# Patient Record
Sex: MALE | Race: BLACK OR AFRICAN AMERICAN | Employment: OTHER | URBAN - NONMETROPOLITAN AREA
[De-identification: names, ages, dates, MRNs, and addresses within clinical notes are randomized per-mention and may not be internally consistent; named-entity substitution may affect disease eponyms.]

---

## 2016-10-25 LAB
A/G RATIO: NORMAL
ALBUMIN SERPL-MCNC: NORMAL G/DL
ALP BLD-CCNC: NORMAL U/L
ALT SERPL-CCNC: 44 U/L
AST SERPL-CCNC: 28 U/L
BILIRUB SERPL-MCNC: 0.3 MG/DL (ref 0.1–1.4)
BILIRUBIN DIRECT: NORMAL MG/DL
BILIRUBIN, INDIRECT: NORMAL
GLOBULIN: NORMAL
PROSTATE SPECIFIC ANTIGEN: 0.56 NG/ML
PROTEIN TOTAL: NORMAL G/DL

## 2017-04-17 LAB
BASOPHILS ABSOLUTE: NORMAL /ΜL
BASOPHILS RELATIVE PERCENT: NORMAL %
EOSINOPHILS ABSOLUTE: NORMAL /ΜL
EOSINOPHILS RELATIVE PERCENT: NORMAL %
HCT VFR BLD CALC: 42.4 % (ref 41–53)
HEMOGLOBIN: 14.2 G/DL (ref 13.5–17.5)
LYMPHOCYTES ABSOLUTE: NORMAL /ΜL
LYMPHOCYTES RELATIVE PERCENT: NORMAL %
MCH RBC QN AUTO: NORMAL PG
MCHC RBC AUTO-ENTMCNC: NORMAL G/DL
MCV RBC AUTO: NORMAL FL
MONOCYTES ABSOLUTE: NORMAL /ΜL
MONOCYTES RELATIVE PERCENT: NORMAL %
NEUTROPHILS ABSOLUTE: NORMAL /ΜL
NEUTROPHILS RELATIVE PERCENT: NORMAL %
PDW BLD-RTO: NORMAL %
PLATELET # BLD: 244 K/ΜL
PMV BLD AUTO: NORMAL FL
RBC # BLD: NORMAL 10^6/ΜL
WBC # BLD: 6.65 10^3/ML

## 2017-04-27 ENCOUNTER — INITIAL CONSULT (OUTPATIENT)
Dept: PULMONOLOGY | Age: 57
End: 2017-04-27

## 2017-04-27 VITALS
HEART RATE: 86 BPM | OXYGEN SATURATION: 97 % | DIASTOLIC BLOOD PRESSURE: 70 MMHG | BODY MASS INDEX: 29 KG/M2 | SYSTOLIC BLOOD PRESSURE: 130 MMHG | WEIGHT: 218.8 LBS | HEIGHT: 73 IN

## 2017-04-27 DIAGNOSIS — R53.83 FATIGUE, UNSPECIFIED TYPE: ICD-10-CM

## 2017-04-27 DIAGNOSIS — G47.19 EXCESSIVE DAYTIME SLEEPINESS: Primary | ICD-10-CM

## 2017-04-27 DIAGNOSIS — R06.81 WITNESSED APNEIC SPELLS: ICD-10-CM

## 2017-04-27 DIAGNOSIS — R06.83 SNORING: ICD-10-CM

## 2017-04-27 DIAGNOSIS — G47.8 NON-RESTORATIVE SLEEP: ICD-10-CM

## 2017-04-27 PROCEDURE — 99204 OFFICE O/P NEW MOD 45 MIN: CPT | Performed by: INTERNAL MEDICINE

## 2017-05-25 ENCOUNTER — OFFICE VISIT (OUTPATIENT)
Dept: PULMONOLOGY | Age: 57
End: 2017-05-25

## 2017-05-25 VITALS
HEART RATE: 91 BPM | HEIGHT: 73 IN | OXYGEN SATURATION: 97 % | SYSTOLIC BLOOD PRESSURE: 120 MMHG | WEIGHT: 218.8 LBS | BODY MASS INDEX: 29 KG/M2 | DIASTOLIC BLOOD PRESSURE: 76 MMHG

## 2017-05-25 DIAGNOSIS — R06.83 SNORING: Primary | ICD-10-CM

## 2017-05-25 PROCEDURE — 99212 OFFICE O/P EST SF 10 MIN: CPT | Performed by: PHYSICIAN ASSISTANT

## 2017-07-21 ENCOUNTER — APPOINTMENT (OUTPATIENT)
Dept: GENERAL RADIOLOGY | Age: 57
End: 2017-07-21
Payer: OTHER GOVERNMENT

## 2017-07-21 ENCOUNTER — HOSPITAL ENCOUNTER (EMERGENCY)
Age: 57
Discharge: HOME | End: 2017-07-21
Attending: EMERGENCY MEDICINE
Payer: OTHER GOVERNMENT

## 2017-07-21 VITALS
HEART RATE: 91 BPM | DIASTOLIC BLOOD PRESSURE: 97 MMHG | BODY MASS INDEX: 27.59 KG/M2 | HEIGHT: 74 IN | RESPIRATION RATE: 15 BRPM | TEMPERATURE: 97.8 F | SYSTOLIC BLOOD PRESSURE: 133 MMHG | WEIGHT: 215 LBS | OXYGEN SATURATION: 99 %

## 2017-07-21 DIAGNOSIS — M25.552 PAIN OF LEFT HIP: Primary | ICD-10-CM

## 2017-07-21 DIAGNOSIS — S76.911A MUSCLE STRAIN OF THIGH, RIGHT, INITIAL ENCOUNTER: ICD-10-CM

## 2017-07-21 PROCEDURE — 99283 EMERGENCY DEPT VISIT LOW MDM: CPT

## 2017-07-21 PROCEDURE — 96372 THER/PROPH/DIAG INJ SC/IM: CPT

## 2017-07-21 PROCEDURE — 73552 X-RAY EXAM OF FEMUR 2/>: CPT

## 2017-07-21 PROCEDURE — 6360000002 HC RX W HCPCS: Performed by: EMERGENCY MEDICINE

## 2017-07-21 RX ORDER — TRAMADOL HYDROCHLORIDE 50 MG/1
50 TABLET ORAL EVERY 6 HOURS PRN
Qty: 9 TABLET | Refills: 0 | Status: SHIPPED | OUTPATIENT
Start: 2017-07-21 | End: 2017-10-04

## 2017-07-21 RX ADMIN — KETOROLAC TROMETHAMINE 60 MG: 30 INJECTION, SOLUTION INTRAMUSCULAR at 07:33

## 2017-07-21 ASSESSMENT — PAIN DESCRIPTION - LOCATION
LOCATION: HIP
LOCATION: HIP

## 2017-07-21 ASSESSMENT — PAIN SCALES - GENERAL
PAINLEVEL_OUTOF10: 9
PAINLEVEL_OUTOF10: 6

## 2017-07-21 ASSESSMENT — PAIN DESCRIPTION - FREQUENCY
FREQUENCY: CONTINUOUS
FREQUENCY: CONTINUOUS

## 2017-07-21 ASSESSMENT — ENCOUNTER SYMPTOMS
COLOR CHANGE: 0
PHOTOPHOBIA: 0
COUGH: 0
DIARRHEA: 0
STRIDOR: 0
ABDOMINAL DISTENTION: 0
EYE REDNESS: 0
SHORTNESS OF BREATH: 0
EYE DISCHARGE: 0
RHINORRHEA: 0
VOMITING: 0
FACIAL SWELLING: 0
NAUSEA: 0

## 2017-07-21 ASSESSMENT — PAIN DESCRIPTION - ORIENTATION
ORIENTATION: LEFT
ORIENTATION: LEFT

## 2017-07-21 ASSESSMENT — PAIN DESCRIPTION - DESCRIPTORS
DESCRIPTORS: STABBING
DESCRIPTORS: SHARP

## 2017-07-21 ASSESSMENT — PAIN DESCRIPTION - PROGRESSION: CLINICAL_PROGRESSION: GRADUALLY IMPROVING

## 2017-07-21 ASSESSMENT — PAIN DESCRIPTION - PAIN TYPE
TYPE: ACUTE PAIN
TYPE: ACUTE PAIN

## 2017-08-22 ENCOUNTER — HOSPITAL ENCOUNTER (EMERGENCY)
Age: 57
Discharge: HOME | End: 2017-08-22
Payer: OTHER GOVERNMENT

## 2017-08-22 VITALS
OXYGEN SATURATION: 98 % | RESPIRATION RATE: 16 BRPM | DIASTOLIC BLOOD PRESSURE: 97 MMHG | SYSTOLIC BLOOD PRESSURE: 146 MMHG | HEART RATE: 91 BPM | TEMPERATURE: 98.4 F

## 2017-08-22 DIAGNOSIS — S39.012A BACK STRAIN, INITIAL ENCOUNTER: Primary | ICD-10-CM

## 2017-08-22 PROCEDURE — 6360000002 HC RX W HCPCS: Performed by: PHYSICIAN ASSISTANT

## 2017-08-22 PROCEDURE — 99282 EMERGENCY DEPT VISIT SF MDM: CPT

## 2017-08-22 PROCEDURE — 96372 THER/PROPH/DIAG INJ SC/IM: CPT

## 2017-08-22 RX ORDER — NAPROXEN 500 MG/1
500 TABLET ORAL 2 TIMES DAILY
Qty: 60 TABLET | Refills: 0 | Status: SHIPPED | OUTPATIENT
Start: 2017-08-22 | End: 2017-10-24

## 2017-08-22 RX ADMIN — KETOROLAC TROMETHAMINE 60 MG: 30 INJECTION, SOLUTION INTRAMUSCULAR at 20:35

## 2017-08-22 ASSESSMENT — ENCOUNTER SYMPTOMS
CHEST TIGHTNESS: 0
BACK PAIN: 1
CONSTIPATION: 0
VOMITING: 0
SHORTNESS OF BREATH: 0
NAUSEA: 0
ABDOMINAL PAIN: 0
DIARRHEA: 0

## 2017-08-22 ASSESSMENT — PAIN SCALES - GENERAL
PAINLEVEL_OUTOF10: 10
PAINLEVEL_OUTOF10: 10

## 2017-08-22 ASSESSMENT — PAIN DESCRIPTION - LOCATION: LOCATION: BACK

## 2017-08-22 ASSESSMENT — PAIN DESCRIPTION - PAIN TYPE: TYPE: CHRONIC PAIN

## 2017-09-06 ENCOUNTER — HOSPITAL ENCOUNTER (EMERGENCY)
Age: 57
Discharge: HOME | End: 2017-09-06
Payer: OTHER GOVERNMENT

## 2017-09-06 VITALS
RESPIRATION RATE: 18 BRPM | BODY MASS INDEX: 26.95 KG/M2 | SYSTOLIC BLOOD PRESSURE: 144 MMHG | TEMPERATURE: 97.8 F | DIASTOLIC BLOOD PRESSURE: 87 MMHG | OXYGEN SATURATION: 97 % | WEIGHT: 210 LBS | HEIGHT: 74 IN | HEART RATE: 86 BPM

## 2017-09-06 DIAGNOSIS — S39.012A LUMBAR STRAIN, INITIAL ENCOUNTER: Primary | ICD-10-CM

## 2017-09-06 PROCEDURE — 6360000002 HC RX W HCPCS: Performed by: NURSE PRACTITIONER

## 2017-09-06 PROCEDURE — 96372 THER/PROPH/DIAG INJ SC/IM: CPT

## 2017-09-06 PROCEDURE — 99282 EMERGENCY DEPT VISIT SF MDM: CPT

## 2017-09-06 RX ORDER — METHYLPREDNISOLONE 4 MG/1
TABLET ORAL
Qty: 1 KIT | Refills: 0 | Status: SHIPPED | OUTPATIENT
Start: 2017-09-06 | End: 2017-09-12

## 2017-09-06 RX ORDER — ORPHENADRINE CITRATE 30 MG/ML
60 INJECTION INTRAMUSCULAR; INTRAVENOUS ONCE
Status: COMPLETED | OUTPATIENT
Start: 2017-09-06 | End: 2017-09-06

## 2017-09-06 RX ORDER — KETOROLAC TROMETHAMINE 30 MG/ML
30 INJECTION, SOLUTION INTRAMUSCULAR; INTRAVENOUS ONCE
Status: COMPLETED | OUTPATIENT
Start: 2017-09-06 | End: 2017-09-06

## 2017-09-06 RX ADMIN — ORPHENADRINE CITRATE 60 MG: 30 INJECTION INTRAMUSCULAR; INTRAVENOUS at 11:00

## 2017-09-06 RX ADMIN — KETOROLAC TROMETHAMINE 30 MG: 30 INJECTION, SOLUTION INTRAMUSCULAR at 10:59

## 2017-09-06 ASSESSMENT — ENCOUNTER SYMPTOMS
SORE THROAT: 0
SHORTNESS OF BREATH: 0
VOICE CHANGE: 0
ABDOMINAL DISTENTION: 0
CONSTIPATION: 0
COLOR CHANGE: 0
DIARRHEA: 0
EYE REDNESS: 0
VOMITING: 0
SINUS PRESSURE: 0
BLOOD IN STOOL: 0
BACK PAIN: 1
COUGH: 0
RHINORRHEA: 0
CHEST TIGHTNESS: 0
NAUSEA: 0
PHOTOPHOBIA: 0
ABDOMINAL PAIN: 0
WHEEZING: 0

## 2017-09-06 ASSESSMENT — PAIN DESCRIPTION - FREQUENCY: FREQUENCY: CONTINUOUS

## 2017-09-06 ASSESSMENT — PAIN DESCRIPTION - PAIN TYPE: TYPE: CHRONIC PAIN

## 2017-09-06 ASSESSMENT — PAIN SCALES - GENERAL
PAINLEVEL_OUTOF10: 10
PAINLEVEL_OUTOF10: 10

## 2017-09-06 ASSESSMENT — PAIN DESCRIPTION - ORIENTATION: ORIENTATION: LEFT;LOWER

## 2017-09-06 ASSESSMENT — PAIN DESCRIPTION - LOCATION: LOCATION: BACK

## 2017-10-04 ENCOUNTER — HOSPITAL ENCOUNTER (EMERGENCY)
Age: 57
Discharge: HOME | End: 2017-10-04
Attending: EMERGENCY MEDICINE
Payer: OTHER GOVERNMENT

## 2017-10-04 VITALS
HEIGHT: 74 IN | TEMPERATURE: 98.4 F | WEIGHT: 209 LBS | DIASTOLIC BLOOD PRESSURE: 106 MMHG | RESPIRATION RATE: 17 BRPM | SYSTOLIC BLOOD PRESSURE: 138 MMHG | BODY MASS INDEX: 26.82 KG/M2 | HEART RATE: 88 BPM | OXYGEN SATURATION: 99 %

## 2017-10-04 DIAGNOSIS — Z20.2 STD EXPOSURE: ICD-10-CM

## 2017-10-04 DIAGNOSIS — M54.42 LEFT-SIDED LOW BACK PAIN WITH LEFT-SIDED SCIATICA, UNSPECIFIED CHRONICITY: Primary | ICD-10-CM

## 2017-10-04 DIAGNOSIS — N34.2 URETHRITIS: ICD-10-CM

## 2017-10-04 DIAGNOSIS — B80 PINWORMS: ICD-10-CM

## 2017-10-04 LAB
BACTERIA: ABNORMAL /HPF
BILIRUBIN URINE: NEGATIVE
BLOOD, URINE: NEGATIVE
CASTS 2: ABNORMAL /LPF
CASTS UA: ABNORMAL /LPF
CHARACTER, URINE: CLEAR
CHLAMYDIA TRACHOMATIS BY RT-PCR: NOT DETECTED
COLOR: YELLOW
CRYSTALS, UA: ABNORMAL
CT/NG SOURCE: NORMAL
EPITHELIAL CELLS, UA: ABNORMAL /HPF
GLUCOSE URINE: NEGATIVE MG/DL
KETONES, URINE: ABNORMAL
LEUKOCYTE ESTERASE, URINE: NEGATIVE
MISCELLANEOUS 2: ABNORMAL
MUCUS: ABNORMAL
NEISSERIA GONORRHOEAE BY RT-PCR: NOT DETECTED
NITRITE, URINE: NEGATIVE
PH UA: 6
PROTEIN UA: 30
RBC URINE: ABNORMAL /HPF
RENAL EPITHELIAL, UA: ABNORMAL
SPECIFIC GRAVITY, URINE: >= 1.03 (ref 1–1.03)
UROBILINOGEN, URINE: 0.2 EU/DL
WBC UA: ABNORMAL /HPF
YEAST: ABNORMAL

## 2017-10-04 PROCEDURE — 81001 URINALYSIS AUTO W/SCOPE: CPT

## 2017-10-04 PROCEDURE — 99283 EMERGENCY DEPT VISIT LOW MDM: CPT

## 2017-10-04 PROCEDURE — 87591 N.GONORRHOEAE DNA AMP PROB: CPT

## 2017-10-04 PROCEDURE — 96372 THER/PROPH/DIAG INJ SC/IM: CPT

## 2017-10-04 PROCEDURE — 6370000000 HC RX 637 (ALT 250 FOR IP): Performed by: EMERGENCY MEDICINE

## 2017-10-04 PROCEDURE — 6360000002 HC RX W HCPCS: Performed by: EMERGENCY MEDICINE

## 2017-10-04 PROCEDURE — 2500000003 HC RX 250 WO HCPCS: Performed by: EMERGENCY MEDICINE

## 2017-10-04 PROCEDURE — 87491 CHLMYD TRACH DNA AMP PROBE: CPT

## 2017-10-04 RX ORDER — AZITHROMYCIN 250 MG/1
1000 TABLET, FILM COATED ORAL ONCE
Status: COMPLETED | OUTPATIENT
Start: 2017-10-04 | End: 2017-10-04

## 2017-10-04 RX ORDER — GABAPENTIN 300 MG/1
300 CAPSULE ORAL EVERY EVENING
Qty: 15 CAPSULE | Refills: 0 | Status: SHIPPED | OUTPATIENT
Start: 2017-10-04 | End: 2021-03-16 | Stop reason: SDUPTHER

## 2017-10-04 RX ORDER — METHOCARBAMOL 500 MG/1
500 TABLET, FILM COATED ORAL 3 TIMES DAILY
Qty: 15 TABLET | Refills: 0 | Status: SHIPPED | OUTPATIENT
Start: 2017-10-04 | End: 2019-08-14

## 2017-10-04 RX ORDER — TRAMADOL HYDROCHLORIDE 50 MG/1
50 TABLET ORAL EVERY 6 HOURS PRN
Qty: 10 TABLET | Refills: 0 | Status: SHIPPED | OUTPATIENT
Start: 2017-10-04 | End: 2018-10-14

## 2017-10-04 RX ORDER — ORPHENADRINE CITRATE 30 MG/ML
60 INJECTION INTRAMUSCULAR; INTRAVENOUS ONCE
Status: COMPLETED | OUTPATIENT
Start: 2017-10-04 | End: 2017-10-04

## 2017-10-04 RX ORDER — KETOROLAC TROMETHAMINE 30 MG/ML
30 INJECTION, SOLUTION INTRAMUSCULAR; INTRAVENOUS ONCE
Status: COMPLETED | OUTPATIENT
Start: 2017-10-04 | End: 2017-10-04

## 2017-10-04 RX ADMIN — KETOROLAC TROMETHAMINE 30 MG: 30 INJECTION, SOLUTION INTRAMUSCULAR at 18:44

## 2017-10-04 RX ADMIN — AZITHROMYCIN 1000 MG: 250 TABLET, FILM COATED ORAL at 18:44

## 2017-10-04 RX ADMIN — LIDOCAINE HYDROCHLORIDE 250 MG: 10 INJECTION, SOLUTION EPIDURAL; INFILTRATION; INTRACAUDAL; PERINEURAL at 18:44

## 2017-10-04 RX ADMIN — ORPHENADRINE CITRATE 60 MG: 30 INJECTION INTRAMUSCULAR; INTRAVENOUS at 18:44

## 2017-10-04 ASSESSMENT — PAIN SCALES - GENERAL
PAINLEVEL_OUTOF10: 10
PAINLEVEL_OUTOF10: 9
PAINLEVEL_OUTOF10: 8

## 2017-10-04 ASSESSMENT — PAIN DESCRIPTION - FREQUENCY
FREQUENCY: CONTINUOUS

## 2017-10-04 ASSESSMENT — PAIN DESCRIPTION - PAIN TYPE
TYPE: CHRONIC PAIN
TYPE: CHRONIC PAIN
TYPE: ACUTE PAIN

## 2017-10-04 ASSESSMENT — PAIN DESCRIPTION - LOCATION
LOCATION: BACK
LOCATION: BACK
LOCATION: LEG

## 2017-10-04 ASSESSMENT — ENCOUNTER SYMPTOMS
RHINORRHEA: 0
ABDOMINAL PAIN: 0
EYE DISCHARGE: 0
EYE REDNESS: 0
DIARRHEA: 0
BACK PAIN: 1
SHORTNESS OF BREATH: 0
SORE THROAT: 0
WHEEZING: 0
VOMITING: 0
NAUSEA: 0
COUGH: 0

## 2017-10-04 ASSESSMENT — PAIN DESCRIPTION - DESCRIPTORS
DESCRIPTORS: SHARP
DESCRIPTORS: SHARP

## 2017-10-04 ASSESSMENT — PAIN DESCRIPTION - ONSET
ONSET: ON-GOING
ONSET: ON-GOING

## 2017-10-04 ASSESSMENT — PAIN DESCRIPTION - ORIENTATION
ORIENTATION: LOWER
ORIENTATION: LOWER
ORIENTATION: LEFT

## 2017-10-04 NOTE — ED AVS SNAPSHOT
After Visit Summary  (Discharge Instructions)    Medication List for Home    Based on the information you provided to us as well as any changes during this visit, the following is your updated medication list.  Compare this with your prescription bottles at home. If you have any questions or concerns, contact your primary care physician's office. Daily Medication List (This medication list can be shared with any Healthcare provider who is helping you manage your medications)      There are NEW medications for you. START taking them after you leave the hospital     gabapentin 300 MG capsule   Commonly known as:  NEURONTIN   Take 1 capsule by mouth every evening       methocarbamol 500 MG tablet   Commonly known as:  ROBAXIN   Take 1 tablet by mouth 3 times daily       Pyrantel Pamoate 144 MG/ML Susp   Take 1,000 mg by mouth once for 1 dose Then in 2 weeks take 1000mg (7mL) po once. Then stop         These are medications you told us you were taking at home, CONTINUE taking them after you leave the hospital     traMADol 50 MG tablet   Commonly known as:  ULTRAM   Take 1 tablet by mouth every 6 hours as needed for Pain         ASK your doctor about these medications if you have questions     albuterol sulfate  (90 Base) MCG/ACT inhaler   2 puffs every 4-6 hours with spacer for wheezing and chest congestionwhen necessary       amitriptyline 25 MG tablet   Commonly known as:  ELAVIL   Take 25 mg by mouth nightly. benzonatate 100 MG capsule   Commonly known as:  TESSALON PERLES   Take 1 capsule by mouth 3 times daily as needed for Cough       CIALIS 20 MG tablet   Generic drug:  tadalafil   Take 20 mg by mouth as needed. cyanocobalamin 1000 MCG/ML injection   Inject 1,000 mcg into the muscle once. dicyclomine 10 MG capsule   Commonly known as:  BENTYL   Take 10 mg by mouth 4 times daily (before meals and nightly).        FLUoxetine 20 MG capsule   Commonly known as:  PROZAC Take 20 mg by mouth daily. GARLIC PO   Take by mouth       hydrochlorothiazide 25 MG tablet   Commonly known as:  HYDRODIURIL   Take 25 mg by mouth daily. ibuprofen 800 MG tablet   Commonly known as:  ADVIL;MOTRIN   1 tablet 3 times a day       metoprolol succinate 50 MG extended release tablet   Commonly known as:  TOPROL XL   Take 50 mg by mouth daily. naproxen 500 MG tablet   Commonly known as:  NAPROSYN   Take 1 tablet by mouth 2 times daily       pantoprazole 40 MG tablet   Commonly known as:  PROTONIX   Take 1 tablet by mouth daily       Phenylephrine-DM-GG 5- MG/5ML Liqd   Commonly known as:  ROBITUSSIN COUGH/COLD CF   2 teaspoon every 6 hours when necessary       sucralfate 1 GM tablet   Commonly known as:  CARAFATE   Take 1 tablet by mouth 4 times daily       TESTOSTERONE AQUEOUS IM   Inject 300 mg into the muscle. vardenafil 20 MG tablet   Commonly known as:  LEVITRA   Take 20 mg by mouth as needed. Quintin Morley Misc   Dx: right calcaneal and right fibula fracture Needs for assistance with adl's, (i.e. Dressing, toileting, grooming), and cane will not be adequate. NO weight bearing right leg. These are the medications you have told us you were taking at home, STOP taking them after you leave the hospital     cyclobenzaprine 10 MG tablet   Commonly known as:  FLEXERIL            Where to Get Your Medications      You can get these medications from any pharmacy     Bring a paper prescription for each of these medications     gabapentin 300 MG capsule    methocarbamol 500 MG tablet    Pyrantel Pamoate 144 MG/ML Susp    traMADol 50 MG tablet               Allergies as of 10/4/2017     No Known Allergies      Immunizations as of 10/4/2017     No immunizations on file. After Visit Summary    This summary was created for you. Thank you for entrusting your care to us.   The following information includes details about your hospital/visit stay along with steps you should take to help with your recovery once you leave the hospital.  In this packet, you will find information about the topics listed below:    · Instructions about your medications including a list of your home medications  · A summary of your hospital visit  · Follow-up appointments once you have left the hospital  · Your care plan at home      You may receive a survey regarding the care you received during your stay. Your input is valuable to us. We encourage you to complete and return your survey in the envelope provided. We hope you will choose us in the future for your healthcare needs. Patient Information     Patient Name CHRISTINE Gonzalez [facility] 1960      Care Provided at:     Name Address Phone       [address] [phone]            Your Visit    Here you will find information about your visit, including the reason for your visit. Please take this sheet with you when you visit your doctor or other health care provider in the future. It will help determine the best possible medical care for you at that time. If you have any questions once you leave the hospital, please call the department phone number listed below. Diagnoses this visit     Your diagnoses were LEFT-SIDED LOW BACK PAIN WITH LEFT-SIDED SCIATICA, UNSPECIFIED CHRONICITY, URETHRITIS, STD EXPOSURE, and PINWORMS. Visit Information     Date of Visit Department Dept Phone    10/4/2017 [facility] EMERGENCY DEPT [phone]      You were seen by     You were seen by Shell Vogel DO. Follow-up Appointments    Below is a list of your follow-up and future appointments. This may not be a complete list as you may have made appointments directly with providers that we are not aware of or your providers may have made some for you. Please call your providers to confirm appointments. It is important to keep your appointments. Please bring your current insurance card, photo ID, co-pay, and all medication bottles to your appointment. If self-pay, payment is expected at the time of service. Follow-up Information     Follow up with Nicol Gallego. Schedule an appointment as soon as possible for a visit in 3 days. Specialty:  Internal Medicine    Contact information:    [address]  [phone]        Preventive Care        Date Due    Hepatitis C screening is recommended for all adults regardless of risk factors born between [facility] at least once (lifetime) who have never been tested. 1960    HIV screening is recommended for all people regardless of risk factors  aged 15-65 years at least once (lifetime) who have never been HIV tested. 8/6/1975    Tetanus Combination Vaccine (1 - Tdap) 8/6/1979    Pneumococcal Vaccine - Pneumovax for adults aged 19-64 years with: chronic heart disease, chronic lung disease, diabetes mellitus, alcoholism, chronic liver disease, or cigarette smoking. (1 of 1 - PPSV23) 8/6/1979    Cholesterol Screening 8/6/2000    Diabetes Screening 8/6/2000    Colonoscopy 8/6/2010    Yearly Flu Vaccine (1) 9/1/2017            Ordered Labs Pending Results     Order Current Status    C. Trachomatis / N. Gonorrhoeae, DNA Preliminary result           Care Plan Once You Return Home    This section includes instructions you will need to follow once you leave the hospital.  Your care team will discuss these with you, so you and those caring for you know how to best care for your health needs at home. This section may also include educational information about certain health topics that may be of help to you. Important Information if you smoke or are exposed to smoking       SMOKING: QUIT SMOKING.   THIS IS THE MOST IMPORTANT ACTION YOU CAN TAKE TO If you have questions, please contact the physician practice where you receive care. Remember, MyChart is NOT to be used for urgent needs. For medical emergencies, dial 911. For questions regarding your MyChart account call [phone]. If you have a clinical question, please call your doctor's office. View your information online  ? Review your current list of  medications, immunization, and allergies. ? Review your future test results online . ? Review your discharge instructions provided by your caregivers at discharge    Certain functionality such as prescription refills, scheduling appointments or sending messages to your provider are not activated if your provider does not use [employer] in his/her office    For questions regarding your MyChart account call [phone]. If you have a clinical question, please call your doctor's office. The information on all pages of the After Visit Summary has been reviewed with me, the patient and/or responsible adult, by my health care provider(s). I had the opportunity to ask questions regarding this information. I understand I should dispose of my armband safely at home to protect my health information. A complete copy of the After Visit Summary has been given to me, the patient and/or responsible adult.          Patient Signature/Responsible Adult: ___________________________________    Nurse Signature: ___________________________________  Resident/MLP Signature: ___________________________________  Attending Signature: ___________________________________    Date:____________Time:____________              Discharge Instructions       Please follow-up with her primary care physician, or with the [address], please fill prescriptions as prescribed and take them as prescribed, if your symptoms worsen in any way, if you develop fevers, chills, abdominal pain, chest pain or shortness of breath, weakness in your lower extremities, loss of bowel or bladder function, please return to the emergency Department immediately. Pinworms: Care Instructions  Your Care Instructions  A pinworm is a type of parasite that lives in the lower digestive system of humans. Pinworms survive by getting nutrients from the food you eat. You are most likely to get pinworms by swallowing their eggs. This happens when someone with pinworms scratches around the anus, gets eggs on his or her hands (or under the fingernails), and touches you or an object that you later touch. Many people feel embarrassed about having \"worms. \" Pinworm infections can happen to anyone, are spread very easily, and are not related to being unclean. They are especially common in children. They are also easily treated. If you or someone in your family has pinworms that keep coming back, or if more than one family member is infected, every member of your family or household should be treated. Follow-up care is a key part of your treatment and safety. Be sure to make and go to all appointments, and call your doctor if you are having problems. Its also a good idea to know your test results and keep a list of the medicines you take. How can you care for yourself at home? · Take your medicine exactly as prescribed. Call your doctor if you have any problems with your medicine. · Wash your hands well and often. · Cut your fingernails short, and keep them trimmed. This can prevent eggs from sticking under your nails. · Wash all clothes, towels, and bedding. Do this often, and especially on the first day after treatment. Dry them in a heated dryer. · Do not scratch. Itching around the anus caused by a pinworm infection usually happens at night. Try wearing gloves, pajamas, and close-fitting clothing to help prevent scratching. · Bathe carefully every day. Be sure to clean the skin around the anus. This will remove pinworm eggs.  Showers may be better than baths because

## 2017-10-04 NOTE — ED PROVIDER NOTES
[facility]  eMERGENCY dEPARTMENT eNCOUnter          [address] [facility]       Chief Complaint   Patient presents with    Back Pain    Leg Pain     left    Exposure to STD    Other     worms in stool       Nurses Notes reviewed and I agree except as noted in the HPI. HISTORY OF PRESENT ILLNESS    Luz Lanier is a 62 y.o. male with a past medical history of hypertension and chronic back pain who presents to the ED for evaluation of back pain. The patient has a several month history of left sided back pain with radiation into his left leg. This pain has been present for several months, has been evaluated by his PCP and a provider at the [place]. His pain has been gradually worsening over the past several days without known injury or trauma. The patient's back pain radiates along the lateral aspect of his left leg with associated numbness. No weakness, urinary incontinence, or saddle paresthesia. The patient is still ambulatory, but states his pain is mildly exacerbated with ambulation. He was taking medications provided by the [place] for management of the back pain, which he is out of at this time. Additionally, the patient also complains of dysuria with penile discharge. He reports having unprotected sexual intercourse three days ago and is now experiencing these symptoms. The patient has had similar symptoms in the past with gonorrhea and chlamydia. Lastly, the patient states he has had string like worms in his stools for the past month. He was treated for similar symptoms over 11 years ago. The patient does not have any abdominal pain, fevers, chills, nausea, emesis, hematuria, or other symptoms. No additional complaints or concerns at the time of initial evaluation. REVIEW OF SYSTEMS     Review of Systems   Constitutional: Negative for appetite change, chills, fatigue and fever. HENT: Negative for congestion, ear pain, rhinorrhea and sore throat.     Eyes: Negative for discharge, redness and visual disturbance. Respiratory: Negative for cough, shortness of breath and wheezing. Cardiovascular: Negative for chest pain, palpitations and leg swelling. Gastrointestinal: Negative for abdominal pain, diarrhea, nausea and vomiting. Worms in stool   Genitourinary: Positive for discharge and dysuria. Negative for decreased urine volume and difficulty urinating. Positive for anal pruritis   Musculoskeletal: Positive for back pain. Negative for arthralgias, joint swelling and neck pain. Skin: Negative for pallor and rash. Allergic/Immunologic: Negative for environmental allergies. Neurological: Negative for dizziness, syncope, weakness, light-headedness and headaches. Hematological: Negative for adenopathy. Psychiatric/Behavioral: Negative for agitation, confusion, dysphoric mood and suicidal ideas. The patient is not nervous/anxious. PAST MEDICAL HISTORY    has a past medical history of Bipolar 1 disorder ([address] and Hypertension. SURGICAL HISTORY      has a past surgical history that includes Foot surgery (Right). CURRENT MEDICATIONS       Previous Medications    ALBUTEROL SULFATE  (90 BASE) MCG/ACT INHALER    2 puffs every 4-6 hours with spacer for wheezing and chest congestionwhen necessary    AMITRIPTYLINE (ELAVIL) 25 MG TABLET    Take 25 mg by mouth nightly. BENZONATATE (TESSALON PERLES) 100 MG CAPSULE    Take 1 capsule by mouth 3 times daily as needed for Cough    CYANOCOBALAMIN 1000 MCG/ML INJECTION    Inject 1,000 mcg into the muscle once. CYCLOBENZAPRINE (FLEXERIL) 10 MG TABLET    Take 10 mg by mouth 3 times daily as needed. DICYCLOMINE (BENTYL) 10 MG CAPSULE    Take 10 mg by mouth 4 times daily (before meals and nightly). FLUOXETINE (PROZAC) 20 MG CAPSULE    Take 20 mg by mouth daily. GARLIC PO    Take by mouth    HYDROCHLOROTHIAZIDE (HYDRODIURIL) 25 MG TABLET    Take 25 mg by mouth daily.     IBUPROFEN (ADVIL;MOTRIN) 800 MG TABLET    1 Cardiovascular: Normal rate, regular rhythm, normal heart sounds and intact distal pulses. Pulmonary/Chest: Effort normal. No respiratory distress. He has no wheezes. He has no rales. Abdominal: Soft. Bowel sounds are normal. There is no tenderness. There is no rebound and no guarding. Genitourinary: Testes normal and penis normal. Right testis shows no tenderness. Left testis shows no tenderness. No penile erythema. No discharge found. Genitourinary Comments: No worms noted around anus on external examination, no irritation or erythema   Musculoskeletal: Normal range of motion. He exhibits no deformity. Lumbar back: He exhibits tenderness (mild left paraspinal tenderness). He exhibits no bony tenderness. Neurological: He is alert and oriented to person, place, and time. He has normal strength and normal reflexes. He displays normal reflexes. No sensory deficit. MMS: +5/5 distally in all extremities, sensation equal distally bilaterally in all extremities. Skin: Skin is warm and dry. No rash noted. Psychiatric: He has a normal mood and affect. His behavior is normal.   Nursing note and vitals reviewed.       DIFFERENTIAL DIAGNOSIS:   STI vs UTI, Pinworms, Sciatics vs low back pain     DIAGNOSTIC RESULTS     EKG: All EKG's are interpreted by the Emergency Department Physician who either signs or Co-signs this chart in the absence of a cardiologist.  EKG interpreted by Allison Rwoley, DO:    None       LABS:   Labs Reviewed   URINE WITH REFLEXED MICRO - Abnormal; Notable for the following:        Result Value    Ketones, Urine TRACE (*)     Protein, UA 30 (*)     All other components within normal limits   C. TRACHOMATIS / N. GONORRHOEAE, DNA       EMERGENCY DEPARTMENT COURSE:   Vitals:    Vitals:    10/04/17 1637 10/04/17 1738 10/04/17 1842   BP: (!) 158/98 (!) 159/111 (!) 138/106   Pulse: 89 89 88   Resp: 16 17    Temp: 98.4 °F (36.9 °C)     SpO2: 98% 99% 99%   Weight: 209 lb (94.8 kg) Height:  6' 2\" (1.88 m)        5:50 PM: The patient was seen and evaluated. Labs will be completed. The patient will be treated with Azithromycin and Ceftriaxone for prophylactic treatment. Additionally, the patient will be given Norflex and Toradol while in the ED for management of his back pain. MDM:  Patient was treated for presumed STI, given dysuria, and possible exposure to an STI, he was treated for his back pain as well which did improve. Urinalysis was unremarkable, culture sent for [facility] and chlamydia. Patient's symptoms seem most consistent with pinworms, therefore we'll prescribe him pyrantel pamoate, he is also given prescriptions for tramadol, Robaxin, gabapentin and is to follow-up with the [facility], he states he hasn't appointment in 6 days. Patient understands this plan of care and will be discharged from the emergency department. CRITICAL CARE:   None      CONSULTS:  None     PROCEDURES:  None      FINAL IMPRESSION      1. Left-sided low back pain with left-sided sciatica, unspecified chronicity    2. Urethritis    3. STD exposure    4. Pinworms          DISPOSITION/PLAN   Discharge with follow-up    PATIENT REFERRED TO:  Alfredo Hull  [facility] [address]  [address] [address]  [phone]    Schedule an appointment as soon as possible for a visit in 3 days        DISCHARGE MEDICATIONS:  Discharge Medication List as of 10/4/2017  7:10 PM      START taking these medications    Details   methocarbamol (ROBAXIN) 500 MG tablet Take 1 tablet by mouth 3 times daily, Disp-15 tablet, R-0Print      Pyrantel Pamoate 144 MG/ML SUSP Take 1,000 mg by mouth once for 1 dose Then in 2 weeks take 1000mg (7mL) po once.  Then stop, Disp-14 mL, R-0Print      gabapentin (NEURONTIN) 300 MG capsule Take 1 capsule by mouth every evening, Disp-15 capsule, R-0Print             (Please note that portions of this note were completed with a voice recognition program.  Efforts were made to edit the dictations but occasionally words are mis-transcribed.)    Scribe: Fabricio Muhammad 10/4/17 5:50 PM Scribing for and in the presence of Ottoniel Morrow DO. Signed by: Sis Silveira, 10/04/17 6:09 PM    Provider:  I personally performed the services described in the documentation, reviewed and edited the documentation which was dictated to the scribe in my presence, and it accurately records my words and actions.     Ottoniel Morrow DO 10/4/17 6:09 PM       Ottoniel Morrow DO  10/04/17 8098

## 2017-10-04 NOTE — ED TRIAGE NOTES
Patient to ED with c/o lower back pain, left leg pain, std exposure, and worms in stool. Patient states the back and leg pain started about 2 weeks ago. Patient states he was exposed with STD 3 days ago. Patient states he has had worms in his stool for a couple months.

## 2017-10-04 NOTE — ED NOTES
Reassessment completed. VSS. Patient with continued complaint of pain. Medication given per order. Side rails up x1. Call light within reach. Plan of care updated with patient with verbalized understanding.       Mirian Arreola RN  10/04/17 7610

## 2017-10-17 LAB
BUN BLDV-MCNC: NORMAL MG/DL
CALCIUM SERPL-MCNC: NORMAL MG/DL
CHLORIDE BLD-SCNC: NORMAL MMOL/L
CHOLESTEROL, TOTAL: 189 MG/DL
CHOLESTEROL/HDL RATIO: NORMAL
CO2: 31 MMOL/L
CREAT SERPL-MCNC: 1.2 MG/DL
GFR CALCULATED: NORMAL
GLUCOSE BLD-MCNC: 103 MG/DL
HDLC SERPL-MCNC: 46 MG/DL (ref 35–70)
LDL CHOLESTEROL CALCULATED: 109 MG/DL (ref 0–160)
POTASSIUM SERPL-SCNC: 3.8 MMOL/L
PROSTATE SPECIFIC ANTIGEN: 1.47 NG/ML
SODIUM BLD-SCNC: 139 MMOL/L
TRIGL SERPL-MCNC: 170 MG/DL
VLDLC SERPL CALC-MCNC: NORMAL MG/DL

## 2017-10-24 ENCOUNTER — TELEPHONE (OUTPATIENT)
Dept: UROLOGY | Age: 57
End: 2017-10-24

## 2017-10-24 ENCOUNTER — OFFICE VISIT (OUTPATIENT)
Dept: UROLOGY | Age: 57
End: 2017-10-24
Payer: OTHER GOVERNMENT

## 2017-10-24 VITALS
WEIGHT: 210 LBS | BODY MASS INDEX: 26.95 KG/M2 | DIASTOLIC BLOOD PRESSURE: 80 MMHG | HEIGHT: 74 IN | SYSTOLIC BLOOD PRESSURE: 118 MMHG

## 2017-10-24 DIAGNOSIS — A63.0 VENEREAL WARTS: Primary | ICD-10-CM

## 2017-10-24 DIAGNOSIS — R39.14 BENIGN PROSTATIC HYPERPLASIA WITH INCOMPLETE BLADDER EMPTYING: ICD-10-CM

## 2017-10-24 DIAGNOSIS — N40.1 BENIGN PROSTATIC HYPERPLASIA WITH INCOMPLETE BLADDER EMPTYING: ICD-10-CM

## 2017-10-24 DIAGNOSIS — N52.9 ERECTILE DYSFUNCTION, UNSPECIFIED ERECTILE DYSFUNCTION TYPE: ICD-10-CM

## 2017-10-24 DIAGNOSIS — N50.819 TESTIS PAIN: ICD-10-CM

## 2017-10-24 LAB
BILIRUBIN URINE: NEGATIVE
BLOOD URINE, POC: NEGATIVE
CHARACTER, URINE: CLEAR
COLOR, URINE: YELLOW
GLUCOSE URINE: NEGATIVE MG/DL
KETONES, URINE: NEGATIVE
LEUKOCYTE CLUMPS, URINE: NEGATIVE
NITRITE, URINE: NEGATIVE
PH, URINE: 7
POST VOID RESIDUAL (PVR): 105 ML
PROTEIN, URINE: NEGATIVE MG/DL
SPECIFIC GRAVITY, URINE: 1.02 (ref 1–1.03)
UROBILINOGEN, URINE: 0.2 EU/DL

## 2017-10-24 PROCEDURE — 99203 OFFICE O/P NEW LOW 30 MIN: CPT | Performed by: UROLOGY

## 2017-10-24 PROCEDURE — 51798 US URINE CAPACITY MEASURE: CPT | Performed by: UROLOGY

## 2017-10-24 PROCEDURE — 52000 CYSTOURETHROSCOPY: CPT | Performed by: UROLOGY

## 2017-10-24 PROCEDURE — 81003 URINALYSIS AUTO W/O SCOPE: CPT | Performed by: UROLOGY

## 2017-10-24 RX ORDER — DOXYCYCLINE HYCLATE 100 MG
100 TABLET ORAL 2 TIMES DAILY
Qty: 20 TABLET | Refills: 0 | Status: SHIPPED | OUTPATIENT
Start: 2017-10-24 | End: 2017-11-03

## 2017-10-24 RX ORDER — TAMSULOSIN HYDROCHLORIDE 0.4 MG/1
0.4 CAPSULE ORAL NIGHTLY
Qty: 90 CAPSULE | Refills: 3 | Status: SHIPPED | OUTPATIENT
Start: 2017-10-24 | End: 2019-08-14

## 2017-10-24 ASSESSMENT — ENCOUNTER SYMPTOMS
COLOR CHANGE: 0
ABDOMINAL PAIN: 0
CHEST TIGHTNESS: 0
EYE DISCHARGE: 0
BACK PAIN: 0
SHORTNESS OF BREATH: 0
EYE PAIN: 0
NAUSEA: 0

## 2017-10-24 NOTE — PATIENT INSTRUCTIONS
Patient Education        Stopping Smoking: Care Instructions  Your Care Instructions  Cigarette smokers crave the nicotine in cigarettes. Giving it up is much harder than simply changing a habit. Your body has to stop craving the nicotine. It is hard to quit, but you can do it. There are many tools that people use to quit smoking. You may find that combining tools works best for you. There are several steps to quitting. First you get ready to quit. Then you get support to help you. After that, you learn new skills and behaviors to become a nonsmoker. For many people, a necessary step is getting and using medicine. Your doctor will help you set up the plan that best meets your needs. You may want to attend a smoking cessation program to help you quit smoking. When you choose a program, look for one that has proven success. Ask your doctor for ideas. You will greatly increase your chances of success if you take medicine as well as get counseling or join a cessation program.  Some of the changes you feel when you first quit tobacco are uncomfortable. Your body will miss the nicotine at first, and you may feel short-tempered and grumpy. You may have trouble sleeping or concentrating. Medicine can help you deal with these symptoms. You may struggle with changing your smoking habits and rituals. The last step is the tricky one: Be prepared for the smoking urge to continue for a time. This is a lot to deal with, but keep at it. You will feel better. Follow-up care is a key part of your treatment and safety. Be sure to make and go to all appointments, and call your doctor if you are having problems. Its also a good idea to know your test results and keep a list of the medicines you take. How can you care for yourself at home? · Ask your family, friends, and coworkers for support. You have a better chance of quitting if you have help and support.   · Join a support group, such as Nicotine Anonymous, for people who are trying to quit smoking. · Consider signing up for a smoking cessation program, such as the American Lung Association's Freedom from Smoking program.  · Set a quit date. Pick your date carefully so that it is not right in the middle of a big deadline or stressful time. Once you quit, do not even take a puff. Get rid of all ashtrays and lighters after your last cigarette. Clean your house and your clothes so that they do not smell of smoke. · Learn how to be a nonsmoker. Think about ways you can avoid those things that make you reach for a cigarette. ¨ Avoid situations that put you at greatest risk for smoking. For some people, it is hard to have a drink with friends without smoking. For others, they might skip a coffee break with coworkers who smoke. ¨ Change your daily routine. Take a different route to work or eat a meal in a different place. · Cut down on stress. Calm yourself or release tension by doing an activity you enjoy, such as reading a book, taking a hot bath, or gardening. · Talk to your doctor or pharmacist about nicotine replacement therapy, which replaces the nicotine in your body. You still get nicotine but you do not use tobacco. Nicotine replacement products help you slowly reduce the amount of nicotine you need. These products come in several forms, many of them available over-the-counter:  ¨ Nicotine patches  ¨ Nicotine gum and lozenges  ¨ Nicotine inhaler  · Ask your doctor about bupropion (Wellbutrin) or varenicline (Chantix), which are prescription medicines. They do not contain nicotine. They help you by reducing withdrawal symptoms, such as stress and anxiety. · Some people find hypnosis, acupuncture, and massage helpful for ending the smoking habit. · Eat a healthy diet and get regular exercise. Having healthy habits will help your body move past its craving for nicotine. · Be prepared to keep trying. Most people are not successful the first few times they try to quit.  Do not get mad at yourself if you smoke again. Make a list of things you learned and think about when you want to try again, such as next week, next month, or next year. Where can you learn more? Go to https://Nerve.commonica.Autoparts24. org and sign in to your [employer] account. Enter C041 in the [facility] box to learn more about \"Stopping Smoking: Care Instructions. \"     If you do not have an account, please click on the \"Sign Up Now\" link. Current as of: March 20, 2017  Content Version: 11.3  © 0841-7021 [employer] Incorporated. Care instructions adapted under license by [facility] ([facility]). If you have questions about a medical condition or this instruction, always ask your healthcare professional. [address] any warranty or liability for your use of this information.

## 2017-10-24 NOTE — TELEPHONE ENCOUNTER
Michael Trevino was scheduled for ultrasound of the scrotum & testicles at [facility]'s 11/9/17 0900. He was advised on arrival 0830. Patient voiced understanding. Body Location Override (Optional - Billing Will Still Be Based On Selected Body Map Location If Applicable): Right back

## 2017-10-24 NOTE — PROGRESS NOTES
including urethral warts, Incomplete bladder emptying, ED and left testicular pain. He says he had penile and urethral warts removed in 2007 and he feels he currently has urethral warts. He was given cialis, EDEX, caverject and viagra in the past without much benefit. He denies hematuria. He is an active smoker. Patient Active Problem List   Diagnosis    Closed right calcaneal fracture       Current Outpatient Prescriptions   Medication Sig Dispense Refill    doxycycline hyclate (VIBRA-TABS) 100 MG tablet Take 1 tablet by mouth 2 times daily for 10 days 20 tablet 0    tamsulosin (FLOMAX) 0.4 MG capsule Take 1 capsule by mouth nightly 90 capsule 3    GARLIC PO Take by mouth      gabapentin (NEURONTIN) 300 MG capsule Take 1 capsule by mouth every evening 15 capsule 0    pantoprazole (PROTONIX) 40 MG tablet Take 1 tablet by mouth daily 30 tablet 0    amitriptyline (ELAVIL) 25 MG tablet Take 25 mg by mouth nightly.  FLUoxetine (PROZAC) 20 MG capsule Take 20 mg by mouth daily.  metoprolol (TOPROL-XL) 50 MG XL tablet Take 50 mg by mouth daily.  TESTOSTERONE AQUEOUS IM Inject 300 mg into the muscle.  cyanocobalamin 1000 MCG/ML injection Inject 1,000 mcg into the muscle once.  Misc. Devices ([facility]) MISC Dx: right calcaneal and right fibula fracture  Needs for assistance with adl's, (i.e. Dressing, toileting, grooming), and cane will not be adequate. NO weight bearing right leg.  1 each 0    methocarbamol (ROBAXIN) 500 MG tablet Take 1 tablet by mouth 3 times daily 15 tablet 0    traMADol (ULTRAM) 50 MG tablet Take 1 tablet by mouth every 6 hours as needed for Pain 10 tablet 0    benzonatate (TESSALON PERLES) 100 MG capsule Take 1 capsule by mouth 3 times daily as needed for Cough 20 capsule 0    albuterol sulfate  (90 BASE) MCG/ACT inhaler 2 puffs every 4-6 hours with spacer for wheezing and chest congestionwhen necessary 1 Inhaler 0    ibuprofen (ADVIL;MOTRIN) jelly.  Procedure:  #16 Kazakh flex. cystoscope inserted. The examination of anterior urethra shows no  urethral stricture or warts. Prostatic urethra &  bladder reveals couldn't be examined as the patient couldn't tolerate cystoscope. .  Inraop. photos taken none. The scope was then withdrawn. Ciprofloxacin 500 mg given. Assessment:     1. Venereal warts  POCT Urinalysis No Micro (Auto)    DC CYSTOURETHROSCOPY   2. Benign prostatic hyperplasia with incomplete bladder emptying  poct post void residual   3. Erectile dysfunction, unspecified erectile dysfunction type     4. Testis pain  US SCROTUM AND TESTICLES       Plan:   Veneral Warts- to small for intervention  BPH- Flomax initiated  ED- wishes to proceed with TRIMIX teaching  Testicular pain possible left epididymitis- was initiated on Doxycycline. Will need Ultrasound of scrotum. I spent 30 minutes with patient and family today. >50% time spent counseling and/or coordination care as outlined above. Orders placed:  Orders Placed This Encounter   Procedures    US SCROTUM AND TESTICLES     Standing Status:   Future     Standing Expiration Date:   10/24/2018     Order Specific Question:   Reason for exam:     Answer:   left testicular pain    POCT Urinalysis No Micro (Auto)    poct post void residual     Bladder scan    DC CYSTOURETHROSCOPY       Prescriptions ordered:  Orders Placed This Encounter   Medications    doxycycline hyclate (VIBRA-TABS) 100 MG tablet     Sig: Take 1 tablet by mouth 2 times daily for 10 days     Dispense:  20 tablet     Refill:  0    tamsulosin (FLOMAX) 0.4 MG capsule     Sig: Take 1 capsule by mouth nightly     Dispense:  90 capsule     Refill:  3       Return in about 4 weeks (around 11/21/2017), or if symptoms worsen or fail to improve, for ED, testicular pain, BPH.      Destiny Cullen MD

## 2017-11-09 ENCOUNTER — APPOINTMENT (OUTPATIENT)
Dept: GENERAL RADIOLOGY | Age: 57
End: 2017-11-09
Payer: OTHER GOVERNMENT

## 2017-11-09 ENCOUNTER — HOSPITAL ENCOUNTER (EMERGENCY)
Age: 57
Discharge: HOME | End: 2017-11-09
Payer: OTHER GOVERNMENT

## 2017-11-09 ENCOUNTER — HOSPITAL ENCOUNTER (OUTPATIENT)
Dept: ULTRASOUND IMAGING | Age: 57
Discharge: HOME | End: 2017-11-09
Payer: OTHER GOVERNMENT

## 2017-11-09 VITALS
RESPIRATION RATE: 18 BRPM | BODY MASS INDEX: 27.7 KG/M2 | WEIGHT: 209 LBS | DIASTOLIC BLOOD PRESSURE: 98 MMHG | TEMPERATURE: 97.7 F | SYSTOLIC BLOOD PRESSURE: 138 MMHG | HEART RATE: 68 BPM | HEIGHT: 73 IN | OXYGEN SATURATION: 97 %

## 2017-11-09 DIAGNOSIS — G89.29 ACUTE EXACERBATION OF CHRONIC LOW BACK PAIN: ICD-10-CM

## 2017-11-09 DIAGNOSIS — N50.819 TESTIS PAIN: ICD-10-CM

## 2017-11-09 DIAGNOSIS — J40 BRONCHITIS: Primary | ICD-10-CM

## 2017-11-09 DIAGNOSIS — M54.50 ACUTE EXACERBATION OF CHRONIC LOW BACK PAIN: ICD-10-CM

## 2017-11-09 LAB
ANION GAP SERPL CALCULATED.3IONS-SCNC: 13 MEQ/L (ref 8–16)
BASOPHILS # BLD: 0.5 %
BASOPHILS ABSOLUTE: 0 THOU/MM3 (ref 0–0.1)
BUN BLDV-MCNC: 7 MG/DL (ref 7–22)
CALCIUM SERPL-MCNC: 8.8 MG/DL (ref 8.5–10.5)
CHLORIDE BLD-SCNC: 101 MEQ/L (ref 98–111)
CO2: 26 MEQ/L (ref 23–33)
CREAT SERPL-MCNC: 0.9 MG/DL (ref 0.4–1.2)
EOSINOPHIL # BLD: 9.9 %
EOSINOPHILS ABSOLUTE: 0.8 THOU/MM3 (ref 0–0.4)
GFR SERPL CREATININE-BSD FRML MDRD: > 90 ML/MIN/1.73M2
GLUCOSE BLD-MCNC: 105 MG/DL (ref 70–108)
HCT VFR BLD CALC: 42.1 % (ref 42–52)
HEMOGLOBIN: 14.5 GM/DL (ref 14–18)
LYMPHOCYTES # BLD: 25.5 %
LYMPHOCYTES ABSOLUTE: 2.1 THOU/MM3 (ref 1–4.8)
MCH RBC QN AUTO: 30.4 PG (ref 27–31)
MCHC RBC AUTO-ENTMCNC: 34.5 GM/DL (ref 33–37)
MCV RBC AUTO: 88.1 FL (ref 80–94)
MONOCYTES # BLD: 9 %
MONOCYTES ABSOLUTE: 0.7 THOU/MM3 (ref 0.4–1.3)
NUCLEATED RED BLOOD CELLS: 0 /100 WBC
OSMOLALITY CALCULATION: 277.7 MOSMOL/KG (ref 275–300)
PDW BLD-RTO: 14.2 % (ref 11.5–14.5)
PLATELET # BLD: 221 THOU/MM3 (ref 130–400)
PMV BLD AUTO: 8.6 MCM (ref 7.4–10.4)
POTASSIUM SERPL-SCNC: 4.3 MEQ/L (ref 3.5–5.2)
RBC # BLD: 4.77 MILL/MM3 (ref 4.7–6.1)
SEG NEUTROPHILS: 55.1 %
SEGMENTED NEUTROPHILS ABSOLUTE COUNT: 4.5 THOU/MM3 (ref 1.8–7.7)
SODIUM BLD-SCNC: 140 MEQ/L (ref 135–145)
WBC # BLD: 8.2 THOU/MM3 (ref 4.8–10.8)

## 2017-11-09 PROCEDURE — 80048 BASIC METABOLIC PNL TOTAL CA: CPT

## 2017-11-09 PROCEDURE — 96372 THER/PROPH/DIAG INJ SC/IM: CPT

## 2017-11-09 PROCEDURE — 36415 COLL VENOUS BLD VENIPUNCTURE: CPT

## 2017-11-09 PROCEDURE — 6370000000 HC RX 637 (ALT 250 FOR IP): Performed by: EMERGENCY MEDICINE

## 2017-11-09 PROCEDURE — 6360000002 HC RX W HCPCS: Performed by: EMERGENCY MEDICINE

## 2017-11-09 PROCEDURE — 94640 AIRWAY INHALATION TREATMENT: CPT

## 2017-11-09 PROCEDURE — 71020 XR CHEST STANDARD TWO VW: CPT

## 2017-11-09 PROCEDURE — 99284 EMERGENCY DEPT VISIT MOD MDM: CPT

## 2017-11-09 PROCEDURE — 76870 US EXAM SCROTUM: CPT

## 2017-11-09 PROCEDURE — 85025 COMPLETE CBC W/AUTO DIFF WBC: CPT

## 2017-11-09 RX ORDER — ETODOLAC 400 MG/1
400 TABLET, EXTENDED RELEASE ORAL DAILY
Qty: 10 TABLET | Refills: 0 | Status: SHIPPED | OUTPATIENT
Start: 2017-11-09 | End: 2019-01-07

## 2017-11-09 RX ORDER — BENZONATATE 100 MG/1
100 CAPSULE ORAL 3 TIMES DAILY PRN
Qty: 20 CAPSULE | Refills: 0 | Status: SHIPPED | OUTPATIENT
Start: 2017-11-09 | End: 2019-01-07

## 2017-11-09 RX ORDER — PREDNISONE 20 MG/1
60 TABLET ORAL ONCE
Status: COMPLETED | OUTPATIENT
Start: 2017-11-09 | End: 2017-11-09

## 2017-11-09 RX ORDER — PREDNISONE 20 MG/1
20 TABLET ORAL DAILY
Status: DISCONTINUED | OUTPATIENT
Start: 2017-11-10 | End: 2017-11-09

## 2017-11-09 RX ORDER — KETOROLAC TROMETHAMINE 30 MG/ML
30 INJECTION, SOLUTION INTRAMUSCULAR; INTRAVENOUS ONCE
Status: COMPLETED | OUTPATIENT
Start: 2017-11-09 | End: 2017-11-09

## 2017-11-09 RX ORDER — PREDNISONE 20 MG/1
20 TABLET ORAL DAILY
Qty: 5 TABLET | Refills: 0 | Status: SHIPPED | OUTPATIENT
Start: 2017-11-09 | End: 2017-11-14

## 2017-11-09 RX ADMIN — PREDNISONE 60 MG: 20 TABLET ORAL at 12:01

## 2017-11-09 RX ADMIN — ALBUTEROL SULFATE 2.5 MG: 2.5 SOLUTION RESPIRATORY (INHALATION) at 11:03

## 2017-11-09 RX ADMIN — KETOROLAC TROMETHAMINE 30 MG: 30 INJECTION, SOLUTION INTRAMUSCULAR at 11:20

## 2017-11-09 ASSESSMENT — ENCOUNTER SYMPTOMS
DIARRHEA: 0
CHEST TIGHTNESS: 0
NAUSEA: 0
BACK PAIN: 1
ABDOMINAL PAIN: 0
VOMITING: 0
STRIDOR: 0
SINUS PRESSURE: 1
SORE THROAT: 0
WHEEZING: 1
SINUS PAIN: 0
ABDOMINAL DISTENTION: 0
COLOR CHANGE: 0
TROUBLE SWALLOWING: 0

## 2017-11-09 ASSESSMENT — PAIN DESCRIPTION - PAIN TYPE
TYPE: ACUTE PAIN
TYPE: ACUTE PAIN

## 2017-11-09 ASSESSMENT — PAIN DESCRIPTION - ORIENTATION
ORIENTATION: LEFT
ORIENTATION: LEFT

## 2017-11-09 ASSESSMENT — PAIN DESCRIPTION - LOCATION
LOCATION: BACK;LEG
LOCATION: BACK;LEG

## 2017-11-09 ASSESSMENT — PAIN SCALES - GENERAL
PAINLEVEL_OUTOF10: 4
PAINLEVEL_OUTOF10: 8

## 2017-11-09 ASSESSMENT — PAIN DESCRIPTION - DESCRIPTORS: DESCRIPTORS: ACHING

## 2017-11-09 NOTE — ED PROVIDER NOTES
deformity and no spasm. Neurological: He is alert and oriented to person, place, and time. He has normal strength. Reflex Scores:       Patellar reflexes are 2+ on the right side and 2+ on the left side. No foot drop or decreased sensation. Skin: Skin is warm. He is diaphoretic. DIFFERENTIAL DIAGNOSIS:   Bronchitis, COPD, pneumonia, chronic pain  DIAGNOSTIC RESULTS       RADIOLOGY: non-plain film images(s) such as CT, Ultrasound and MRI are read by the radiologist.    PROCEDURE: XR CHEST STANDARD TWO VW       CLINICAL INFORMATION: Cough       TECHNIQUE: PA and lateral chest radiographs.       COMPARISON: PA and lateral chest radiographs 10/19/2016       FINDINGS: Cardiomediastinal silhouette is within normal limits. Lungs are mildly hyperinflated. There are no lung consolidations. Degenerative changes are present in the thoracic spine. Soft tissues are unremarkable.           Impression   No acute intrathoracic process.                   **This report has been created using voice recognition software. It may contain minor errors which are inherent in voice recognition technology. **       Final report electronically signed by Dr. Dhaval Nelson on 11/9/2017 10:59 AM           LABS:     Labs Reviewed   CBC WITH AUTO DIFFERENTIAL - Abnormal; Notable for the following:        Result Value    Eosinophils # 0.8 (*)     All other components within normal limits   BASIC METABOLIC PANEL   ANION GAP   GLOMERULAR FILTRATION RATE, ESTIMATED   OSMOLALITY       EMERGENCY DEPARTMENT COURSE:   Vitals:    Vitals:    11/09/17 1029 11/09/17 1042 11/09/17 1203   BP: (!) 142/97 (!) 140/96 (!) 138/98   Pulse: 101 97 68   Resp: 22 20 18   Temp: 97.7 °F (36.5 °C)     TempSrc: Oral     SpO2: 98% 97% 97%   Weight: 209 lb (94.8 kg)     Height: 6' 1\" (1.854 m)         11:12 AM: The patient was seen and evaluated. MDM:  The patient was given an albuterol aerosol treatment.   He continued some expiratory wheezes after the [address]  11/09/17 2015

## 2017-11-17 ENCOUNTER — OFFICE VISIT (OUTPATIENT)
Dept: UROLOGY | Age: 57
End: 2017-11-17
Payer: OTHER GOVERNMENT

## 2017-11-17 ENCOUNTER — TELEPHONE (OUTPATIENT)
Dept: UROLOGY | Age: 57
End: 2017-11-17

## 2017-11-17 VITALS
SYSTOLIC BLOOD PRESSURE: 142 MMHG | HEIGHT: 74 IN | DIASTOLIC BLOOD PRESSURE: 84 MMHG | BODY MASS INDEX: 26.82 KG/M2 | WEIGHT: 209 LBS

## 2017-11-17 DIAGNOSIS — N50.819 ORCHALGIA: ICD-10-CM

## 2017-11-17 DIAGNOSIS — N37: ICD-10-CM

## 2017-11-17 DIAGNOSIS — N52.9 ERECTILE DYSFUNCTION, UNSPECIFIED ERECTILE DYSFUNCTION TYPE: ICD-10-CM

## 2017-11-17 DIAGNOSIS — N13.8 BPH WITH OBSTRUCTION/LOWER URINARY TRACT SYMPTOMS: Primary | ICD-10-CM

## 2017-11-17 DIAGNOSIS — A63.0: ICD-10-CM

## 2017-11-17 DIAGNOSIS — N40.1 BPH WITH OBSTRUCTION/LOWER URINARY TRACT SYMPTOMS: Primary | ICD-10-CM

## 2017-11-17 LAB — POST VOID RESIDUAL (PVR): 16 ML

## 2017-11-17 PROCEDURE — 99213 OFFICE O/P EST LOW 20 MIN: CPT | Performed by: UROLOGY

## 2017-11-17 PROCEDURE — 81003 URINALYSIS AUTO W/O SCOPE: CPT | Performed by: UROLOGY

## 2017-11-17 PROCEDURE — 51798 US URINE CAPACITY MEASURE: CPT | Performed by: UROLOGY

## 2017-11-17 RX ORDER — DOXYCYCLINE HYCLATE 100 MG/1
100 CAPSULE ORAL 2 TIMES DAILY
COMMUNITY
End: 2019-01-07

## 2017-11-17 NOTE — PROGRESS NOTES
Visit date: 11/17/2017    Yandy Huitron is a 62 y.o. male    Chief Complaint   Patient presents with    Follow-up    Benign Prostatic Hypertrophy    Erectile Dysfunction       Reason for Follow-up: ED, BPH, Orchalgia    HPI: Mr. Jeremiah Hodges is being seen for follow up of  ED, BPH, Orchalgia. He is here to review his scrotal Ultrasound. He is due for trimix teaching in the near future. He is on Flomax for his BPH. He was given doxy at last visit. He say he persists to have pain in the left testis. Review of Systems  No problems with ears, nose or throat. No problems with eyes. No chest pain, shortness of breath, abdominal pain, extremity pain or weakness, and no neurological deficits. No rashes. No swollen glands or lymph nodes.  symptoms per HPI. The remainder of the review of symptoms is negative. Exam  General: alert and oriented. Cooperative. HENT: Normocephalic, Atraumatic. Eyes: No scleral icterus, mucous membranes moist.  Respiratory: Effort normal.   Skin: No rashes or obvious lesions. BP (!) 142/84   Ht 6' 2\" (1.88 m)   Wt 209 lb (94.8 kg)   BMI 26.83 kg/m²     Labs  Lab Results   Component Value Date    CREATININE 0.9 11/09/2017     Lab Results   Component Value Date    WBC 8.2 11/09/2017    HGB 14.5 11/09/2017    HCT 42.1 11/09/2017    MCV 88.1 11/09/2017     11/09/2017     Results for POC orders placed in visit on 11/17/17   poct post void residual   Result Value Ref Range    post void residual 16 ml     Lab Results   Component Value Date    PSA 1.47 10/17/2017    PSA 0.56 10/25/2016     Scrotal Ultrasound  11/9/2017  FINDINGS:        B-mode study: The right testicle measures 4.4 x 2.9 x 1.8 cm and the left testicle measures 4.8 x 2.2 x 1.3 cm. Both testicles are homogeneous in echotexture without evidence of intratesticular mass.  The bilateral epididymis are normal. There is a small    right hydrocele.       Doppler study: There is normal Doppler flow in both testicles and in the extratesticular structures. Flow is symmetric bilaterally. There is no Doppler evidence of torsion.           Impression   1. Normal bilateral testicular ultrasound. 2. Small right-sided hydrocele. 3. Normal Doppler study. Imp:  1. BPH with obstruction/lower urinary tract symptoms    2. Erectile dysfunction, unspecified erectile dysfunction type    3. Urethral venereal warts    4. Orchalgia        Plan:   Follow with PSA in one yeaR  Will retrun for trimix teaching when he is ready  Continue flomax for BPH  Orchalgia scrotal support, OTC analgesics PRn pain, benign nature of the scrotal Ultrasound explain      Return in about 1 year (around 11/17/2018), or if symptoms worsen or fail to improve, for ED. Medication Ordered:  No orders of the defined types were placed in this encounter.     Orders Placed:  Orders Placed This Encounter   Procedures    PSA Prostatic Specific Antigen     Standing Status:   Future     Standing Expiration Date:   11/17/2018    POCT Urinalysis No Micro (Auto)    poct post void residual     Bladder scan       Electronically signed by Parth Isaacs MD on 11/17/2017 at 11:10 AM

## 2017-11-17 NOTE — PATIENT INSTRUCTIONS
mad at yourself if you smoke again. Make a list of things you learned and think about when you want to try again, such as next week, next month, or next year. Where can you learn more? Go to https://[email or web address].[email or web address] Ghost [email or web address] org and sign in to your [email or web address] account. Enter F153 in the Py[employer]os box to learn more about \"Stopping Smoking: Care Instructions. \"     If you do not have an account, please click on the \"Sign Up Now\" link. Current as of: March 20, 2017  Content Version: 11.3  © 2695-4255 [employer], Incorporated. Care instructions adapted under license by [facility] ([facility]). If you have questions about a medical condition or this instruction, always ask your healthcare professional. [address] any warranty or liability for your use of this information.

## 2017-12-26 ENCOUNTER — OFFICE VISIT (OUTPATIENT)
Dept: UROLOGY | Age: 57
End: 2017-12-26
Payer: OTHER GOVERNMENT

## 2017-12-26 VITALS
SYSTOLIC BLOOD PRESSURE: 138 MMHG | WEIGHT: 204.5 LBS | HEIGHT: 74 IN | DIASTOLIC BLOOD PRESSURE: 84 MMHG | BODY MASS INDEX: 26.24 KG/M2

## 2017-12-26 DIAGNOSIS — N52.9 ERECTILE DYSFUNCTION, UNSPECIFIED ERECTILE DYSFUNCTION TYPE: Primary | ICD-10-CM

## 2017-12-26 PROCEDURE — 99214 OFFICE O/P EST MOD 30 MIN: CPT | Performed by: UROLOGY

## 2017-12-26 NOTE — PATIENT INSTRUCTIONS
get mad at yourself if you smoke again. Make a list of things you learned and think about when you want to try again, such as next week, next month, or next year. Where can you learn more? Go to https://ShopSueymonica.Spacebikini. org and sign in to your Architurn account. Enter B291 in the [employer]roPower box to learn more about \"Stopping Smoking: Care Instructions. \"     If you do not have an account, please click on the \"Sign Up Now\" link. Current as of: March 20, 2017  Content Version: 11.4  © 1477-9358 Healthwise, Incorporated. Care instructions adapted under license by [facility] ([facility]). If you have questions about a medical condition or this instruction, always ask your healthcare professional. [address] any warranty or liability for your use of this information.

## 2018-01-05 ENCOUNTER — TELEPHONE (OUTPATIENT)
Dept: UROLOGY | Age: 58
End: 2018-01-05

## 2018-01-26 LAB
BILIRUBIN URINE: NORMAL MG/DL
BLOOD, URINE: NEGATIVE
CHOLESTEROL, TOTAL: 187 MG/DL
CHOLESTEROL/HDL RATIO: NORMAL
CLARITY: CLEAR
COLOR: YELLOW
GLUCOSE URINE: NEGATIVE
HDLC SERPL-MCNC: 49 MG/DL (ref 35–70)
KETONES, URINE: NEGATIVE
LDL CHOLESTEROL CALCULATED: 110 MG/DL (ref 0–160)
LEUKOCYTE ESTERASE, URINE: NEGATIVE
NITRITE, URINE: NEGATIVE
PH UA: NORMAL (ref 4.5–8)
PROTEIN UA: NEGATIVE
SPECIFIC GRAVITY UA: 1.01 (ref 1–1.03)
TRIGL SERPL-MCNC: 141 MG/DL
UROBILINOGEN, URINE: NORMAL
VLDLC SERPL CALC-MCNC: NORMAL MG/DL

## 2018-03-28 ENCOUNTER — TELEPHONE (OUTPATIENT)
Dept: UROLOGY | Age: 58
End: 2018-03-28

## 2018-04-18 LAB — PROSTATE SPECIFIC ANTIGEN: 1.36 NG/ML

## 2018-04-25 ENCOUNTER — HOSPITAL ENCOUNTER (OUTPATIENT)
Dept: PHYSICAL THERAPY | Age: 58
Setting detail: THERAPIES SERIES
Discharge: HOME | End: 2018-04-25
Payer: OTHER GOVERNMENT

## 2018-04-25 ENCOUNTER — HOSPITAL ENCOUNTER (EMERGENCY)
Age: 58
Discharge: HOME | End: 2018-04-25
Payer: OTHER GOVERNMENT

## 2018-04-25 VITALS
HEIGHT: 73 IN | OXYGEN SATURATION: 98 % | TEMPERATURE: 97.2 F | HEART RATE: 98 BPM | BODY MASS INDEX: 27.17 KG/M2 | DIASTOLIC BLOOD PRESSURE: 77 MMHG | WEIGHT: 205 LBS | SYSTOLIC BLOOD PRESSURE: 141 MMHG | RESPIRATION RATE: 20 BRPM

## 2018-04-25 DIAGNOSIS — J06.9 ACUTE UPPER RESPIRATORY INFECTION: Primary | ICD-10-CM

## 2018-04-25 PROCEDURE — 99283 EMERGENCY DEPT VISIT LOW MDM: CPT

## 2018-04-25 RX ORDER — GUAIFENESIN 600 MG/1
1200 TABLET, EXTENDED RELEASE ORAL 2 TIMES DAILY
Qty: 30 TABLET | Refills: 0 | Status: SHIPPED | OUTPATIENT
Start: 2018-04-25 | End: 2018-08-28

## 2018-04-25 RX ORDER — METHYLPREDNISOLONE 4 MG/1
TABLET ORAL
Qty: 1 KIT | Refills: 0 | Status: SHIPPED | OUTPATIENT
Start: 2018-04-25 | End: 2018-05-01

## 2018-04-25 RX ORDER — LEVOFLOXACIN 500 MG/1
500 TABLET, FILM COATED ORAL DAILY
Qty: 10 TABLET | Refills: 0 | Status: SHIPPED | OUTPATIENT
Start: 2018-04-25 | End: 2018-05-05

## 2018-04-25 ASSESSMENT — ENCOUNTER SYMPTOMS
BACK PAIN: 0
DIARRHEA: 0
SHORTNESS OF BREATH: 0
WHEEZING: 1
VOMITING: 0
SORE THROAT: 0
NAUSEA: 0
EYE DISCHARGE: 0
EYE REDNESS: 0
ABDOMINAL PAIN: 0
COUGH: 1
RHINORRHEA: 1

## 2018-04-30 ENCOUNTER — HOSPITAL ENCOUNTER (OUTPATIENT)
Dept: PHYSICAL THERAPY | Age: 58
Setting detail: THERAPIES SERIES
Discharge: HOME | End: 2018-04-30
Payer: OTHER GOVERNMENT

## 2018-04-30 PROCEDURE — 97161 PT EVAL LOW COMPLEX 20 MIN: CPT

## 2018-04-30 PROCEDURE — 97112 NEUROMUSCULAR REEDUCATION: CPT

## 2018-04-30 PROCEDURE — 97110 THERAPEUTIC EXERCISES: CPT

## 2018-04-30 ASSESSMENT — PAIN DESCRIPTION - LOCATION: LOCATION: FOOT

## 2018-04-30 ASSESSMENT — PAIN SCALES - GENERAL: PAINLEVEL_OUTOF10: 3

## 2018-04-30 ASSESSMENT — PAIN DESCRIPTION - PAIN TYPE: TYPE: SURGICAL PAIN

## 2018-04-30 ASSESSMENT — PAIN DESCRIPTION - FREQUENCY: FREQUENCY: INTERMITTENT

## 2018-04-30 ASSESSMENT — PAIN DESCRIPTION - ORIENTATION: ORIENTATION: RIGHT

## 2018-05-11 ENCOUNTER — HOSPITAL ENCOUNTER (OUTPATIENT)
Dept: PHYSICAL THERAPY | Age: 58
Setting detail: THERAPIES SERIES
Discharge: HOME | End: 2018-05-11
Payer: OTHER GOVERNMENT

## 2018-05-11 PROCEDURE — 97112 NEUROMUSCULAR REEDUCATION: CPT

## 2018-05-11 PROCEDURE — 97110 THERAPEUTIC EXERCISES: CPT

## 2018-05-18 ENCOUNTER — HOSPITAL ENCOUNTER (OUTPATIENT)
Dept: PHYSICAL THERAPY | Age: 58
Setting detail: THERAPIES SERIES
Discharge: HOME | End: 2018-05-18
Payer: OTHER GOVERNMENT

## 2018-05-18 PROCEDURE — 97110 THERAPEUTIC EXERCISES: CPT

## 2018-05-18 ASSESSMENT — PAIN DESCRIPTION - PAIN TYPE: TYPE: SURGICAL PAIN

## 2018-05-18 ASSESSMENT — PAIN SCALES - GENERAL: PAINLEVEL_OUTOF10: 3

## 2018-05-18 ASSESSMENT — PAIN DESCRIPTION - LOCATION: LOCATION: FOOT

## 2018-05-18 ASSESSMENT — PAIN DESCRIPTION - ORIENTATION: ORIENTATION: RIGHT

## 2018-05-25 ENCOUNTER — HOSPITAL ENCOUNTER (OUTPATIENT)
Dept: PHYSICAL THERAPY | Age: 58
Setting detail: THERAPIES SERIES
Discharge: HOME | End: 2018-05-25
Payer: OTHER GOVERNMENT

## 2018-08-13 LAB
BILIRUBIN URINE: NORMAL MG/DL
BLOOD, URINE: NORMAL
CLARITY: CLEAR
COLOR: YELLOW
GLUCOSE URINE: NEGATIVE
HCT VFR BLD CALC: 42.6 % (ref 41–53)
HEMOGLOBIN: 14.6 G/DL (ref 13.5–17.5)
KETONES, URINE: NEGATIVE
LEUKOCYTE ESTERASE, URINE: NEGATIVE
NITRITE, URINE: NEGATIVE
PH UA: 6 (ref 4.5–8)
PLATELET # BLD: 230 K/ΜL
PROTEIN UA: NEGATIVE
SPECIFIC GRAVITY UA: 1.01 (ref 1–1.03)
UROBILINOGEN, URINE: NORMAL
WBC # BLD: 5.36 10^3/ML

## 2018-08-28 ENCOUNTER — HOSPITAL ENCOUNTER (EMERGENCY)
Age: 58
Discharge: HOME | End: 2018-08-28
Payer: OTHER GOVERNMENT

## 2018-08-28 ENCOUNTER — APPOINTMENT (OUTPATIENT)
Dept: GENERAL RADIOLOGY | Age: 58
End: 2018-08-28
Payer: OTHER GOVERNMENT

## 2018-08-28 VITALS
HEART RATE: 84 BPM | RESPIRATION RATE: 20 BRPM | OXYGEN SATURATION: 99 % | SYSTOLIC BLOOD PRESSURE: 116 MMHG | DIASTOLIC BLOOD PRESSURE: 81 MMHG | TEMPERATURE: 98.1 F

## 2018-08-28 DIAGNOSIS — J40 BRONCHITIS: Primary | ICD-10-CM

## 2018-08-28 PROCEDURE — 99283 EMERGENCY DEPT VISIT LOW MDM: CPT

## 2018-08-28 PROCEDURE — 6370000000 HC RX 637 (ALT 250 FOR IP): Performed by: PHYSICIAN ASSISTANT

## 2018-08-28 PROCEDURE — 71046 X-RAY EXAM CHEST 2 VIEWS: CPT

## 2018-08-28 RX ORDER — PREDNISONE 20 MG/1
60 TABLET ORAL ONCE
Status: COMPLETED | OUTPATIENT
Start: 2018-08-28 | End: 2018-08-28

## 2018-08-28 RX ORDER — METHYLPREDNISOLONE 4 MG/1
TABLET ORAL
Qty: 1 KIT | Refills: 0 | Status: SHIPPED | OUTPATIENT
Start: 2018-08-28 | End: 2018-09-03

## 2018-08-28 RX ORDER — GUAIFENESIN 600 MG/1
1200 TABLET, EXTENDED RELEASE ORAL 2 TIMES DAILY
Qty: 28 TABLET | Refills: 0 | Status: SHIPPED | OUTPATIENT
Start: 2018-08-28 | End: 2018-09-04

## 2018-08-28 RX ADMIN — PREDNISONE 60 MG: 20 TABLET ORAL at 18:43

## 2018-08-28 ASSESSMENT — ENCOUNTER SYMPTOMS
EYE DISCHARGE: 0
ABDOMINAL PAIN: 0
COUGH: 1
NAUSEA: 0
WHEEZING: 0
VOMITING: 0
SORE THROAT: 0
DIARRHEA: 0
RHINORRHEA: 0
SHORTNESS OF BREATH: 0
BACK PAIN: 0
EYE REDNESS: 0

## 2018-08-28 ASSESSMENT — PAIN DESCRIPTION - PAIN TYPE: TYPE: ACUTE PAIN

## 2018-08-28 ASSESSMENT — PAIN DESCRIPTION - DESCRIPTORS: DESCRIPTORS: ACHING

## 2018-08-28 ASSESSMENT — PAIN DESCRIPTION - FREQUENCY: FREQUENCY: CONTINUOUS

## 2018-08-28 ASSESSMENT — PAIN DESCRIPTION - LOCATION: LOCATION: RIB CAGE

## 2018-08-28 ASSESSMENT — PAIN SCALES - GENERAL: PAINLEVEL_OUTOF10: 5

## 2018-10-14 ENCOUNTER — HOSPITAL ENCOUNTER (EMERGENCY)
Age: 58
Discharge: HOME | End: 2018-10-14
Attending: FAMILY MEDICINE
Payer: OTHER GOVERNMENT

## 2018-10-14 ENCOUNTER — APPOINTMENT (OUTPATIENT)
Dept: GENERAL RADIOLOGY | Age: 58
End: 2018-10-14
Payer: OTHER GOVERNMENT

## 2018-10-14 VITALS
BODY MASS INDEX: 25.18 KG/M2 | OXYGEN SATURATION: 98 % | HEIGHT: 73 IN | WEIGHT: 190 LBS | DIASTOLIC BLOOD PRESSURE: 67 MMHG | SYSTOLIC BLOOD PRESSURE: 114 MMHG | TEMPERATURE: 97.6 F | HEART RATE: 87 BPM | RESPIRATION RATE: 16 BRPM

## 2018-10-14 DIAGNOSIS — M54.50 CHRONIC MIDLINE LOW BACK PAIN WITHOUT SCIATICA: ICD-10-CM

## 2018-10-14 DIAGNOSIS — G89.29 CHRONIC MIDLINE LOW BACK PAIN WITHOUT SCIATICA: ICD-10-CM

## 2018-10-14 DIAGNOSIS — J40 BRONCHITIS: Primary | ICD-10-CM

## 2018-10-14 LAB
ANION GAP SERPL CALCULATED.3IONS-SCNC: 12 MEQ/L (ref 8–16)
BASOPHILS # BLD: 0.7 %
BASOPHILS ABSOLUTE: 0 THOU/MM3 (ref 0–0.1)
BUN BLDV-MCNC: 13 MG/DL (ref 7–22)
CALCIUM SERPL-MCNC: 9 MG/DL (ref 8.5–10.5)
CHLORIDE BLD-SCNC: 101 MEQ/L (ref 98–111)
CO2: 25 MEQ/L (ref 23–33)
CREAT SERPL-MCNC: 0.9 MG/DL (ref 0.4–1.2)
EKG ATRIAL RATE: 72 BPM
EKG P AXIS: 44 DEGREES
EKG P-R INTERVAL: 154 MS
EKG Q-T INTERVAL: 396 MS
EKG QRS DURATION: 80 MS
EKG QTC CALCULATION (BAZETT): 433 MS
EKG R AXIS: 31 DEGREES
EKG T AXIS: 59 DEGREES
EKG VENTRICULAR RATE: 72 BPM
EOSINOPHIL # BLD: 8.5 %
EOSINOPHILS ABSOLUTE: 0.5 THOU/MM3 (ref 0–0.4)
ERYTHROCYTE [DISTWIDTH] IN BLOOD BY AUTOMATED COUNT: 14.4 % (ref 11.5–14.5)
ERYTHROCYTE [DISTWIDTH] IN BLOOD BY AUTOMATED COUNT: 47 FL (ref 35–45)
GFR SERPL CREATININE-BSD FRML MDRD: > 90 ML/MIN/1.73M2
GLUCOSE BLD-MCNC: 93 MG/DL (ref 70–108)
HCT VFR BLD CALC: 40.9 % (ref 42–52)
HEMOGLOBIN: 13.9 GM/DL (ref 14–18)
IMMATURE GRANS (ABS): 0.01 THOU/MM3 (ref 0–0.07)
IMMATURE GRANULOCYTES: 0.2 %
LYMPHOCYTES # BLD: 28.3 %
LYMPHOCYTES ABSOLUTE: 1.7 THOU/MM3 (ref 1–4.8)
MCH RBC QN AUTO: 30 PG (ref 26–33)
MCHC RBC AUTO-ENTMCNC: 34 GM/DL (ref 32.2–35.5)
MCV RBC AUTO: 88.3 FL (ref 80–94)
MONOCYTES # BLD: 12.5 %
MONOCYTES ABSOLUTE: 0.7 THOU/MM3 (ref 0.4–1.3)
NUCLEATED RED BLOOD CELLS: 0 /100 WBC
OSMOLALITY CALCULATION: 275.5 MOSMOL/KG (ref 275–300)
PLATELET # BLD: 212 THOU/MM3 (ref 130–400)
PMV BLD AUTO: 9.7 FL (ref 9.4–12.4)
POTASSIUM SERPL-SCNC: 4.3 MEQ/L (ref 3.5–5.2)
PRO-BNP: 26.8 PG/ML (ref 0–900)
RBC # BLD: 4.63 MILL/MM3 (ref 4.7–6.1)
SEG NEUTROPHILS: 49.8 %
SEGMENTED NEUTROPHILS ABSOLUTE COUNT: 2.9 THOU/MM3 (ref 1.8–7.7)
SODIUM BLD-SCNC: 138 MEQ/L (ref 135–145)
TROPONIN T: < 0.01 NG/ML
WBC # BLD: 5.9 THOU/MM3 (ref 4.8–10.8)

## 2018-10-14 PROCEDURE — 99283 EMERGENCY DEPT VISIT LOW MDM: CPT

## 2018-10-14 PROCEDURE — 83880 ASSAY OF NATRIURETIC PEPTIDE: CPT

## 2018-10-14 PROCEDURE — 72110 X-RAY EXAM L-2 SPINE 4/>VWS: CPT

## 2018-10-14 PROCEDURE — 71046 X-RAY EXAM CHEST 2 VIEWS: CPT

## 2018-10-14 PROCEDURE — 36415 COLL VENOUS BLD VENIPUNCTURE: CPT

## 2018-10-14 PROCEDURE — 80048 BASIC METABOLIC PNL TOTAL CA: CPT

## 2018-10-14 PROCEDURE — 85025 COMPLETE CBC W/AUTO DIFF WBC: CPT

## 2018-10-14 PROCEDURE — 84484 ASSAY OF TROPONIN QUANT: CPT

## 2018-10-14 PROCEDURE — 93005 ELECTROCARDIOGRAM TRACING: CPT | Performed by: PHYSICIAN ASSISTANT

## 2018-10-14 RX ORDER — ORPHENADRINE CITRATE 30 MG/ML
INJECTION INTRAMUSCULAR; INTRAVENOUS
Status: DISCONTINUED
Start: 2018-10-14 | End: 2018-10-14 | Stop reason: HOSPADM

## 2018-10-14 RX ORDER — DEXAMETHASONE SODIUM PHOSPHATE 4 MG/ML
INJECTION, SOLUTION INTRA-ARTICULAR; INTRALESIONAL; INTRAMUSCULAR; INTRAVENOUS; SOFT TISSUE
Status: DISCONTINUED
Start: 2018-10-14 | End: 2018-10-14 | Stop reason: HOSPADM

## 2018-10-14 ASSESSMENT — ENCOUNTER SYMPTOMS
TROUBLE SWALLOWING: 0
ANAL BLEEDING: 0
BLOOD IN STOOL: 0
STRIDOR: 0
APNEA: 0
COUGH: 1
SINUS PAIN: 0
COLOR CHANGE: 0
SINUS PRESSURE: 0
RECTAL PAIN: 0
FACIAL SWELLING: 0
EYE ITCHING: 0
SORE THROAT: 0
RHINORRHEA: 1
EYE REDNESS: 0
VOMITING: 0
BACK PAIN: 1
VOICE CHANGE: 0

## 2018-10-14 ASSESSMENT — PAIN SCALES - GENERAL: PAINLEVEL_OUTOF10: 7

## 2018-10-14 ASSESSMENT — PAIN DESCRIPTION - ORIENTATION: ORIENTATION: LOWER

## 2018-10-14 ASSESSMENT — PAIN DESCRIPTION - DESCRIPTORS: DESCRIPTORS: ACHING

## 2018-10-14 ASSESSMENT — PAIN DESCRIPTION - PAIN TYPE: TYPE: ACUTE PAIN

## 2018-10-14 ASSESSMENT — PAIN DESCRIPTION - LOCATION: LOCATION: BACK

## 2018-10-14 NOTE — ED NOTES
Patient to ED for back pain. 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 Patient also complains of cough for several days.       Eleno Hwang RN  10/14/18 8330

## 2018-10-15 PROCEDURE — 93010 ELECTROCARDIOGRAM REPORT: CPT | Performed by: INTERNAL MEDICINE

## 2018-10-15 NOTE — ED PROVIDER NOTES
[address] [facility] [address] [address] EMERGENCY DEPT  eMERGENCY dEPARTMENT eNCOUnter      Pt Name: Carlyn Leslie  MRN: [id number]  [place] 1960  Date of evaluation: 10/14/2018  Provider: Yumiko Cardenas MD    CHIEF COMPLAINT     Chief Complaint   Patient presents with    Back Pain    Cough       HISTORY OF PRESENT ILLNESS    Carlyn Leslie is a 62 y.o. male who presents to the emergency department from home with back pain and URI/cough. Patient states that he has history of chronic back pain but reports that his pain got worse than his baseline since he lifted a heavy object two days ago. Patient describes his pain as aching moderate in intensity with no radiation. Aggravated by movement. Slight improvement with tyenol and gabepentin. Patient denies fever, chills, urinary symptoms, history of aortic aneurysm, urinary/stool incontinence/retention, weakness, numbness, or history of IV drug abuse. Patient also reports that he has been having rhinorrhea for the last few days and cough non productive. Denies fever, chills, nausea vomiting, voice changes, dysphagia, odynophagia, chest pain, sob, racing heart. Triage notes and Nursing notes were reviewed by myself. Any discrepancies are addressed above.     PAST MEDICAL HISTORY     Past Medical History:   Diagnosis Date    Bipolar 1 disorder ([facility] [address]     Hypertension        SURGICAL HISTORY       Past Surgical History:   Procedure Laterality Date    FOOT SURGERY Right        CURRENT MEDICATIONS       Discharge Medication List as of 10/14/2018  3:41 AM      CONTINUE these medications which have NOT CHANGED    Details   gabapentin (NEURONTIN) 300 MG capsule Take 1 capsule by mouth every evening, Disp-15 capsule, R-0Print      doxycycline hyclate (VIBRAMYCIN) 100 MG capsule Take 100 mg by mouth 2 times dailyHistorical Med      benzonatate (TESSALON PERLES) 100 MG capsule Take 1 capsule by mouth 3 times daily as needed for Cough, Disp-20 capsule, R-0Print      etodolac (LODINE

## 2018-10-22 DIAGNOSIS — N52.9 ERECTILE DYSFUNCTION, UNSPECIFIED ERECTILE DYSFUNCTION TYPE: ICD-10-CM

## 2018-10-22 DIAGNOSIS — N40.0 BENIGN PROSTATIC HYPERPLASIA, UNSPECIFIED WHETHER LOWER URINARY TRACT SYMPTOMS PRESENT: Primary | ICD-10-CM

## 2018-11-08 ENCOUNTER — TELEPHONE (OUTPATIENT)
Dept: UROLOGY | Age: 58
End: 2018-11-08

## 2018-11-08 NOTE — TELEPHONE ENCOUNTER
Called pt L/M per hipaa pt needs to call PCP and get a new auth for [facility] out[facility]. He is scheduled to see Kan Todd on 11/16. Told him to contact asap to get it approved by his appt next week.

## 2018-11-13 ENCOUNTER — TELEPHONE (OUTPATIENT)
Dept: UROLOGY | Age: 58
End: 2018-11-13

## 2018-11-13 NOTE — TELEPHONE ENCOUNTER
Called patient after he stopped in to check on [address] approval, I told him he has to request an approval they wont let us, He needs to tell them to have them get it done stat due to an appt on Friday. I did speak with Oma Amos and she will put a note in, to remind the pt.  If patient calls back, just please remind him to ask for it or he will have to r/s

## 2018-11-14 ENCOUNTER — HOSPITAL ENCOUNTER (OUTPATIENT)
Age: 58
Discharge: HOME | End: 2018-11-14
Payer: OTHER GOVERNMENT

## 2018-11-14 DIAGNOSIS — N52.9 ERECTILE DYSFUNCTION, UNSPECIFIED ERECTILE DYSFUNCTION TYPE: ICD-10-CM

## 2018-11-14 DIAGNOSIS — N40.0 BENIGN PROSTATIC HYPERPLASIA, UNSPECIFIED WHETHER LOWER URINARY TRACT SYMPTOMS PRESENT: ICD-10-CM

## 2018-11-14 LAB — PROSTATE SPECIFIC ANTIGEN: 1.08 NG/ML (ref 0–1)

## 2018-11-14 PROCEDURE — 36415 COLL VENOUS BLD VENIPUNCTURE: CPT

## 2018-11-14 PROCEDURE — 84153 ASSAY OF PSA TOTAL: CPT

## 2018-11-15 ENCOUNTER — TELEPHONE (OUTPATIENT)
Dept: UROLOGY | Age: 58
End: 2018-11-15

## 2018-11-15 NOTE — TELEPHONE ENCOUNTER
Called patient L/M he needs to r/s his appt from 11/16/18. [facility] approval is not [facility]. Still waiting. Multiple attempts to reach patient .         Thanks

## 2018-11-16 ENCOUNTER — TELEPHONE (OUTPATIENT)
Dept: UROLOGY | Age: 58
End: 2018-11-16

## 2018-11-18 ENCOUNTER — APPOINTMENT (OUTPATIENT)
Dept: GENERAL RADIOLOGY | Age: 58
End: 2018-11-18
Payer: OTHER GOVERNMENT

## 2018-11-18 ENCOUNTER — HOSPITAL ENCOUNTER (EMERGENCY)
Age: 58
Discharge: HOME | End: 2018-11-19
Attending: EMERGENCY MEDICINE
Payer: OTHER GOVERNMENT

## 2018-11-18 DIAGNOSIS — J40 BRONCHITIS: ICD-10-CM

## 2018-11-18 DIAGNOSIS — J44.1 COPD EXACERBATION (HCC): Primary | ICD-10-CM

## 2018-11-18 LAB
ANION GAP SERPL CALCULATED.3IONS-SCNC: 14 MEQ/L (ref 8–16)
BASOPHILS # BLD: 0.3 %
BASOPHILS ABSOLUTE: 0 THOU/MM3 (ref 0–0.1)
BUN BLDV-MCNC: 14 MG/DL (ref 7–22)
CALCIUM SERPL-MCNC: 9.1 MG/DL (ref 8.5–10.5)
CHLORIDE BLD-SCNC: 100 MEQ/L (ref 98–111)
CO2: 22 MEQ/L (ref 23–33)
CREAT SERPL-MCNC: 1.1 MG/DL (ref 0.4–1.2)
EKG ATRIAL RATE: 109 BPM
EKG P AXIS: 58 DEGREES
EKG P-R INTERVAL: 138 MS
EKG Q-T INTERVAL: 328 MS
EKG QRS DURATION: 82 MS
EKG QTC CALCULATION (BAZETT): 441 MS
EKG R AXIS: 64 DEGREES
EKG T AXIS: 73 DEGREES
EKG VENTRICULAR RATE: 109 BPM
EOSINOPHIL # BLD: 1.7 %
EOSINOPHILS ABSOLUTE: 0.2 THOU/MM3 (ref 0–0.4)
ERYTHROCYTE [DISTWIDTH] IN BLOOD BY AUTOMATED COUNT: 15.3 % (ref 11.5–14.5)
ERYTHROCYTE [DISTWIDTH] IN BLOOD BY AUTOMATED COUNT: 49.9 FL (ref 35–45)
GFR SERPL CREATININE-BSD FRML MDRD: 83 ML/MIN/1.73M2
GLUCOSE BLD-MCNC: 123 MG/DL (ref 70–108)
HCT VFR BLD CALC: 39.7 % (ref 42–52)
HEMOGLOBIN: 13.6 GM/DL (ref 14–18)
IMMATURE GRANS (ABS): 0.17 THOU/MM3 (ref 0–0.07)
IMMATURE GRANULOCYTES: 1.4 %
LYMPHOCYTES # BLD: 21.7 %
LYMPHOCYTES ABSOLUTE: 2.6 THOU/MM3 (ref 1–4.8)
MCH RBC QN AUTO: 30.4 PG (ref 26–33)
MCHC RBC AUTO-ENTMCNC: 34.3 GM/DL (ref 32.2–35.5)
MCV RBC AUTO: 88.6 FL (ref 80–94)
MONOCYTES # BLD: 10.9 %
MONOCYTES ABSOLUTE: 1.3 THOU/MM3 (ref 0.4–1.3)
NUCLEATED RED BLOOD CELLS: 0 /100 WBC
OSMOLALITY CALCULATION: 273.8 MOSMOL/KG (ref 275–300)
PLATELET # BLD: 266 THOU/MM3 (ref 130–400)
PMV BLD AUTO: 9.7 FL (ref 9.4–12.4)
POTASSIUM SERPL-SCNC: 4.1 MEQ/L (ref 3.5–5.2)
RBC # BLD: 4.48 MILL/MM3 (ref 4.7–6.1)
SEG NEUTROPHILS: 64 %
SEGMENTED NEUTROPHILS ABSOLUTE COUNT: 7.8 THOU/MM3 (ref 1.8–7.7)
SODIUM BLD-SCNC: 136 MEQ/L (ref 135–145)
WBC # BLD: 12.2 THOU/MM3 (ref 4.8–10.8)

## 2018-11-18 PROCEDURE — 93005 ELECTROCARDIOGRAM TRACING: CPT | Performed by: FAMILY MEDICINE

## 2018-11-18 PROCEDURE — 71045 X-RAY EXAM CHEST 1 VIEW: CPT

## 2018-11-18 PROCEDURE — 80048 BASIC METABOLIC PNL TOTAL CA: CPT

## 2018-11-18 PROCEDURE — 83880 ASSAY OF NATRIURETIC PEPTIDE: CPT

## 2018-11-18 PROCEDURE — 85025 COMPLETE CBC W/AUTO DIFF WBC: CPT

## 2018-11-18 PROCEDURE — 99284 EMERGENCY DEPT VISIT MOD MDM: CPT

## 2018-11-18 PROCEDURE — 84484 ASSAY OF TROPONIN QUANT: CPT

## 2018-11-18 PROCEDURE — 2709999900 HC NON-CHARGEABLE SUPPLY

## 2018-11-18 PROCEDURE — 36415 COLL VENOUS BLD VENIPUNCTURE: CPT

## 2018-11-18 RX ORDER — IPRATROPIUM BROMIDE AND ALBUTEROL SULFATE 2.5; .5 MG/3ML; MG/3ML
1 SOLUTION RESPIRATORY (INHALATION) ONCE
Status: COMPLETED | OUTPATIENT
Start: 2018-11-18 | End: 2018-11-19

## 2018-11-19 ENCOUNTER — TELEPHONE (OUTPATIENT)
Dept: UROLOGY | Age: 58
End: 2018-11-19

## 2018-11-19 VITALS
OXYGEN SATURATION: 97 % | TEMPERATURE: 98.4 F | DIASTOLIC BLOOD PRESSURE: 89 MMHG | HEIGHT: 74 IN | HEART RATE: 98 BPM | BODY MASS INDEX: 25.67 KG/M2 | RESPIRATION RATE: 20 BRPM | WEIGHT: 200 LBS | SYSTOLIC BLOOD PRESSURE: 110 MMHG

## 2018-11-19 LAB
AMPHETAMINE+METHAMPHETAMINE URINE SCREEN: NEGATIVE
BACTERIA: ABNORMAL /HPF
BARBITURATE QUANTITATIVE URINE: NEGATIVE
BENZODIAZEPINE QUANTITATIVE URINE: NEGATIVE
BILIRUBIN URINE: NEGATIVE
BLOOD, URINE: ABNORMAL
CANNABINOID QUANTITATIVE URINE: NEGATIVE
CASTS 2: ABNORMAL /LPF
CASTS UA: ABNORMAL /LPF
CHARACTER, URINE: CLEAR
COCAINE METABOLITE QUANTITATIVE URINE: NEGATIVE
COLOR: YELLOW
CRYSTALS, UA: ABNORMAL
EPITHELIAL CELLS, UA: ABNORMAL /HPF
GLUCOSE URINE: NEGATIVE MG/DL
KETONES, URINE: NEGATIVE
LEUKOCYTE ESTERASE, URINE: NEGATIVE
MISCELLANEOUS 2: ABNORMAL
NITRITE, URINE: NEGATIVE
OPIATES, URINE: NEGATIVE
OXYCODONE: NEGATIVE
PH UA: 7
PHENCYCLIDINE QUANTITATIVE URINE: NEGATIVE
PRO-BNP: 44.1 PG/ML (ref 0–900)
PROTEIN UA: NEGATIVE
RBC URINE: ABNORMAL /HPF
RENAL EPITHELIAL, UA: ABNORMAL
SPECIFIC GRAVITY, URINE: 1 (ref 1–1.03)
TROPONIN T: < 0.01 NG/ML
UROBILINOGEN, URINE: 0.2 EU/DL
WBC UA: ABNORMAL /HPF
YEAST: ABNORMAL

## 2018-11-19 PROCEDURE — 94640 AIRWAY INHALATION TREATMENT: CPT

## 2018-11-19 PROCEDURE — 81001 URINALYSIS AUTO W/SCOPE: CPT

## 2018-11-19 PROCEDURE — 80307 DRUG TEST PRSMV CHEM ANLYZR: CPT

## 2018-11-19 PROCEDURE — 6370000000 HC RX 637 (ALT 250 FOR IP): Performed by: EMERGENCY MEDICINE

## 2018-11-19 RX ORDER — IPRATROPIUM BROMIDE AND ALBUTEROL SULFATE 2.5; .5 MG/3ML; MG/3ML
2 SOLUTION RESPIRATORY (INHALATION) ONCE
Status: DISCONTINUED | OUTPATIENT
Start: 2018-11-19 | End: 2018-11-19 | Stop reason: HOSPADM

## 2018-11-19 RX ORDER — PREDNISONE 20 MG/1
20 TABLET ORAL 2 TIMES DAILY
Qty: 10 TABLET | Refills: 0 | Status: SHIPPED | OUTPATIENT
Start: 2018-11-19 | End: 2018-11-24

## 2018-11-19 RX ORDER — PREDNISONE 20 MG/1
40 TABLET ORAL ONCE
Status: COMPLETED | OUTPATIENT
Start: 2018-11-19 | End: 2018-11-19

## 2018-11-19 RX ORDER — AZITHROMYCIN 250 MG/1
TABLET, FILM COATED ORAL
Qty: 1 PACKET | Refills: 0 | Status: SHIPPED | OUTPATIENT
Start: 2018-11-19 | End: 2018-11-29

## 2018-11-19 RX ADMIN — IPRATROPIUM BROMIDE AND ALBUTEROL SULFATE 1 AMPULE: .5; 3 SOLUTION RESPIRATORY (INHALATION) at 00:41

## 2018-11-19 RX ADMIN — PREDNISONE 40 MG: 20 TABLET ORAL at 01:24

## 2018-11-19 NOTE — ED PROVIDER NOTES
[facility] EMERGENCY DEPT      CHIEF COMPLAINT       Chief Complaint   Patient presents with    Shortness of Breath       Nurses Notes reviewed and I agree except as noted in the HPI. HISTORY OF PRESENT ILLNESS    Radha Gaviria is a 62 y.o. male who presents for the evaluation of a productive cough and shortness of breath. The patient states that his symptoms have been ongoing for the past 4 days. The patient states that he had a similar URI 1 month ago which improved with steroids but has now returned. The patient states that he has used an albuterol inhaler without improvement. The patient states that he has abdominal pain with coughing. The patient admits smoking 5 cigarettes a day. The patient denies any CAD, COPD, or emphysema. Onset: 4 days ago  Duration: continous  Timing: continuous  Location of Pain: none  Intesity/severity: moderate  Modifying Factors: no relief with home albuterol treatment  Relieved by; unknown  Previous Episodes; Yes, 1 month ago  Tx Before arrival: home albuterol treatment    REVIEW OF SYSTEMS      Review of Systems   Constitutional: Negative for fever, chills, diaphoresis and fatigue. HENT: Negative for congestion, drooling, facial swelling and sore throat. Eyes: Negative for photophobia, pain and discharge. Respiratory: Positive for a productive cough and shortness of breath, Negative for wheezing and stridor. Cardiovascular: Negative for chest pain, palpitations and leg swelling. Gastrointestinal: Negative for abdominal pain, blood in stool and abdominal distention. Endocrine: Negative for cold intolerance, heat intolerance, polydipsia and polyuria. Genitourinary: Negative for dysuria, urgency, hematuria and difficulty urinating. Musculoskeletal: Negative for gait problem, neck pain and neck stiffness. Allergic/Immunologic: Negative for environmental allergies, food allergies and immunocompromised state.    Skin; No rash, No itching  Neurological: [facility]       EMERGENCY DEPARTMENT COURSE:   Vitals:    Vitals:    11/18/18 2307 11/19/18 0011 11/19/18 0115   BP: (!) 143/85 (!) 127/93 110/89   Pulse: 105 110 98   Resp: 25 26 20   Temp: 98.4 °F (36.9 °C)     TempSrc: Oral     SpO2: 97% 97% 97%   Weight: 200 lb (90.7 kg)     Height: 6' 2\" (1.88 m)       The patient was seen and evaluated in a timely manner for a productive cough and shortness of breath. His vital signs were stable. During the physical exam I noted the patient was tachycardic. He had mild work of breathing and diffuse expiratory wheezes. He also reported abdominal pain with coughing. I ordered appropriate labs and a chest x-ray. The x-ray showed COPD with no acute findings. Laboratory and imaging results were reviewed and discussed with the patient. Within the department, the patient was treated with duoneb, and deltasone. The patient responded well to treatment, and I noted his condition to improve. On reevaluation the patient was noted to no longer be exhibiting tachycardia or expiratory wheezes. I decided that the patient could be discharged home. I wrote him prescriptions for zithromax and deltasone which will be taken as directed. I explained my proposed course of discharge to the patient, and he verbalized understanding and agreement with my proposed plan. All his questions were addressed at bedside. The patient will return to the emergency department for any new or worsening symptoms. CRITICAL CARE:   None    CONSULTS:  None    PROCEDURES:  None    FINAL IMPRESSION      1. COPD exacerbation ([facility] [address]    2. Bronchitis          DISPOSITION/PLAN   Decision To Discharge    PATIENT REFERRED TO:  No follow-up provider specified. DISCHARGE MEDICATIONS:  New Prescriptions    AZITHROMYCIN (ZITHROMAX) 250 MG TABLET    Take 2 tablets (500 mg) on Day 1, followed by 1 tablet (250 mg) once daily on Days 2 through 5.     PREDNISONE (DELTASONE) 20 MG TABLET    Take 1 tablet by mouth 2 times daily for 5 days

## 2018-11-19 NOTE — TELEPHONE ENCOUNTER
Patient stopped by needs a new rx sent for trimex, stated it will  next month. Patient has an appt to see you [phone]. Also r/s his appt from 1/10 to  few days sooner that was first available.      Please advise   thanks

## 2018-11-19 NOTE — ED NOTES
Pt resting quietly in room no needs expressed. Side rails up x2 with call light in reach. Will continue to monitor.         Hildy Sicard, [address]  11/19/18 0018

## 2018-11-20 PROCEDURE — 93010 ELECTROCARDIOGRAM REPORT: CPT | Performed by: INTERNAL MEDICINE

## 2018-12-17 ENCOUNTER — APPOINTMENT (OUTPATIENT)
Dept: GENERAL RADIOLOGY | Age: 58
End: 2018-12-17
Payer: OTHER GOVERNMENT

## 2018-12-17 ENCOUNTER — HOSPITAL ENCOUNTER (EMERGENCY)
Age: 58
Discharge: HOME | End: 2018-12-17
Payer: OTHER GOVERNMENT

## 2018-12-17 VITALS
WEIGHT: 198 LBS | DIASTOLIC BLOOD PRESSURE: 89 MMHG | BODY MASS INDEX: 25.41 KG/M2 | HEIGHT: 74 IN | TEMPERATURE: 97.9 F | OXYGEN SATURATION: 98 % | SYSTOLIC BLOOD PRESSURE: 134 MMHG | HEART RATE: 91 BPM | RESPIRATION RATE: 16 BRPM

## 2018-12-17 DIAGNOSIS — M54.50 ACUTE RIGHT-SIDED LOW BACK PAIN WITHOUT SCIATICA: Primary | ICD-10-CM

## 2018-12-17 PROCEDURE — 6360000002 HC RX W HCPCS: Performed by: PHYSICIAN ASSISTANT

## 2018-12-17 PROCEDURE — 72100 X-RAY EXAM L-S SPINE 2/3 VWS: CPT

## 2018-12-17 PROCEDURE — 6370000000 HC RX 637 (ALT 250 FOR IP): Performed by: PHYSICIAN ASSISTANT

## 2018-12-17 PROCEDURE — 99283 EMERGENCY DEPT VISIT LOW MDM: CPT

## 2018-12-17 PROCEDURE — 96372 THER/PROPH/DIAG INJ SC/IM: CPT

## 2018-12-17 RX ORDER — KETOROLAC TROMETHAMINE 30 MG/ML
30 INJECTION, SOLUTION INTRAMUSCULAR; INTRAVENOUS ONCE
Status: COMPLETED | OUTPATIENT
Start: 2018-12-17 | End: 2018-12-17

## 2018-12-17 RX ORDER — METHYLPREDNISOLONE SODIUM SUCCINATE 125 MG/2ML
80 INJECTION, POWDER, LYOPHILIZED, FOR SOLUTION INTRAMUSCULAR; INTRAVENOUS ONCE
Status: COMPLETED | OUTPATIENT
Start: 2018-12-17 | End: 2018-12-17

## 2018-12-17 RX ORDER — CYCLOBENZAPRINE HCL 10 MG
10 TABLET ORAL 3 TIMES DAILY PRN
Qty: 15 TABLET | Refills: 0 | Status: SHIPPED | OUTPATIENT
Start: 2018-12-17 | End: 2018-12-22

## 2018-12-17 RX ORDER — ACETAMINOPHEN 500 MG
500 TABLET ORAL EVERY 6 HOURS PRN
Qty: 120 TABLET | Refills: 0 | Status: SHIPPED | OUTPATIENT
Start: 2018-12-17 | End: 2021-07-08

## 2018-12-17 RX ORDER — CYCLOBENZAPRINE HCL 10 MG
10 TABLET ORAL ONCE
Status: COMPLETED | OUTPATIENT
Start: 2018-12-17 | End: 2018-12-17

## 2018-12-17 RX ORDER — PREDNISONE 20 MG/1
20 TABLET ORAL 2 TIMES DAILY
Qty: 10 TABLET | Refills: 0 | Status: SHIPPED | OUTPATIENT
Start: 2018-12-17 | End: 2018-12-22

## 2018-12-17 RX ADMIN — METHYLPREDNISOLONE SODIUM SUCCINATE 80 MG: 125 INJECTION, POWDER, FOR SOLUTION INTRAMUSCULAR; INTRAVENOUS at 21:33

## 2018-12-17 RX ADMIN — CYCLOBENZAPRINE HYDROCHLORIDE 10 MG: 10 TABLET, FILM COATED ORAL at 21:32

## 2018-12-17 RX ADMIN — KETOROLAC TROMETHAMINE 30 MG: 30 INJECTION, SOLUTION INTRAMUSCULAR at 21:32

## 2018-12-17 ASSESSMENT — ENCOUNTER SYMPTOMS
COLOR CHANGE: 0
EYE REDNESS: 0
VOMITING: 0
SHORTNESS OF BREATH: 0
RHINORRHEA: 0
SORE THROAT: 0
NAUSEA: 0
ABDOMINAL PAIN: 0
COUGH: 0
WHEEZING: 0
EYE DISCHARGE: 0
DIARRHEA: 0
CONSTIPATION: 0

## 2018-12-17 ASSESSMENT — PAIN SCALES - GENERAL: PAINLEVEL_OUTOF10: 9

## 2018-12-18 NOTE — ED PROVIDER NOTES
Stable appearing chronic degenerative spondylosis. **This report has been created using voice recognition software. It may contain minor errors which are inherent in voice recognition technology. **      Final report electronically signed by Dr. Alis Velazquez on 12/17/2018 10:19 PM          LABS:   Labs Reviewed - No data to display    EMERGENCY DEPARTMENT COURSE:   Vitals:    Vitals:    12/17/18 2109   BP: 134/89   Pulse: 91   Resp: 16   Temp: 97.9 °F (36.6 °C)   TempSrc: Oral   SpO2: 98%   Weight: 198 lb (89.8 kg)   Height: 6' 2\" (1.88 m)     2127 Xray of lumbar spine ordered. Solu-medrol, Flexeril, and Toradol ordered for symptom control in the meantime    The patient was seen and evaluated within the ED today with right lower back pain. Within the department, I observed the patient's vital signs to be within acceptable range. On exam, I appreciated no midline spinal or paraspinal tenderness of the cervical, thoracic, or lumbar spine. The patient has good spinal ROM. Negative SLR bilaterally. Patient has full ROM and strength of the lower extremities. There is intact sensation of soft touch in the lower extremities. The lower extremities appear to be neurovascularly intact. Radiologic studies within the department revealed no acute fracture, dislocation, or soft tissue abnormality. Within the department, the patient was treated with Toradol, Solumedrol, and Flexeril. I observed the patient's condition to improve during the duration of the stay. I explained my proposed course of treatment to the patient, who was amenable to my treatment and discharge decisions. He was discharged home in stable condition with prescriptions for Tylenol, Prednisone, and Flexeril, and the patient will return to the ED if the symptoms become more severe in nature or otherwise change.  I discussed with the patient that Flexeril is sedating and advised the patient to caution while driving, operating heavy machinery, or drink

## 2018-12-19 ASSESSMENT — ENCOUNTER SYMPTOMS: BACK PAIN: 1

## 2019-01-07 ENCOUNTER — OFFICE VISIT (OUTPATIENT)
Dept: UROLOGY | Age: 59
End: 2019-01-07
Payer: OTHER GOVERNMENT

## 2019-01-07 VITALS
HEIGHT: 73 IN | DIASTOLIC BLOOD PRESSURE: 82 MMHG | BODY MASS INDEX: 25.45 KG/M2 | WEIGHT: 192 LBS | SYSTOLIC BLOOD PRESSURE: 126 MMHG

## 2019-01-07 DIAGNOSIS — N52.9 ERECTILE DYSFUNCTION, UNSPECIFIED ERECTILE DYSFUNCTION TYPE: Primary | ICD-10-CM

## 2019-01-07 PROCEDURE — 99213 OFFICE O/P EST LOW 20 MIN: CPT | Performed by: NURSE PRACTITIONER

## 2019-03-15 ENCOUNTER — HOSPITAL ENCOUNTER (OUTPATIENT)
Dept: ULTRASOUND IMAGING | Age: 59
Discharge: HOME | End: 2019-03-15
Payer: OTHER GOVERNMENT

## 2019-03-15 DIAGNOSIS — R11.2 NAUSEA AND VOMITING, INTRACTABILITY OF VOMITING NOT SPECIFIED, UNSPECIFIED VOMITING TYPE: ICD-10-CM

## 2019-03-15 DIAGNOSIS — R10.32 LLQ ABDOMINAL PAIN: ICD-10-CM

## 2019-03-15 DIAGNOSIS — R10.12 LUQ ABDOMINAL PAIN: ICD-10-CM

## 2019-03-15 DIAGNOSIS — K92.1 BLOOD IN STOOL: ICD-10-CM

## 2019-03-15 PROCEDURE — 76705 ECHO EXAM OF ABDOMEN: CPT

## 2019-03-24 ENCOUNTER — HOSPITAL ENCOUNTER (EMERGENCY)
Age: 59
Discharge: HOME | End: 2019-03-24
Payer: OTHER GOVERNMENT

## 2019-03-24 VITALS
SYSTOLIC BLOOD PRESSURE: 125 MMHG | HEART RATE: 86 BPM | RESPIRATION RATE: 20 BRPM | TEMPERATURE: 98.4 F | OXYGEN SATURATION: 98 % | DIASTOLIC BLOOD PRESSURE: 92 MMHG

## 2019-03-24 DIAGNOSIS — M54.50 ACUTE EXACERBATION OF CHRONIC LOW BACK PAIN: Primary | ICD-10-CM

## 2019-03-24 DIAGNOSIS — G89.29 ACUTE EXACERBATION OF CHRONIC LOW BACK PAIN: Primary | ICD-10-CM

## 2019-03-24 PROCEDURE — 99283 EMERGENCY DEPT VISIT LOW MDM: CPT

## 2019-03-24 PROCEDURE — 6360000002 HC RX W HCPCS: Performed by: EMERGENCY MEDICINE

## 2019-03-24 PROCEDURE — 6370000000 HC RX 637 (ALT 250 FOR IP): Performed by: EMERGENCY MEDICINE

## 2019-03-24 PROCEDURE — 96374 THER/PROPH/DIAG INJ IV PUSH: CPT

## 2019-03-24 RX ORDER — CYCLOBENZAPRINE HCL 10 MG
10 TABLET ORAL ONCE
Status: COMPLETED | OUTPATIENT
Start: 2019-03-24 | End: 2019-03-24

## 2019-03-24 RX ORDER — PREDNISONE 20 MG/1
20 TABLET ORAL 2 TIMES DAILY
Qty: 10 TABLET | Refills: 0 | Status: SHIPPED | OUTPATIENT
Start: 2019-03-24 | End: 2019-03-29

## 2019-03-24 RX ORDER — KETOROLAC TROMETHAMINE 30 MG/ML
30 INJECTION, SOLUTION INTRAMUSCULAR; INTRAVENOUS ONCE
Status: COMPLETED | OUTPATIENT
Start: 2019-03-24 | End: 2019-03-24

## 2019-03-24 RX ADMIN — CYCLOBENZAPRINE HYDROCHLORIDE 10 MG: 10 TABLET, FILM COATED ORAL at 16:08

## 2019-03-24 RX ADMIN — KETOROLAC TROMETHAMINE 30 MG: 30 INJECTION, SOLUTION INTRAMUSCULAR at 16:08

## 2019-03-24 ASSESSMENT — ENCOUNTER SYMPTOMS
BACK PAIN: 1
COUGH: 0
DIARRHEA: 0
RHINORRHEA: 0
WHEEZING: 0
EYE REDNESS: 0
SORE THROAT: 0
VOMITING: 0
EYE DISCHARGE: 0
ABDOMINAL PAIN: 0
SHORTNESS OF BREATH: 0
NAUSEA: 0

## 2019-03-24 ASSESSMENT — PAIN SCALES - GENERAL
PAINLEVEL_OUTOF10: 8
PAINLEVEL_OUTOF10: 8

## 2019-03-24 ASSESSMENT — PAIN DESCRIPTION - DESCRIPTORS: DESCRIPTORS: ACHING

## 2019-03-24 ASSESSMENT — PAIN DESCRIPTION - LOCATION: LOCATION: BACK

## 2019-03-24 ASSESSMENT — PAIN DESCRIPTION - PAIN TYPE: TYPE: ACUTE PAIN

## 2019-06-21 ENCOUNTER — ANESTHESIA (OUTPATIENT)
Dept: ENDOSCOPY | Age: 59
End: 2019-06-21
Payer: MEDICARE

## 2019-06-21 ENCOUNTER — HOSPITAL ENCOUNTER (OUTPATIENT)
Age: 59
Setting detail: OUTPATIENT SURGERY
Discharge: HOME | End: 2019-06-21
Attending: INTERNAL MEDICINE | Admitting: INTERNAL MEDICINE
Payer: MEDICARE

## 2019-06-21 ENCOUNTER — ANESTHESIA EVENT (OUTPATIENT)
Dept: ENDOSCOPY | Age: 59
End: 2019-06-21
Payer: MEDICARE

## 2019-06-21 VITALS
RESPIRATION RATE: 22 BRPM | OXYGEN SATURATION: 98 % | SYSTOLIC BLOOD PRESSURE: 112 MMHG | DIASTOLIC BLOOD PRESSURE: 76 MMHG

## 2019-06-21 VITALS
RESPIRATION RATE: 18 BRPM | HEIGHT: 74 IN | SYSTOLIC BLOOD PRESSURE: 110 MMHG | TEMPERATURE: 98 F | BODY MASS INDEX: 24.54 KG/M2 | DIASTOLIC BLOOD PRESSURE: 77 MMHG | OXYGEN SATURATION: 98 % | HEART RATE: 83 BPM | WEIGHT: 191.2 LBS

## 2019-06-21 PROCEDURE — 6360000002 HC RX W HCPCS: Performed by: NURSE ANESTHETIST, CERTIFIED REGISTERED

## 2019-06-21 PROCEDURE — 3609009500 HC COLONOSCOPY DIAGNOSTIC OR SCREENING: Performed by: INTERNAL MEDICINE

## 2019-06-21 PROCEDURE — 7100000001 HC PACU RECOVERY - ADDTL 15 MIN: Performed by: INTERNAL MEDICINE

## 2019-06-21 PROCEDURE — 2580000003 HC RX 258: Performed by: INTERNAL MEDICINE

## 2019-06-21 PROCEDURE — 88305 TISSUE EXAM BY PATHOLOGIST: CPT

## 2019-06-21 PROCEDURE — 3700000000 HC ANESTHESIA ATTENDED CARE: Performed by: INTERNAL MEDICINE

## 2019-06-21 PROCEDURE — 2500000003 HC RX 250 WO HCPCS: Performed by: NURSE ANESTHETIST, CERTIFIED REGISTERED

## 2019-06-21 PROCEDURE — 7100000000 HC PACU RECOVERY - FIRST 15 MIN: Performed by: INTERNAL MEDICINE

## 2019-06-21 PROCEDURE — 3700000001 HC ADD 15 MINUTES (ANESTHESIA): Performed by: INTERNAL MEDICINE

## 2019-06-21 PROCEDURE — 2709999900 HC NON-CHARGEABLE SUPPLY: Performed by: INTERNAL MEDICINE

## 2019-06-21 PROCEDURE — 3609012400 HC EGD TRANSORAL BIOPSY SINGLE/MULTIPLE: Performed by: INTERNAL MEDICINE

## 2019-06-21 RX ORDER — SODIUM CHLORIDE 450 MG/100ML
INJECTION, SOLUTION INTRAVENOUS CONTINUOUS
Status: DISCONTINUED | OUTPATIENT
Start: 2019-06-21 | End: 2019-06-21 | Stop reason: HOSPADM

## 2019-06-21 RX ORDER — LIDOCAINE HYDROCHLORIDE 20 MG/ML
INJECTION, SOLUTION INFILTRATION; PERINEURAL PRN
Status: DISCONTINUED | OUTPATIENT
Start: 2019-06-21 | End: 2019-06-21 | Stop reason: SDUPTHER

## 2019-06-21 RX ORDER — PROPOFOL 10 MG/ML
INJECTION, EMULSION INTRAVENOUS PRN
Status: DISCONTINUED | OUTPATIENT
Start: 2019-06-21 | End: 2019-06-21 | Stop reason: SDUPTHER

## 2019-06-21 RX ADMIN — PROPOFOL 100 MG: 10 INJECTION, EMULSION INTRAVENOUS at 10:51

## 2019-06-21 RX ADMIN — PROPOFOL 100 MG: 10 INJECTION, EMULSION INTRAVENOUS at 11:02

## 2019-06-21 RX ADMIN — SODIUM CHLORIDE: 4.5 INJECTION, SOLUTION INTRAVENOUS at 09:55

## 2019-06-21 RX ADMIN — SODIUM CHLORIDE: 4.5 INJECTION, SOLUTION INTRAVENOUS at 10:50

## 2019-06-21 RX ADMIN — LIDOCAINE HYDROCHLORIDE 140 MG: 20 INJECTION, SOLUTION INFILTRATION; PERINEURAL at 10:51

## 2019-06-21 ASSESSMENT — LIFESTYLE VARIABLES: SMOKING_STATUS: 1

## 2019-06-21 ASSESSMENT — COPD QUESTIONNAIRES: CAT_SEVERITY: MODERATE

## 2019-06-21 ASSESSMENT — PAIN - FUNCTIONAL ASSESSMENT: PAIN_FUNCTIONAL_ASSESSMENT: 0-10

## 2019-06-21 NOTE — OP NOTE
[facility]    CONSCIOUS SEDATION      Patient: Otf Cabezas  : 1960  Acct#: [de-identified]    PLANNED PROCEDURE   [x]EGD  [x]Colonoscopy []Flex Sigmoid  []Other:  Indication: Pain control for GI procedure    Consent: I have discussed with the patient and/or the patient representative the indication, alternatives, and the possible risks and/or complications of the planned procedure and the anesthesia methods. The patient and/or patient representative appear to understand and agree to proceed. Pre-Sedation Documentation and Exam: I have personally completed a history, physical exam & review of systems for this patient (see notes). Airway Assessment: mac    Prior History of Anesthesia Complications: mac    ASA Classification: mac    Sedation/ Anesthesia Plan: mac      SEDATION  Planned agent:[x]Midazolam []Meperidine [x]Sublimaze []Morphine    Monitoring and Safety: The patient will be placed on a cardiac monitor and vital signs, pulse oximetry and level of consciousness will be continuously evaluated throughout the procedure. The patient will be closely monitored until recovery from the medications is complete and the patient has returned to baseline status. Respiratory therapy will be on standby during the procedure. I have reviewed with the patient and/or family the risks, benefits, and alternatives to the procedure.     Mynor Gutierrez MD, CNSP  2019, 10:47 AM    Post-Sedation Vital Signs: Vital signs were reviewed and were stable after the procedure (see flow sheet for vitals)            Post-Sedation Exam: Lungs: clear           Complications: none     Mynor Gutierrez MD, CNSP  2019,

## 2019-06-21 NOTE — ANESTHESIA POSTPROCEDURE EVALUATION
Department of Anesthesiology  Postprocedure Note    Patient: Malika Hinton  MRN: [id number]  YOB: 1960  Date of evaluation: 6/21/2019  Time:  11:15 AM     Procedure Summary     Date:  06/21/19 Room / Location:  [address] ENDO 13 / [address] [facility]oscopy    Anesthesia Start:  1050 Anesthesia Stop:  1114    Procedures:       COLONOSCOPY DIAGNOSTIC (Left )      EGD BIOPSY (Left ) Diagnosis:  (BLOOD IN STOOL, PAIN,)    Surgeon:  Ro Hicks MD Responsible Provider:  John León MD    Anesthesia Type:  MAC ASA Status:  2          Anesthesia Type: MAC    Kervin Phase I: Kervin Score: 10    Kervin Phase II:      Last vitals: Reviewed and per EMR flowsheets.        Anesthesia Post Evaluation    Patient location during evaluation: bedside  Patient participation: complete - patient participated  Level of consciousness: awake and alert  Pain score: 0  Airway patency: patent  Nausea & Vomiting: no vomiting and no nausea  Complications: no  Cardiovascular status: hemodynamically stable  Respiratory status: acceptable  Hydration status: stable

## 2019-06-21 NOTE — BRIEF OP NOTE
Brief Postoperative Note  ______________________________________________________________    Patient: Cassia Padilla  YOB: 1960  MRN: [id number]  Date of Procedure: 6/21/2019    Pre-Op Diagnosis: BLOOD IN STOOL, PAIN,    Post-Op Diagnosis: Same       Procedure(s):  COLONOSCOPY DIAGNOSTIC  EGD BIOPSY    Anesthesia: Monitor Anesthesia Care    Surgeon(s):  Jack Alonzo MD    Assistant: yes    Estimated Blood Loss (mL): less than 50     Complications: None    Specimens:   ID Type Source Tests Collected by Time Destination   A : GASTRIC NODULES Tissue Stomach SURGICAL PATHOLOGY Jack Alonzo MD 6/21/2019 1057    B : DISTAL ESOPHAGUS/   R/O AGUIRRE'S Tissue Esophagus SURGICAL PATHOLOGY Jack Alonzo MD 6/21/2019 1059        Implants:  * No implants in log *      Drains: * No LDAs found *    Findings: egs gastric nodule, g 2 esophagitis, colonoscopy to cecum.     Jack Alonzo MD  Date: 6/21/2019  Time: 11:17 AM

## 2019-06-21 NOTE — OP NOTE
[address] [place] [address]                                OPERATIVE REPORT    PATIENT NAME: Da Lau                     :        1960  MED REC NO:   [id number]                           ROOM:  ACCOUNT NO:   [de-identified]                           ADMIT DATE: 2019  PROVIDER:     MARÍA Jordan OF PROCEDURE:  2019    PROCEDURES PERFORMED:  Esophagogastroduodenoscopy and colonoscopy. INDICATION:  The patient is a 59-year-old pleasant male who has nausea,  vomiting, left-sided abdominal pain, blood in the stool. He is  undergoing further evaluation. Please see my brief history for details  and for physical examination. ASA CLASSIFICATION:  As per Anesthesia. Please see Anesthesia note for  details. INSTRUMENT:  PCF-H190AL pediatric colonoscope. PHOTOGRAPHS:  Yes. BIOPSIES:  Yes. CONSENT:  Procedure indications and complications including but not  limited to perforation, bleeding, infection, adverse reaction to  medicine, very slight chance of missing significant lesions discussed  with the patient, and the patient expressed his understanding, and a  written consent was obtained. DESCRIPTION OF THE PROCEDURE:  The patient was placed in the left  lateral decubitus position in the endo room #13. The patient was placed  on appropriate monitoring of vitals including blood pressure, heart rate  and pulse ox. Oropharynx was sprayed with Cetacaine spray, and bite  block was placed between maxilla and mandible. After the adequate total  intravenous anesthesia was given by Anesthesia, the PCF-H190AL pediatric  colonoscope was inserted into the oropharynx and the esophagus was  intubated under direct vision. The scope was advanced down through the  stomach into the second portion of duodenum.   On careful inspection and  on withdrawal of the scope, the duodenum looks normal as shown in  picture #A3. In the antrum, the patient had gastric nodule noted as  shown in picture #A4 and #A5. Biopsies obtained. Body of the stomach  looks normal as shown in picture #A6. On retroflex, fundus looks normal  as shown in picture #A7. In the distal esophagus, the patient had  erythema edema extending beyond the Z-line consistent with grade 3  esophagitis noted. Biopsies obtained to rule out Altamirano's epithelium. Proximal esophagus looks normal as shown in picture  number A1. Air was withdrawn as the scope was removed. The patient tolerated the  procedure well without any immediate complications. Colonoscopy report:  The patient's position was changed after the rectal  examination, continued on total intravenous anesthesia. The PCF-H190AL  pediatric colonoscope was inserted into the rectum. On rectal  examination, the patient was found to have normal sphincter tone. No  masses noted. Prostate is normal.  Contour was noted. After the rectal  examination, the PCF-H190AL pediatric colonoscope was inserted into the  rectum and advanced up to the cecum without any difficulty. On careful inspection, the appendiceal orifice and cecum looks normal as  shown in picture number A1. The ascending colon, the patient had  slightly fair prep noted as shown in picture number A2. After multiple  washings, no significant lesions identified, A2 and A3. Transverse  colon looks normal as shown in picture  number A4. Descending colon  looks normal as shown in picture  number A5. Sigmoid colon looks normal  as shown in picture  numbers A6 and A7. On retroflex in the rectum,  moderate internal hemorrhoids noted as shown in picture  number A8. Air was withdrawn as the scope was removed. The patient tolerated the  procedure well without any immediate complications. Vitals including  blood pressure, heart rate, and pulse ox were stable during the  procedure and after the procedure.   The patient

## 2019-06-21 NOTE — PROGRESS NOTES
Photos taken, biopsies TAKEN WITH COLD FORCEPS, SPECIMENS LABELED & 2 JARS SENT TO LAB. EGD completed, pt tolerated well.

## 2019-06-21 NOTE — ANESTHESIA PRE PROCEDURE
Department of Anesthesiology  Preprocedure Note       Name:  Ann Gould   Age:  62 y.o.  :  1960                                          MRN:  [id number]         Date:  2019      Surgeon: Nai Dominguez):  Warren Hinds MD    Procedure: COLONOSCOPY DIAGNOSTIC (Left )  EGD ESOPHAGOGASTRODUODENOSCOPY (Left )    Medications prior to admission:   Prior to Admission medications    Medication Sig Start Date End Date Taking? Authorizing Provider   gabapentin (NEURONTIN) 300 MG capsule Take 1 capsule by mouth every evening 10/4/17  Yes Shimon Richardson DO   pantoprazole (PROTONIX) 40 MG tablet Take 1 tablet by mouth daily 16  Yes FRANK Velazquez - CNP   albuterol sulfate  (90 BASE) MCG/ACT inhaler 2 puffs every 4-6 hours with spacer for wheezing and chest congestionwhen necessary 16  Yes Sandy Ocampo MD   amitriptyline (ELAVIL) 25 MG tablet Take 25 mg by mouth nightly. Yes Historical Provider, MD   FLUoxetine (PROZAC) 20 MG capsule Take 20 mg by mouth daily. Yes Historical Provider, MD   metoprolol (TOPROL-XL) 50 MG XL tablet Take 50 mg by mouth daily. Yes Historical Provider, MD   acetaminophen (APAP EXTRA STRENGTH) 500 MG tablet Take 1 tablet by mouth every 6 hours as needed for Pain 18   Rancho Sánchez PA-C   tamsulosin [facility]) 0.4 MG capsule Take 1 capsule by mouth nightly 10/24/17   Susie Remy MD   GARLIC PO Take by mouth    Historical Provider, MD   methocarbamol (ROBAXIN) 500 MG tablet Take 1 tablet by mouth 3 times daily 10/4/17   Shimon Richardson DO   TESTOSTERONE AQUEOUS IM Inject 300 mg into the muscle.     Historical Provider, MD       Current medications:    Current Facility-Administered Medications   Medication Dose Route Frequency Provider Last Rate Last Dose    0.45 % sodium chloride infusion   Intravenous Continuous Warren Hinds MD 75 mL/hr at 19 0922         Allergies:  No Known Allergies    Problem List:    Patient Active Problem List Diagnosis Code    Closed right calcaneal fracture S92.001A       Past Medical History:        Diagnosis Date    Bipolar 1 disorder ([facility]     [facility]     Hypertension        Past Surgical History:        Procedure Laterality Date    FOOT SURGERY Right     TONSILLECTOMY         Social History:    Social History     Tobacco Use    Smoking status: Current Every Day Smoker     Packs/day: 0.25     Years: 30.00     Pack years: 7.50     Types: Cigarettes     Start date: 4/27/1983    Smokeless tobacco: Never Used   Substance Use Topics    Alcohol use: Yes     Comment: occasionally                                Ready to quit: Not Answered  Counseling given: Not Answered      Vital Signs (Current):   Vitals:    06/21/19 0946   BP: 114/80   Pulse: 109   Resp: 20   Temp: 36.8 °C (98.3 °F)   TempSrc: Temporal   SpO2: 94%   Weight: 191 lb 3.2 oz (86.7 kg)   Height: 6' 2\" (1.88 m)                                              BP Readings from Last 3 Encounters:   06/21/19 114/80   03/24/19 (!) 125/92   01/07/19 126/82       NPO Status: Time of last liquid consumption: 0800(cup of water)                        Time of last solid consumption: 1700                        Date of last liquid consumption: 06/21/19                        Date of last solid food consumption: 06/20/19    BMI:   Wt Readings from Last 3 Encounters:   06/21/19 191 lb 3.2 oz (86.7 kg)   01/07/19 192 lb (87.1 kg)   12/17/18 198 lb (89.8 kg)     Body mass index is 24.55 kg/m².     CBC:   Lab Results   Component Value Date    WBC 12.2 11/18/2018    RBC 4.48 11/18/2018    HGB 13.6 11/18/2018    HCT 39.7 11/18/2018    MCV 88.6 11/18/2018    RDW 14.2 11/09/2017     11/18/2018       CMP:   Lab Results   Component Value Date     11/18/2018    K 4.1 11/18/2018     11/18/2018    CO2 22 11/18/2018    BUN 14 11/18/2018    CREATININE 1.1 11/18/2018    LABGLOM 83 11/18/2018    GLUCOSE 123 11/18/2018

## 2019-06-21 NOTE — PROGRESS NOTES
1115 Pt to ENDO PACU very drowsy, slightly arouses to name. Quickly falls back to sleep. 1125 pt remains asleep. 1130 pt awake and taking sips crnberry juice  1133 Dr Thong Reina at bedside speaking to patient. 1145 IV removed  1150 pt dressed per self, tolerated well. 1155 Discharge instructions given.     1256 pt to bathroom , tolerated well  1158 To front of building [facility] express Transport via wheelchair

## 2019-06-28 ENCOUNTER — HOSPITAL ENCOUNTER (EMERGENCY)
Age: 59
Discharge: HOME | End: 2019-06-28
Payer: MEDICARE

## 2019-06-28 VITALS
RESPIRATION RATE: 17 BRPM | OXYGEN SATURATION: 94 % | DIASTOLIC BLOOD PRESSURE: 79 MMHG | SYSTOLIC BLOOD PRESSURE: 131 MMHG | TEMPERATURE: 97.9 F | HEIGHT: 74 IN | HEART RATE: 89 BPM | BODY MASS INDEX: 24.51 KG/M2 | WEIGHT: 191 LBS

## 2019-06-28 DIAGNOSIS — M54.50 ACUTE EXACERBATION OF CHRONIC LOW BACK PAIN: Primary | ICD-10-CM

## 2019-06-28 DIAGNOSIS — G89.29 ACUTE EXACERBATION OF CHRONIC LOW BACK PAIN: Primary | ICD-10-CM

## 2019-06-28 PROCEDURE — 96372 THER/PROPH/DIAG INJ SC/IM: CPT

## 2019-06-28 PROCEDURE — 99282 EMERGENCY DEPT VISIT SF MDM: CPT

## 2019-06-28 PROCEDURE — 6360000002 HC RX W HCPCS: Performed by: NURSE PRACTITIONER

## 2019-06-28 RX ORDER — METHYLPREDNISOLONE SODIUM SUCCINATE 125 MG/2ML
80 INJECTION, POWDER, LYOPHILIZED, FOR SOLUTION INTRAMUSCULAR; INTRAVENOUS ONCE
Status: COMPLETED | OUTPATIENT
Start: 2019-06-28 | End: 2019-06-28

## 2019-06-28 RX ORDER — PREDNISONE 50 MG/1
50 TABLET ORAL DAILY
Qty: 5 TABLET | Refills: 0 | Status: SHIPPED | OUTPATIENT
Start: 2019-06-28 | End: 2019-07-03

## 2019-06-28 RX ORDER — KETOROLAC TROMETHAMINE 30 MG/ML
30 INJECTION, SOLUTION INTRAMUSCULAR; INTRAVENOUS ONCE
Status: COMPLETED | OUTPATIENT
Start: 2019-06-28 | End: 2019-06-28

## 2019-06-28 RX ADMIN — METHYLPREDNISOLONE SODIUM SUCCINATE 80 MG: 125 INJECTION, POWDER, FOR SOLUTION INTRAMUSCULAR; INTRAVENOUS at 21:19

## 2019-06-28 RX ADMIN — KETOROLAC TROMETHAMINE 30 MG: 30 INJECTION, SOLUTION INTRAMUSCULAR at 21:18

## 2019-06-28 ASSESSMENT — ENCOUNTER SYMPTOMS
NAUSEA: 0
RHINORRHEA: 0
EYE DISCHARGE: 0
WHEEZING: 0
BACK PAIN: 1
SORE THROAT: 0
SHORTNESS OF BREATH: 0
VOMITING: 0
ABDOMINAL PAIN: 0
EYE REDNESS: 0
DIARRHEA: 0
COUGH: 0

## 2019-06-28 ASSESSMENT — PAIN DESCRIPTION - ORIENTATION: ORIENTATION: LEFT

## 2019-06-28 ASSESSMENT — PAIN DESCRIPTION - PAIN TYPE: TYPE: CHRONIC PAIN

## 2019-06-28 ASSESSMENT — PAIN DESCRIPTION - LOCATION: LOCATION: BACK

## 2019-06-28 ASSESSMENT — PAIN SCALES - GENERAL: PAINLEVEL_OUTOF10: 7

## 2019-06-29 NOTE — ED TRIAGE NOTES
Pt arrived to ED with c/o back pain and \"needing a nerve shot. \"  Pt reporting last shot was in November. Pt ambulated to room, with even gait. Monitor applied and vitals taken.

## 2019-06-29 NOTE — ED PROVIDER NOTES
[facility]  eMERGENCY dEPARTMENT eNCOUnter          CHIEF COMPLAINT       Chief Complaint   Patient presents with    Back Pain       Nurses Notes reviewed and I agree except as noted in the HPI. HISTORY OF PRESENT ILLNESS    Hunter Jalloh is a 62 y.o. male who presents to the Emergency Department for the evaluation of back pain. The patient reports chronic back pain. He reports he is due to receive an epidural injection at the [facility] next month. He reports worsening left lumbar pain. He reports the pain radiates down his left leg. He rates the pain as 7/10. He reports taking tylenol with little relief. The patient has no further symptoms or complaints at initial encounter. The HPI was provided by the patient. REVIEW OF SYSTEMS     Review of Systems   Constitutional: Negative for appetite change, chills, fatigue and fever. HENT: Negative for congestion, ear pain, rhinorrhea and sore throat. Eyes: Negative for discharge, redness and visual disturbance. Respiratory: Negative for cough, shortness of breath and wheezing. Cardiovascular: Negative for chest pain, palpitations and leg swelling. Gastrointestinal: Negative for abdominal pain, diarrhea, nausea and vomiting. Genitourinary: Negative for decreased urine volume, difficulty urinating, dysuria and hematuria. Musculoskeletal: Positive for back pain. Negative for arthralgias, joint swelling and neck pain. Skin: Negative for pallor and rash. Allergic/Immunologic: Negative for environmental allergies. Neurological: Negative for dizziness, syncope, weakness, light-headedness, numbness and headaches. Hematological: Negative for adenopathy. Psychiatric/Behavioral: Negative for confusion and suicidal ideas. The patient is not nervous/anxious. PAST MEDICAL HISTORY    has a past medical history of Bipolar 1 disorder ([address] COPD (chronic obstructive pulmonary disease) ([facility] and Hypertension.     SURGICAL HISTORY has a past surgical history that includes Foot surgery (Right); Tonsillectomy; Colonoscopy (Left, 2019); and Upper gastrointestinal endoscopy (Left, 2019). CURRENT MEDICATIONS       Previous Medications    ACETAMINOPHEN (APAP EXTRA STRENGTH) 500 MG TABLET    Take 1 tablet by mouth every 6 hours as needed for Pain    ALBUTEROL SULFATE  (90 BASE) MCG/ACT INHALER    2 puffs every 4-6 hours with spacer for wheezing and chest congestionwhen necessary    AMITRIPTYLINE (ELAVIL) 25 MG TABLET    Take 25 mg by mouth nightly. FLUOXETINE (PROZAC) 20 MG CAPSULE    Take 20 mg by mouth daily. GABAPENTIN (NEURONTIN) 300 MG CAPSULE    Take 1 capsule by mouth every evening    GARLIC PO    Take by mouth    METHOCARBAMOL (ROBAXIN) 500 MG TABLET    Take 1 tablet by mouth 3 times daily    METOPROLOL (TOPROL-XL) 50 MG XL TABLET    Take 50 mg by mouth daily. PANTOPRAZOLE (PROTONIX) 40 MG TABLET    Take 1 tablet by mouth daily    TAMSULOSIN (FLOMAX) 0.4 MG CAPSULE    Take 1 capsule by mouth nightly    TESTOSTERONE AQUEOUS IM    Inject 300 mg into the muscle. ALLERGIES     has No Known Allergies. FAMILY HISTORY     indicated that his mother is alive. He indicated that his father is . family history includes Diabetes in his father and mother. SOCIAL HISTORY      reports that he has been smoking cigarettes. He started smoking about 36 years ago. He has a 7.50 pack-year smoking history. He has never used smokeless tobacco. He reports that he drinks alcohol. He reports that he does not use drugs. PHYSICAL EXAM     INITIAL VITALS:  height is 6' 2\" (1.88 m) and weight is 191 lb (86.6 kg). His oral temperature is 97.9 °F (36.6 °C). His blood pressure is 131/79 and his pulse is 89. His respiration is 17 and oxygen saturation is 94%. Physical Exam   Constitutional: He is oriented to person, place, and time. He appears well-developed and well-nourished.    HENT:   Head: Normocephalic and atraumatic. Right Ear: External ear normal.   Left Ear: External ear normal.   Eyes: Conjunctivae are normal. Right eye exhibits no discharge. Left eye exhibits no discharge. No scleral icterus. Neck: Normal range of motion. Neck supple. No JVD present. Pulmonary/Chest: Effort normal. No stridor. No respiratory distress. Abdominal: Soft. He exhibits no distension. Musculoskeletal: Normal range of motion. He exhibits no edema. Lumbar back: He exhibits tenderness (left paraspinal). He exhibits normal range of motion, no bony tenderness, no swelling, no edema, no deformity, no laceration, no pain and no spasm. Neurological: He is alert and oriented to person, place, and time. He exhibits normal muscle tone. GCS eye subscore is 4. GCS verbal subscore is 5. GCS motor subscore is 6. Skin: Skin is warm and dry. He is not diaphoretic. No erythema. Psychiatric: He has a normal mood and affect. His behavior is normal.   Nursing note and vitals reviewed. DIFFERENTIAL DIAGNOSIS:   Including but not limited to: chronic pain, strain, sciatica     DIAGNOSTIC RESULTS     EKG: All EKG's are interpreted by the Emergency Department Physician who either signs or Co-signs this chart in theabsence of a cardiologist.    None    RADIOLOGY:non-plain film images(s) such as CT, Ultrasound and MRI are read by the radiologist.    No orders to display       LABS:     Labs Reviewed - No data to display    EMERGENCY DEPARTMENT COURSE:   Vitals:    Vitals:    06/28/19 2053   BP: 131/79   Pulse: 89   Resp: 17   Temp: 97.9 °F (36.6 °C)   TempSrc: Oral   SpO2: 94%   Weight: 191 lb (86.6 kg)   Height: 6' 2\" (1.88 m)       9:15 PM: The patient was seen and evaluated. MDM:  The patient was seen and evaluated by me at bedside for back pain. The patient arrived in no acute distress and in stable condition. Within the department, I observed the patient's vital signs. On exam, I appreciated left paraspinal lumbar tenderness. The patient was treated with Solu- Medrol and Toradol in the ED. I explained my proposed course of treatment to the patient, who was amenable to my decision, and I answered all questions. The patient was discharged home with prescriptions for Prednisone. The patient is to follow up with PCP in 2 days as discussed. The patient is instructed to return to the emergency department for any worsening or otherwise change in their symptoms. CRITICAL CARE:   None     CONSULTS:  none    PROCEDURES:  None    FINAL IMPRESSION      1. Acute exacerbation of chronic low back pain          DISPOSITION/PLAN   Discharge     PATIENT REFERRED TO:  MD Sridhar Garcia [place] [address]  [phone]    In 2 days        DISCHARGE MEDICATIONS:  New Prescriptions    PREDNISONE (DELTASONE) 50 MG TABLET    Take 1 tablet by mouth daily for 5 days       (Please note that portions of this note were completed with a voice recognition program.  Effortswere made to edit the dictations but occasionally words are mis-transcribed.)    The patient was given an opportunity to see the Emergency Attending. The patient voiced understanding that I was a Mid-Level Provider and was in agreement with being seen independently by myself. Scribe:  Patrizia Cat 6/28/19 9:15 PM Scribing for and in the presence of KT Pettit. Signed by: Sis Foy, 06/28/19 9:33 PM    Provider:  I personally performed the services described in the documentation, reviewed and edited the documentation which was dictated to the scribe in my presence, and it accurately records my words and actions.     Ángela Stevens CNP 6/28/19 9:33 PM        FRANK Infante - CNP  06/29/19 0154

## 2019-07-20 ENCOUNTER — HOSPITAL ENCOUNTER (EMERGENCY)
Age: 59
Discharge: HOME | End: 2019-07-20
Attending: EMERGENCY MEDICINE
Payer: OTHER GOVERNMENT

## 2019-07-20 VITALS
HEART RATE: 80 BPM | DIASTOLIC BLOOD PRESSURE: 70 MMHG | BODY MASS INDEX: 26.51 KG/M2 | TEMPERATURE: 98.4 F | SYSTOLIC BLOOD PRESSURE: 123 MMHG | WEIGHT: 200 LBS | OXYGEN SATURATION: 97 % | RESPIRATION RATE: 18 BRPM | HEIGHT: 73 IN

## 2019-07-20 DIAGNOSIS — M54.16 LUMBAR RADICULOPATHY: Primary | ICD-10-CM

## 2019-07-20 PROCEDURE — 6360000002 HC RX W HCPCS: Performed by: EMERGENCY MEDICINE

## 2019-07-20 PROCEDURE — 99283 EMERGENCY DEPT VISIT LOW MDM: CPT

## 2019-07-20 PROCEDURE — 96372 THER/PROPH/DIAG INJ SC/IM: CPT

## 2019-07-20 RX ORDER — METHYLPREDNISOLONE ACETATE 80 MG/ML
80 INJECTION, SUSPENSION INTRA-ARTICULAR; INTRALESIONAL; INTRAMUSCULAR; SOFT TISSUE ONCE
Status: COMPLETED | OUTPATIENT
Start: 2019-07-20 | End: 2019-07-20

## 2019-07-20 RX ORDER — METHYLPREDNISOLONE 4 MG/1
TABLET ORAL
Qty: 1 KIT | Refills: 0 | Status: SHIPPED | OUTPATIENT
Start: 2019-07-20 | End: 2019-07-26

## 2019-07-20 RX ORDER — CYCLOBENZAPRINE HCL 10 MG
10 TABLET ORAL NIGHTLY PRN
Qty: 30 TABLET | Refills: 0 | Status: SHIPPED | OUTPATIENT
Start: 2019-07-20 | End: 2019-07-30

## 2019-07-20 RX ORDER — KETOROLAC TROMETHAMINE 30 MG/ML
30 INJECTION, SOLUTION INTRAMUSCULAR; INTRAVENOUS ONCE
Status: COMPLETED | OUTPATIENT
Start: 2019-07-20 | End: 2019-07-20

## 2019-07-20 RX ADMIN — KETOROLAC TROMETHAMINE 30 MG: 30 INJECTION, SOLUTION INTRAMUSCULAR at 05:41

## 2019-07-20 RX ADMIN — METHYLPREDNISOLONE ACETATE 80 MG: 80 INJECTION, SUSPENSION INTRA-ARTICULAR; INTRALESIONAL; INTRAMUSCULAR; SOFT TISSUE at 05:42

## 2019-07-20 ASSESSMENT — PAIN DESCRIPTION - ORIENTATION: ORIENTATION: LEFT

## 2019-07-20 ASSESSMENT — ENCOUNTER SYMPTOMS
VOMITING: 0
ABDOMINAL PAIN: 0
BACK PAIN: 1
EYE DISCHARGE: 0
EYE ITCHING: 0
ABDOMINAL DISTENTION: 0
SHORTNESS OF BREATH: 0
NAUSEA: 0
DIARRHEA: 0
WHEEZING: 0
COUGH: 0
RHINORRHEA: 0

## 2019-07-20 ASSESSMENT — PAIN SCALES - GENERAL
PAINLEVEL_OUTOF10: 6
PAINLEVEL_OUTOF10: 6

## 2019-07-20 ASSESSMENT — PAIN DESCRIPTION - LOCATION: LOCATION: BACK;LEG

## 2019-07-20 ASSESSMENT — PAIN DESCRIPTION - PAIN TYPE: TYPE: ACUTE PAIN

## 2019-07-20 NOTE — ED PROVIDER NOTES
[facility]  eMERGENCY dEPARTMENT eNCOUnter          [address] [facility]       Chief Complaint   Patient presents with    Back Pain    Leg Pain       Nurses Notes reviewed and I agreeexcept as noted in the HPI. HISTORY OF PRESENT ILLNESS    iKp Oh is a 62 y.o. male who presents to Emergency Department with pain to left lower back radiating to LLE. He has history of lumbar radiculopathy, he had epidural injection 2 weeks ago at [facility]. His pain is getting worse in the last 48 hours. Pain is moderate, radiating to left lower leg below the knee. He is pain is worse when bending forward and when he stands on toes. No change of BM and bladder functions. No recent back injury. REVIEW OF SYSTEMS     Review of Systems   Constitutional: Negative for activity change, appetite change, chills, fatigue and fever. HENT: Negative for congestion, ear discharge, hearing loss, nosebleeds and rhinorrhea. Eyes: Negative for discharge and itching. Respiratory: Negative for cough, shortness of breath and wheezing. Cardiovascular: Negative for chest pain. Gastrointestinal: Negative for abdominal distention, abdominal pain, diarrhea, nausea and vomiting. Endocrine: Negative for cold intolerance. Musculoskeletal: Positive for back pain. Negative for arthralgias, myalgias, neck pain and neck stiffness. Skin: Negative for rash and wound. Neurological: Negative for dizziness and weakness. Psychiatric/Behavioral: Negative for agitation and suicidal ideas. PAST MEDICAL HISTORY    has a past medical history of Bipolar 1 disorder ([facility] COPD (chronic obstructive pulmonary disease) ([facility] and Hypertension. SURGICAL HISTORY      has a past surgical history that includes Foot surgery (Right); Tonsillectomy; Colonoscopy (Left, 6/21/2019); and Upper gastrointestinal endoscopy (Left, 6/21/2019).     CURRENT MEDICATIONS       Discharge Medication List as of 7/20/2019  5:35 AM

## 2019-07-21 ENCOUNTER — HOSPITAL ENCOUNTER (EMERGENCY)
Age: 59
Discharge: HOME | End: 2019-07-21
Attending: EMERGENCY MEDICINE
Payer: MEDICARE

## 2019-07-21 VITALS
SYSTOLIC BLOOD PRESSURE: 120 MMHG | HEIGHT: 73 IN | HEART RATE: 83 BPM | BODY MASS INDEX: 26.51 KG/M2 | DIASTOLIC BLOOD PRESSURE: 75 MMHG | RESPIRATION RATE: 18 BRPM | WEIGHT: 200 LBS | OXYGEN SATURATION: 99 % | TEMPERATURE: 97.9 F

## 2019-07-21 DIAGNOSIS — M54.16 LUMBAR RADICULOPATHY: Primary | ICD-10-CM

## 2019-07-21 PROCEDURE — 6360000002 HC RX W HCPCS: Performed by: EMERGENCY MEDICINE

## 2019-07-21 PROCEDURE — 6370000000 HC RX 637 (ALT 250 FOR IP): Performed by: EMERGENCY MEDICINE

## 2019-07-21 PROCEDURE — 99283 EMERGENCY DEPT VISIT LOW MDM: CPT

## 2019-07-21 PROCEDURE — 96372 THER/PROPH/DIAG INJ SC/IM: CPT

## 2019-07-21 RX ORDER — KETOROLAC TROMETHAMINE 30 MG/ML
30 INJECTION, SOLUTION INTRAMUSCULAR; INTRAVENOUS ONCE
Status: COMPLETED | OUTPATIENT
Start: 2019-07-21 | End: 2019-07-21

## 2019-07-21 RX ORDER — HYDROCODONE BITARTRATE AND ACETAMINOPHEN 5; 325 MG/1; MG/1
1 TABLET ORAL ONCE
Status: COMPLETED | OUTPATIENT
Start: 2019-07-21 | End: 2019-07-21

## 2019-07-21 RX ORDER — METHYLPREDNISOLONE ACETATE 80 MG/ML
80 INJECTION, SUSPENSION INTRA-ARTICULAR; INTRALESIONAL; INTRAMUSCULAR; SOFT TISSUE ONCE
Status: COMPLETED | OUTPATIENT
Start: 2019-07-21 | End: 2019-07-21

## 2019-07-21 RX ADMIN — METHYLPREDNISOLONE ACETATE 80 MG: 80 INJECTION, SUSPENSION INTRA-ARTICULAR; INTRALESIONAL; INTRAMUSCULAR; SOFT TISSUE at 03:07

## 2019-07-21 RX ADMIN — HYDROCODONE BITARTRATE AND ACETAMINOPHEN 1 TABLET: 5; 325 TABLET ORAL at 03:06

## 2019-07-21 RX ADMIN — KETOROLAC TROMETHAMINE 30 MG: 30 INJECTION, SOLUTION INTRAMUSCULAR at 03:06

## 2019-07-21 ASSESSMENT — ENCOUNTER SYMPTOMS
COUGH: 0
EYE ITCHING: 0
ABDOMINAL PAIN: 0
SHORTNESS OF BREATH: 0
ABDOMINAL DISTENTION: 0
NAUSEA: 0
RHINORRHEA: 0
DIARRHEA: 0
EYE DISCHARGE: 0
WHEEZING: 0
BACK PAIN: 1
VOMITING: 0

## 2019-07-21 ASSESSMENT — PAIN SCALES - GENERAL: PAINLEVEL_OUTOF10: 8

## 2019-07-21 ASSESSMENT — PAIN DESCRIPTION - ORIENTATION: ORIENTATION: LEFT

## 2019-07-21 ASSESSMENT — PAIN DESCRIPTION - PAIN TYPE: TYPE: CHRONIC PAIN

## 2019-07-21 ASSESSMENT — PAIN DESCRIPTION - DESCRIPTORS: DESCRIPTORS: ACHING;DISCOMFORT

## 2019-07-21 ASSESSMENT — PAIN DESCRIPTION - LOCATION: LOCATION: BACK

## 2019-07-21 ASSESSMENT — PAIN DESCRIPTION - FREQUENCY: FREQUENCY: CONTINUOUS

## 2019-07-21 NOTE — ED PROVIDER NOTES
6' 1\" (1.854 m)       02:30    Patient is seen and evaluated in a timely fashion. MedicalDecision Making    He has known back pain and disc herniation. No recent back injury. No fever or chills. No lumbar tenderness. No red flags suggestive of epidural abscess. He is medicated with Norco, Toradol and Depo-medrol. Discharged with Medrol Dose pack and Flexeril. [address] [facility] physician follow up in one week. CRITICAL CARE:   None    CONSULTS:  None    PROCEDURES:  None    FINAL IMPRESSION      1.  Lumbar radiculopathy          DISPOSITION/PLAN   home    PATIENT REFERRED TO:  MD Sridhar Bruno [phone]    In 1 week        DISCHARGE MEDICATIONS:  New Prescriptions    No medications on file       (Please note that portions of this note were completed with a voice recognition program.  Efforts were made to edit the dictations but occasionally words aremis-transcribed.)    MD Rohit Finch MD  07/21/19 3564

## 2019-08-09 ENCOUNTER — HOSPITAL ENCOUNTER (EMERGENCY)
Age: 59
Discharge: HOME | End: 2019-08-09
Payer: MEDICARE

## 2019-08-09 VITALS
DIASTOLIC BLOOD PRESSURE: 80 MMHG | TEMPERATURE: 97.8 F | RESPIRATION RATE: 17 BRPM | SYSTOLIC BLOOD PRESSURE: 139 MMHG | HEART RATE: 75 BPM

## 2019-08-09 DIAGNOSIS — G89.29 ACUTE EXACERBATION OF CHRONIC LOW BACK PAIN: Primary | ICD-10-CM

## 2019-08-09 DIAGNOSIS — M54.50 ACUTE EXACERBATION OF CHRONIC LOW BACK PAIN: Primary | ICD-10-CM

## 2019-08-09 PROCEDURE — 99282 EMERGENCY DEPT VISIT SF MDM: CPT

## 2019-08-09 PROCEDURE — 6370000000 HC RX 637 (ALT 250 FOR IP): Performed by: NURSE PRACTITIONER

## 2019-08-09 PROCEDURE — 6360000002 HC RX W HCPCS: Performed by: NURSE PRACTITIONER

## 2019-08-09 PROCEDURE — 96372 THER/PROPH/DIAG INJ SC/IM: CPT

## 2019-08-09 RX ORDER — KETOROLAC TROMETHAMINE 30 MG/ML
30 INJECTION, SOLUTION INTRAMUSCULAR; INTRAVENOUS ONCE
Status: COMPLETED | OUTPATIENT
Start: 2019-08-09 | End: 2019-08-09

## 2019-08-09 RX ORDER — HYDROCODONE BITARTRATE AND ACETAMINOPHEN 5; 325 MG/1; MG/1
1 TABLET ORAL ONCE
Status: COMPLETED | OUTPATIENT
Start: 2019-08-09 | End: 2019-08-09

## 2019-08-09 RX ORDER — METHYLPREDNISOLONE ACETATE 80 MG/ML
80 INJECTION, SUSPENSION INTRA-ARTICULAR; INTRALESIONAL; INTRAMUSCULAR; SOFT TISSUE ONCE
Status: COMPLETED | OUTPATIENT
Start: 2019-08-09 | End: 2019-08-09

## 2019-08-09 RX ADMIN — HYDROCODONE BITARTRATE AND ACETAMINOPHEN 1 TABLET: 5; 325 TABLET ORAL at 01:26

## 2019-08-09 RX ADMIN — METHYLPREDNISOLONE ACETATE 80 MG: 80 INJECTION, SUSPENSION INTRA-ARTICULAR; INTRALESIONAL; INTRAMUSCULAR; SOFT TISSUE at 01:26

## 2019-08-09 RX ADMIN — KETOROLAC TROMETHAMINE 30 MG: 30 INJECTION, SOLUTION INTRAMUSCULAR at 01:26

## 2019-08-09 ASSESSMENT — ENCOUNTER SYMPTOMS
CHEST TIGHTNESS: 0
SORE THROAT: 0
TROUBLE SWALLOWING: 0
ABDOMINAL PAIN: 0
BACK PAIN: 1
SHORTNESS OF BREATH: 0
RHINORRHEA: 0
WHEEZING: 0
COUGH: 0

## 2019-08-09 ASSESSMENT — PAIN SCALES - GENERAL: PAINLEVEL_OUTOF10: 8

## 2019-08-09 NOTE — ED PROVIDER NOTES
are interpreted by the Emergency Department Physician who either signs orCo-signs this chart in the absence of a cardiologist.  None     RADIOLOGY: non-plain film images(s) such as CT, Ultrasound and MRI are read by the radiologist.  Plain radiographic images are visualized and preliminarily interpreted by the emergency physician unless otherwise statedbelow. No orders to display       LABS:   Labs Reviewed - No data to display    EMERGENCY DEPARTMENT COURSE:   Vitals:    Vitals:    08/09/19 0056 08/09/19 0057   BP:  139/80   Pulse:  75   Resp: 17    Temp:  97.8 °F (36.6 °C)     0113 Norco, Depo-medrol, and Toradol given    MDM    The patient presents here today specifically requesting to have a steroid/pain shot for this chronic back pain. He has established care at the [facility] and pain management for this, but does not go back for another month. Vital signs are stable. I appreciated a normal physical exam other than patient's subjective back pain. Patient was treated with Norco, Depo-medrol, and Toradol with improvement of his pain. He was discharged home in stable condition. Medications   HYDROcodone-acetaminophen (NORCO) 5-325 MG per tablet 1 tablet (1 tablet Oral Given 8/9/19 0126)   methylPREDNISolone acetate (DEPO-MEDROL) injection 80 mg (80 mg Intramuscular Given 8/9/19 0126)   ketorolac (TORADOL) injection 30 mg (30 mg Intramuscular Given 8/9/19 0126)       Patient was seen independently by myself. The patient's final impression and disposition and plan was determined bymyself. CRITICAL CARE:   None    CONSULTS:  None    PROCEDURES:  None    FINAL IMPRESSION      1. Acute exacerbation of chronic low back pain          DISPOSITION/PLAN   Discharge     PATIENT REFERRED TO:  No follow-up provider specified.     DISCHARGE MEDICATIONS:  Discharge Medication List as of 8/9/2019  2:26 AM          (Please note that portions ofthis note were completed with a voice recognition program.  Efforts were made

## 2019-08-11 ENCOUNTER — HOSPITAL ENCOUNTER (EMERGENCY)
Age: 59
Discharge: HOME | End: 2019-08-11
Payer: MEDICARE

## 2019-08-11 VITALS
RESPIRATION RATE: 16 BRPM | DIASTOLIC BLOOD PRESSURE: 80 MMHG | SYSTOLIC BLOOD PRESSURE: 127 MMHG | TEMPERATURE: 97.7 F | HEART RATE: 80 BPM | BODY MASS INDEX: 25.99 KG/M2 | WEIGHT: 197 LBS | OXYGEN SATURATION: 98 %

## 2019-08-11 DIAGNOSIS — M54.50 ACUTE EXACERBATION OF CHRONIC LOW BACK PAIN: Primary | ICD-10-CM

## 2019-08-11 DIAGNOSIS — G89.29 ACUTE EXACERBATION OF CHRONIC LOW BACK PAIN: Primary | ICD-10-CM

## 2019-08-11 PROCEDURE — 6370000000 HC RX 637 (ALT 250 FOR IP): Performed by: EMERGENCY MEDICINE

## 2019-08-11 PROCEDURE — 96372 THER/PROPH/DIAG INJ SC/IM: CPT

## 2019-08-11 PROCEDURE — 99282 EMERGENCY DEPT VISIT SF MDM: CPT

## 2019-08-11 PROCEDURE — 6360000002 HC RX W HCPCS: Performed by: EMERGENCY MEDICINE

## 2019-08-11 RX ORDER — CYCLOBENZAPRINE HCL 10 MG
10 TABLET ORAL ONCE
Status: COMPLETED | OUTPATIENT
Start: 2019-08-11 | End: 2019-08-11

## 2019-08-11 RX ORDER — CYCLOBENZAPRINE HCL 5 MG
5 TABLET ORAL 3 TIMES DAILY PRN
Qty: 30 TABLET | Refills: 0 | Status: SHIPPED | OUTPATIENT
Start: 2019-08-11 | End: 2019-08-21

## 2019-08-11 RX ORDER — IBUPROFEN 600 MG/1
600 TABLET ORAL EVERY 6 HOURS PRN
Qty: 30 TABLET | Refills: 0 | Status: SHIPPED | OUTPATIENT
Start: 2019-08-11 | End: 2019-08-14

## 2019-08-11 RX ORDER — KETOROLAC TROMETHAMINE 30 MG/ML
30 INJECTION, SOLUTION INTRAMUSCULAR; INTRAVENOUS ONCE
Status: COMPLETED | OUTPATIENT
Start: 2019-08-11 | End: 2019-08-11

## 2019-08-11 RX ADMIN — CYCLOBENZAPRINE HYDROCHLORIDE 10 MG: 10 TABLET, FILM COATED ORAL at 07:28

## 2019-08-11 RX ADMIN — KETOROLAC TROMETHAMINE 30 MG: 30 INJECTION, SOLUTION INTRAMUSCULAR at 07:28

## 2019-08-11 ASSESSMENT — ENCOUNTER SYMPTOMS: BACK PAIN: 1

## 2019-08-11 ASSESSMENT — PAIN DESCRIPTION - ORIENTATION: ORIENTATION: RIGHT

## 2019-08-11 ASSESSMENT — PAIN SCALES - GENERAL
PAINLEVEL_OUTOF10: 8
PAINLEVEL_OUTOF10: 8

## 2019-08-11 ASSESSMENT — PAIN DESCRIPTION - LOCATION: LOCATION: BACK

## 2019-08-11 NOTE — ED PROVIDER NOTES
DIFFERENTIAL DIAGNOSIS:   Acute exacerbation of chronic back pain, radiculopathy, do not suspect cauda equina syndrome or epidural abscess    DIAGNOSTIC RESULTS     EKG: All EKG's are interpreted by the Emergency Department Physician who either signs or Co-signs this chart in the absence of a cardiologist.    NOne    RADIOLOGY: non-plainfilm images(s) such as CT, Ultrasound and MRI are read by the radiologist.    No orders to display       LABS:     Labs Reviewed - No data to display    EMERGENCY DEPARTMENT COURSE:   Vitals:    Vitals:    08/11/19 0331   BP: 127/80   Pulse: 80   Resp: 16   Temp: 97.7 °F (36.5 °C)   TempSrc: Oral   SpO2: 98%   Weight: 197 lb (89.4 kg)       7:30 AM: The patient was seen and evaluated. Patient is ordered appropriate pain medications. He is advised to follow-up with pain management    MDM:  She will be discharged with prescriptions for Flexeril and ibuprofen to try to limit frequent ED visits. He is advised to follow-up with pain management and or [place] clinic. CRITICAL CARE:   None    CONSULTS:  None    PROCEDURES:  None    FINAL IMPRESSION      1. Acute exacerbation of chronic low back pain          DISPOSITION/PLAN   Discharge    PATIENT REFERRED TO:  Ricarda Allan MD  [facility] [address] [phone]    Schedule an appointment as soon as possible for a visit         DISCHARGE MEDICATIONS:  New Prescriptions    CYCLOBENZAPRINE (FLEXERIL) 5 MG TABLET    Take 1 tablet by mouth 3 times daily as needed for Muscle spasms    IBUPROFEN (ADVIL;MOTRIN) 600 MG TABLET    Take 1 tablet by mouth every 6 hours as needed for Pain       (Please note that portions of this note were completed with a voice recognition program.  Efforts were made to edit the dictations but occasionally words are mis-transcribed.)    The patient was given an opportunity to see the Emergency Attending.  The patient voiced understanding that I was a Mid-LevelProvider and was in agreement with being seen independently by myself.           Misael Akhtar, FRANK - CNP  08/11/19 0698

## 2019-08-12 ENCOUNTER — HOSPITAL ENCOUNTER (EMERGENCY)
Age: 59
Discharge: HOME | End: 2019-08-12
Attending: EMERGENCY MEDICINE
Payer: MEDICARE

## 2019-08-12 VITALS
BODY MASS INDEX: 26.25 KG/M2 | DIASTOLIC BLOOD PRESSURE: 86 MMHG | HEART RATE: 88 BPM | SYSTOLIC BLOOD PRESSURE: 140 MMHG | OXYGEN SATURATION: 100 % | RESPIRATION RATE: 16 BRPM | TEMPERATURE: 98.8 F | WEIGHT: 199 LBS

## 2019-08-12 DIAGNOSIS — G89.29 ACUTE EXACERBATION OF CHRONIC LOW BACK PAIN: Primary | ICD-10-CM

## 2019-08-12 DIAGNOSIS — M54.50 ACUTE EXACERBATION OF CHRONIC LOW BACK PAIN: Primary | ICD-10-CM

## 2019-08-12 PROCEDURE — 96372 THER/PROPH/DIAG INJ SC/IM: CPT

## 2019-08-12 PROCEDURE — 2709999900 HC NON-CHARGEABLE SUPPLY

## 2019-08-12 PROCEDURE — 6360000002 HC RX W HCPCS: Performed by: EMERGENCY MEDICINE

## 2019-08-12 PROCEDURE — 99282 EMERGENCY DEPT VISIT SF MDM: CPT

## 2019-08-12 RX ORDER — KETOROLAC TROMETHAMINE 30 MG/ML
30 INJECTION, SOLUTION INTRAMUSCULAR; INTRAVENOUS ONCE
Status: COMPLETED | OUTPATIENT
Start: 2019-08-12 | End: 2019-08-12

## 2019-08-12 RX ORDER — ORPHENADRINE CITRATE 30 MG/ML
60 INJECTION INTRAMUSCULAR; INTRAVENOUS ONCE
Status: COMPLETED | OUTPATIENT
Start: 2019-08-12 | End: 2019-08-12

## 2019-08-12 RX ORDER — METHYLPREDNISOLONE SODIUM SUCCINATE 125 MG/2ML
80 INJECTION, POWDER, LYOPHILIZED, FOR SOLUTION INTRAMUSCULAR; INTRAVENOUS ONCE
Status: COMPLETED | OUTPATIENT
Start: 2019-08-12 | End: 2019-08-12

## 2019-08-12 RX ADMIN — KETOROLAC TROMETHAMINE 30 MG: 30 INJECTION, SOLUTION INTRAMUSCULAR at 04:57

## 2019-08-12 RX ADMIN — ORPHENADRINE CITRATE 60 MG: 30 INJECTION INTRAMUSCULAR; INTRAVENOUS at 04:56

## 2019-08-12 RX ADMIN — METHYLPREDNISOLONE SODIUM SUCCINATE 80 MG: 125 INJECTION, POWDER, FOR SOLUTION INTRAMUSCULAR; INTRAVENOUS at 04:57

## 2019-08-12 ASSESSMENT — ENCOUNTER SYMPTOMS
RHINORRHEA: 0
CHEST TIGHTNESS: 0
CONSTIPATION: 0
DIARRHEA: 0
EYE REDNESS: 0
WHEEZING: 0
EYE DISCHARGE: 0
SORE THROAT: 0
ABDOMINAL PAIN: 0
BLOOD IN STOOL: 0
EYE PAIN: 0
CHOKING: 0
SINUS PRESSURE: 0
COUGH: 0
BACK PAIN: 1
SHORTNESS OF BREATH: 0
NAUSEA: 0
ABDOMINAL DISTENTION: 0
VOMITING: 0
PHOTOPHOBIA: 0
TROUBLE SWALLOWING: 0
VOICE CHANGE: 0
EYE ITCHING: 0

## 2019-08-12 ASSESSMENT — PAIN SCALES - GENERAL
PAINLEVEL_OUTOF10: 10
PAINLEVEL_OUTOF10: 10

## 2019-08-12 ASSESSMENT — PAIN DESCRIPTION - LOCATION: LOCATION: BACK

## 2019-08-12 ASSESSMENT — PAIN DESCRIPTION - PAIN TYPE: TYPE: CHRONIC PAIN

## 2019-08-12 NOTE — ED PROVIDER NOTES
HISTORY     He indicated that his mother is alive. He indicated that his father is . family history includes Diabetes in his father and mother. SOCIAL HISTORY    reports that he has been smoking cigarettes. He started smoking about 36 years ago. He has a 7.50 pack-year smoking history. He has never used smokeless tobacco. He reports that he drinks alcohol. He reports that he does not use drugs. PHYSICAL EXAM     INITIAL VITALS:  weight is 199 lb (90.3 kg). His oral temperature is 98.8 °F (37.1 °C). His blood pressure is 140/86 (abnormal) and his pulse is 88. His respiration is 16 and oxygen saturation is 100%. Physical Exam   Constitutional: He is oriented to person, place, and time. He appears well-developed and well-nourished. No distress. HENT:   Head: Normocephalic and atraumatic. Right Ear: External ear normal.   Left Ear: External ear normal.   Nose: Nose normal.   Mouth/Throat: Oropharynx is clear and moist.   Eyes: Pupils are equal, round, and reactive to light. Conjunctivae, EOM and lids are normal. Right eye exhibits no discharge and no exudate. Left eye exhibits no discharge and no exudate. No scleral icterus. Neck: Normal range of motion. Neck supple. No JVD present. No tracheal deviation and normal range of motion present. No thyromegaly present. Cardiovascular: Normal rate, regular rhythm, S1 normal, S2 normal, normal heart sounds, intact distal pulses and normal pulses. Exam reveals no gallop and no friction rub. No murmur heard. Pulmonary/Chest: Effort normal and breath sounds normal. No stridor. No respiratory distress. He has no wheezes. He has no rales. He exhibits no tenderness. Abdominal: Soft. Bowel sounds are normal. He exhibits no distension and no mass. There is no tenderness. There is no rebound and no guarding. Musculoskeletal: Normal range of motion. He exhibits no edema or tenderness.         Right shoulder: He exhibits no tenderness, no bony tenderness, documentation, reviewed and edited the documentation which was dictated to the scribe inmy presence, and it accurately records my words and actions.     Leonel Calles DO 8/12/19 7:21 AM     Leonel Calles DO  08/12/19 0152

## 2019-08-14 ENCOUNTER — HOSPITAL ENCOUNTER (EMERGENCY)
Age: 59
Discharge: HOME | End: 2019-08-14
Payer: MEDICARE

## 2019-08-14 VITALS
TEMPERATURE: 97.6 F | SYSTOLIC BLOOD PRESSURE: 126 MMHG | RESPIRATION RATE: 16 BRPM | HEIGHT: 73 IN | OXYGEN SATURATION: 97 % | HEART RATE: 86 BPM | DIASTOLIC BLOOD PRESSURE: 81 MMHG | BODY MASS INDEX: 26.24 KG/M2 | WEIGHT: 198 LBS

## 2019-08-14 DIAGNOSIS — G89.29 ACUTE EXACERBATION OF CHRONIC LOW BACK PAIN: Primary | ICD-10-CM

## 2019-08-14 DIAGNOSIS — M54.50 ACUTE EXACERBATION OF CHRONIC LOW BACK PAIN: Primary | ICD-10-CM

## 2019-08-14 PROCEDURE — 6360000002 HC RX W HCPCS: Performed by: NURSE PRACTITIONER

## 2019-08-14 PROCEDURE — 96372 THER/PROPH/DIAG INJ SC/IM: CPT

## 2019-08-14 PROCEDURE — 99283 EMERGENCY DEPT VISIT LOW MDM: CPT

## 2019-08-14 RX ORDER — ORPHENADRINE CITRATE 30 MG/ML
60 INJECTION INTRAMUSCULAR; INTRAVENOUS ONCE
Status: COMPLETED | OUTPATIENT
Start: 2019-08-14 | End: 2019-08-14

## 2019-08-14 RX ORDER — KETOROLAC TROMETHAMINE 30 MG/ML
30 INJECTION, SOLUTION INTRAMUSCULAR; INTRAVENOUS ONCE
Status: COMPLETED | OUTPATIENT
Start: 2019-08-14 | End: 2019-08-14

## 2019-08-14 RX ADMIN — KETOROLAC TROMETHAMINE 30 MG: 30 INJECTION, SOLUTION INTRAMUSCULAR at 04:06

## 2019-08-14 RX ADMIN — ORPHENADRINE CITRATE 60 MG: 30 INJECTION INTRAMUSCULAR; INTRAVENOUS at 04:07

## 2019-08-14 ASSESSMENT — PAIN DESCRIPTION - DESCRIPTORS: DESCRIPTORS: SHARP

## 2019-08-14 ASSESSMENT — PAIN DESCRIPTION - LOCATION: LOCATION: BACK;LEG

## 2019-08-14 ASSESSMENT — PAIN DESCRIPTION - ORIENTATION: ORIENTATION: LEFT

## 2019-08-14 ASSESSMENT — PAIN SCALES - GENERAL
PAINLEVEL_OUTOF10: 5
PAINLEVEL_OUTOF10: 9

## 2019-08-14 ASSESSMENT — PAIN DESCRIPTION - PAIN TYPE: TYPE: ACUTE PAIN

## 2019-08-14 ASSESSMENT — ENCOUNTER SYMPTOMS: BACK PAIN: 1

## 2019-08-14 NOTE — ED TRIAGE NOTES
Pt presents to the ED through triage with complaints of left lower back pain that goes down his left leg. States that he was seen here three days ago with this same pain but on his right lower back. States pain tonight 9/10 that is sharp. States that he took a vicodin at home to see if it would help with the pain. Pt alert and oriented. VS and assessment as documented. Call light in reach.

## 2019-08-28 ENCOUNTER — HOSPITAL ENCOUNTER (EMERGENCY)
Age: 59
Discharge: HOME | End: 2019-08-28
Attending: FAMILY MEDICINE
Payer: OTHER GOVERNMENT

## 2019-08-28 VITALS
SYSTOLIC BLOOD PRESSURE: 129 MMHG | OXYGEN SATURATION: 100 % | HEIGHT: 74 IN | WEIGHT: 199 LBS | TEMPERATURE: 97.8 F | DIASTOLIC BLOOD PRESSURE: 78 MMHG | RESPIRATION RATE: 18 BRPM | BODY MASS INDEX: 25.54 KG/M2 | HEART RATE: 63 BPM

## 2019-08-28 DIAGNOSIS — M54.50 ACUTE EXACERBATION OF CHRONIC LOW BACK PAIN: Primary | ICD-10-CM

## 2019-08-28 DIAGNOSIS — G89.29 ACUTE EXACERBATION OF CHRONIC LOW BACK PAIN: Primary | ICD-10-CM

## 2019-08-28 PROCEDURE — 96372 THER/PROPH/DIAG INJ SC/IM: CPT

## 2019-08-28 PROCEDURE — 6360000002 HC RX W HCPCS: Performed by: FAMILY MEDICINE

## 2019-08-28 PROCEDURE — 99283 EMERGENCY DEPT VISIT LOW MDM: CPT

## 2019-08-28 PROCEDURE — 6370000000 HC RX 637 (ALT 250 FOR IP): Performed by: FAMILY MEDICINE

## 2019-08-28 RX ORDER — FAMOTIDINE 20 MG/1
20 TABLET, FILM COATED ORAL 2 TIMES DAILY
Qty: 20 TABLET | Refills: 0 | Status: SHIPPED | OUTPATIENT
Start: 2019-08-28 | End: 2020-01-13

## 2019-08-28 RX ORDER — METHYLPREDNISOLONE 4 MG/1
TABLET ORAL
Qty: 1 KIT | Refills: 0 | Status: SHIPPED | OUTPATIENT
Start: 2019-08-28 | End: 2019-09-03

## 2019-08-28 RX ORDER — ORPHENADRINE CITRATE 30 MG/ML
60 INJECTION INTRAMUSCULAR; INTRAVENOUS ONCE
Status: DISCONTINUED | OUTPATIENT
Start: 2019-08-28 | End: 2019-08-28

## 2019-08-28 RX ORDER — LIDOCAINE 4 G/G
1 PATCH TOPICAL ONCE
Status: DISCONTINUED | OUTPATIENT
Start: 2019-08-28 | End: 2019-08-28 | Stop reason: HOSPADM

## 2019-08-28 RX ORDER — LIDOCAINE 50 MG/G
1 PATCH TOPICAL DAILY
Qty: 10 PATCH | Refills: 0 | Status: SHIPPED | OUTPATIENT
Start: 2019-08-28 | End: 2019-09-07

## 2019-08-28 RX ORDER — KETOROLAC TROMETHAMINE 30 MG/ML
15 INJECTION, SOLUTION INTRAMUSCULAR; INTRAVENOUS ONCE
Status: COMPLETED | OUTPATIENT
Start: 2019-08-28 | End: 2019-08-28

## 2019-08-28 RX ORDER — METHYLPREDNISOLONE SODIUM SUCCINATE 125 MG/2ML
80 INJECTION, POWDER, LYOPHILIZED, FOR SOLUTION INTRAMUSCULAR; INTRAVENOUS ONCE
Status: COMPLETED | OUTPATIENT
Start: 2019-08-28 | End: 2019-08-28

## 2019-08-28 RX ADMIN — KETOROLAC TROMETHAMINE 15 MG: 30 INJECTION, SOLUTION INTRAMUSCULAR at 02:09

## 2019-08-28 RX ADMIN — METHYLPREDNISOLONE SODIUM SUCCINATE 80 MG: 125 INJECTION, POWDER, FOR SOLUTION INTRAMUSCULAR; INTRAVENOUS at 02:09

## 2019-08-28 ASSESSMENT — ENCOUNTER SYMPTOMS
EYE REDNESS: 0
VOMITING: 0
BACK PAIN: 1
DIARRHEA: 0
COUGH: 0
EYE DISCHARGE: 0
WHEEZING: 0
ABDOMINAL PAIN: 0
SORE THROAT: 0
NAUSEA: 0
SHORTNESS OF BREATH: 0
RHINORRHEA: 0

## 2019-08-28 ASSESSMENT — PAIN DESCRIPTION - DESCRIPTORS: DESCRIPTORS: ACHING

## 2019-08-28 ASSESSMENT — PAIN DESCRIPTION - FREQUENCY: FREQUENCY: INTERMITTENT

## 2019-08-28 ASSESSMENT — PAIN SCALES - GENERAL: PAINLEVEL_OUTOF10: 9

## 2019-08-28 ASSESSMENT — PAIN DESCRIPTION - LOCATION: LOCATION: BACK

## 2019-08-28 ASSESSMENT — PAIN DESCRIPTION - ORIENTATION: ORIENTATION: LOWER

## 2019-08-28 ASSESSMENT — PAIN DESCRIPTION - PAIN TYPE: TYPE: CHRONIC PAIN

## 2019-08-28 NOTE — ED TRIAGE NOTES
Pt presents to the ED via private vehicle with c/o back pain. Pt states that he was here last week with similar complaints and received toradol and a steroid. Pt states that that treatment worked for a couple of days and then the pain returned. Pt currently rates the pain at 9/10 when he walks. Pt states that he is scheduled to have an epidural shot next month with the [place]. VSS at this time. Respirations even and unlabord. Dr. Regina Gilmore at bedside to assess pt at this time.

## 2019-09-03 ENCOUNTER — HOSPITAL ENCOUNTER (EMERGENCY)
Age: 59
Discharge: HOME | End: 2019-09-03
Payer: MEDICARE

## 2019-09-03 ENCOUNTER — APPOINTMENT (OUTPATIENT)
Dept: GENERAL RADIOLOGY | Age: 59
End: 2019-09-03
Payer: MEDICARE

## 2019-09-03 VITALS
HEART RATE: 69 BPM | OXYGEN SATURATION: 99 % | DIASTOLIC BLOOD PRESSURE: 73 MMHG | TEMPERATURE: 98 F | SYSTOLIC BLOOD PRESSURE: 118 MMHG | RESPIRATION RATE: 18 BRPM

## 2019-09-03 PROCEDURE — 73502 X-RAY EXAM HIP UNI 2-3 VIEWS: CPT

## 2019-09-03 PROCEDURE — 99283 EMERGENCY DEPT VISIT LOW MDM: CPT

## 2019-09-03 ASSESSMENT — PAIN DESCRIPTION - LOCATION: LOCATION: HIP

## 2019-09-05 ENCOUNTER — HOSPITAL ENCOUNTER (EMERGENCY)
Age: 59
Discharge: HOME | End: 2019-09-05
Attending: EMERGENCY MEDICINE
Payer: MEDICARE

## 2019-09-05 VITALS
BODY MASS INDEX: 26.24 KG/M2 | SYSTOLIC BLOOD PRESSURE: 123 MMHG | DIASTOLIC BLOOD PRESSURE: 77 MMHG | OXYGEN SATURATION: 99 % | HEIGHT: 73 IN | TEMPERATURE: 98.1 F | WEIGHT: 198 LBS | RESPIRATION RATE: 17 BRPM | HEART RATE: 89 BPM

## 2019-09-05 DIAGNOSIS — G89.29 ACUTE EXACERBATION OF CHRONIC LOW BACK PAIN: Primary | ICD-10-CM

## 2019-09-05 DIAGNOSIS — M54.50 ACUTE EXACERBATION OF CHRONIC LOW BACK PAIN: Primary | ICD-10-CM

## 2019-09-05 PROCEDURE — 6370000000 HC RX 637 (ALT 250 FOR IP): Performed by: EMERGENCY MEDICINE

## 2019-09-05 PROCEDURE — 99283 EMERGENCY DEPT VISIT LOW MDM: CPT

## 2019-09-05 PROCEDURE — 6360000002 HC RX W HCPCS: Performed by: EMERGENCY MEDICINE

## 2019-09-05 PROCEDURE — 96372 THER/PROPH/DIAG INJ SC/IM: CPT

## 2019-09-05 RX ORDER — CYCLOBENZAPRINE HCL 10 MG
10 TABLET ORAL ONCE
Status: COMPLETED | OUTPATIENT
Start: 2019-09-05 | End: 2019-09-05

## 2019-09-05 RX ORDER — METHYLPREDNISOLONE SODIUM SUCCINATE 125 MG/2ML
80 INJECTION, POWDER, LYOPHILIZED, FOR SOLUTION INTRAMUSCULAR; INTRAVENOUS ONCE
Status: COMPLETED | OUTPATIENT
Start: 2019-09-05 | End: 2019-09-05

## 2019-09-05 RX ORDER — KETOROLAC TROMETHAMINE 30 MG/ML
30 INJECTION, SOLUTION INTRAMUSCULAR; INTRAVENOUS ONCE
Status: COMPLETED | OUTPATIENT
Start: 2019-09-05 | End: 2019-09-05

## 2019-09-05 RX ADMIN — CYCLOBENZAPRINE HYDROCHLORIDE 10 MG: 10 TABLET, FILM COATED ORAL at 01:53

## 2019-09-05 RX ADMIN — KETOROLAC TROMETHAMINE 30 MG: 30 INJECTION, SOLUTION INTRAMUSCULAR at 01:53

## 2019-09-05 RX ADMIN — METHYLPREDNISOLONE SODIUM SUCCINATE 80 MG: 125 INJECTION, POWDER, FOR SOLUTION INTRAMUSCULAR; INTRAVENOUS at 01:53

## 2019-09-05 ASSESSMENT — PAIN SCALES - GENERAL
PAINLEVEL_OUTOF10: 8
PAINLEVEL_OUTOF10: 7

## 2019-09-05 ASSESSMENT — ENCOUNTER SYMPTOMS
CONSTIPATION: 0
EYE ITCHING: 0
ABDOMINAL DISTENTION: 0
WHEEZING: 0
CHOKING: 0
BACK PAIN: 1
SHORTNESS OF BREATH: 0
COUGH: 0
TROUBLE SWALLOWING: 0
EYE DISCHARGE: 0
PHOTOPHOBIA: 0
CHEST TIGHTNESS: 0
BLOOD IN STOOL: 0
SINUS PRESSURE: 0
DIARRHEA: 0
RHINORRHEA: 0
VOICE CHANGE: 0
SORE THROAT: 0
NAUSEA: 0
VOMITING: 0
EYE PAIN: 0
EYE REDNESS: 0
ABDOMINAL PAIN: 0

## 2019-09-05 ASSESSMENT — PAIN DESCRIPTION - LOCATION: LOCATION: BACK

## 2019-09-05 NOTE — ED PROVIDER NOTES
numbness and headaches. Hematological: Negative for adenopathy. Does not bruise/bleed easily. Psychiatric/Behavioral: Negative for agitation, hallucinations and suicidal ideas. The patient is not nervous/anxious. PAST MEDICAL HISTORY    has a past medical history of Bipolar 1 disorder ([facility] [address] COPD (chronic obstructive pulmonary disease) ([facility] [address] Hypertension, and Lumbar radiculopathy. SURGICAL HISTORY      has a past surgical history that includes Foot surgery (Right); Tonsillectomy; Colonoscopy (Left, 2019); and Upper gastrointestinal endoscopy (Left, 2019). CURRENT MEDICATIONS       Discharge Medication List as of 2019  2:46 AM      CONTINUE these medications which have NOT CHANGED    Details   famotidine (PEPCID) 20 MG tablet Take 1 tablet by mouth 2 times daily, Disp-20 tablet, R-0Print      lidocaine (LIDODERM) 5 % Place 1 patch onto the skin daily for 10 days 12 hours on, 12 hours off., Disp-10 patch, R-0Print      acetaminophen (APAP EXTRA STRENGTH) 500 MG tablet Take 1 tablet by mouth every 6 hours as needed for Pain, Disp-120 tablet, R-0Print      gabapentin (NEURONTIN) 300 MG capsule Take 1 capsule by mouth every evening, Disp-15 capsule, R-0Print      albuterol sulfate  (90 BASE) MCG/ACT inhaler 2 puffs every 4-6 hours with spacer for wheezing and chest congestionwhen necessary, Disp-1 Inhaler, R-0      FLUoxetine (PROZAC) 20 MG capsule Take 20 mg by mouth daily. metoprolol (TOPROL-XL) 50 MG XL tablet Take 50 mg by mouth daily. TESTOSTERONE AQUEOUS IM Inject 300 mg into the muscle. ALLERGIES     has No Known Allergies. FAMILY HISTORY     He indicated that his mother is alive. He indicated that his father is . family history includes Diabetes in his father and mother. SOCIAL HISTORY      reports that he has been smoking cigarettes. He started smoking about 36 years ago. He has a 7.50 pack-year smoking history.  He has never used back pain. Patient has multiple prescriptions for both muscle relaxers and anti-inflammatories he is instructed to take those as prescribed. He is instructed to follow-up with the [facility] and keep scheduled appointment. Patient is instructed to call his primary care physician follow-up within the next 1 to 2 days. Patient is instructed to return to the nearest emergency room immediately for any new or worsening complaints. CRITICAL CARE:   None    CONSULTS:  None    PROCEDURES:  None    FINAL IMPRESSION      1.  Acute exacerbation of chronic low back pain          DISPOSITION/PLAN   Discharge    PATIENT REFERRED TO:  Ari Ochoa MD  [facility] [phone]    Call in 1 day      [facility]  [facility] [address]  [phone]  Call in 1 day        DISCHARGE MEDICATIONS:  Discharge Medication List as of 9/5/2019  2:46 AM          (Please note that portions of this note were completed with a voice recognition program.  Efforts were made to edit the dictations but occasionally words are mis-transcribed.)    Conni Brunner, DO Arnold Harvest,   09/05/19 [address],   09/05/19 5868

## 2019-09-24 ENCOUNTER — HOSPITAL ENCOUNTER (EMERGENCY)
Age: 59
Discharge: HOME | End: 2019-09-24
Attending: EMERGENCY MEDICINE
Payer: MEDICARE

## 2019-09-24 VITALS
HEART RATE: 67 BPM | OXYGEN SATURATION: 99 % | BODY MASS INDEX: 26.12 KG/M2 | WEIGHT: 198 LBS | RESPIRATION RATE: 16 BRPM | DIASTOLIC BLOOD PRESSURE: 99 MMHG | SYSTOLIC BLOOD PRESSURE: 116 MMHG | TEMPERATURE: 97.7 F

## 2019-09-24 DIAGNOSIS — M54.50 CHRONIC MIDLINE LOW BACK PAIN WITHOUT SCIATICA: Primary | ICD-10-CM

## 2019-09-24 DIAGNOSIS — G89.29 CHRONIC MIDLINE LOW BACK PAIN WITHOUT SCIATICA: Primary | ICD-10-CM

## 2019-09-24 PROCEDURE — 96372 THER/PROPH/DIAG INJ SC/IM: CPT

## 2019-09-24 PROCEDURE — 99282 EMERGENCY DEPT VISIT SF MDM: CPT

## 2019-09-24 PROCEDURE — 6360000002 HC RX W HCPCS: Performed by: EMERGENCY MEDICINE

## 2019-09-24 PROCEDURE — 6370000000 HC RX 637 (ALT 250 FOR IP): Performed by: EMERGENCY MEDICINE

## 2019-09-24 RX ORDER — PREDNISONE 10 MG/1
20 TABLET ORAL DAILY
Qty: 10 TABLET | Refills: 0 | Status: SHIPPED | OUTPATIENT
Start: 2019-09-24 | End: 2019-09-29

## 2019-09-24 RX ORDER — CYCLOBENZAPRINE HCL 5 MG
5 TABLET ORAL 2 TIMES DAILY PRN
Qty: 10 TABLET | Refills: 0 | Status: SHIPPED | OUTPATIENT
Start: 2019-09-24 | End: 2019-10-04

## 2019-09-24 RX ORDER — METHYLPREDNISOLONE SODIUM SUCCINATE 125 MG/2ML
80 INJECTION, POWDER, LYOPHILIZED, FOR SOLUTION INTRAMUSCULAR; INTRAVENOUS ONCE
Status: COMPLETED | OUTPATIENT
Start: 2019-09-24 | End: 2019-09-24

## 2019-09-24 RX ORDER — IBUPROFEN 400 MG/1
400 TABLET ORAL EVERY 6 HOURS PRN
Qty: 120 TABLET | Refills: 0 | Status: SHIPPED | OUTPATIENT
Start: 2019-09-24 | End: 2020-01-13

## 2019-09-24 RX ORDER — KETOROLAC TROMETHAMINE 30 MG/ML
30 INJECTION, SOLUTION INTRAMUSCULAR; INTRAVENOUS ONCE
Status: COMPLETED | OUTPATIENT
Start: 2019-09-24 | End: 2019-09-24

## 2019-09-24 RX ORDER — CYCLOBENZAPRINE HCL 10 MG
10 TABLET ORAL ONCE
Status: COMPLETED | OUTPATIENT
Start: 2019-09-24 | End: 2019-09-24

## 2019-09-24 RX ADMIN — METHYLPREDNISOLONE SODIUM SUCCINATE 80 MG: 125 INJECTION, POWDER, FOR SOLUTION INTRAMUSCULAR; INTRAVENOUS at 01:59

## 2019-09-24 RX ADMIN — CYCLOBENZAPRINE 10 MG: 10 TABLET, FILM COATED ORAL at 02:00

## 2019-09-24 RX ADMIN — KETOROLAC TROMETHAMINE 30 MG: 30 INJECTION, SOLUTION INTRAMUSCULAR at 01:59

## 2019-09-24 ASSESSMENT — ENCOUNTER SYMPTOMS
VOMITING: 0
ABDOMINAL DISTENTION: 0
CONSTIPATION: 0
WHEEZING: 0
PHOTOPHOBIA: 0
BACK PAIN: 1
EYE PAIN: 0
CHEST TIGHTNESS: 0
EYE REDNESS: 0
SHORTNESS OF BREATH: 0
RHINORRHEA: 0
NAUSEA: 0
BLOOD IN STOOL: 0
ABDOMINAL PAIN: 0
TROUBLE SWALLOWING: 0
EYE ITCHING: 0
EYE DISCHARGE: 0
DIARRHEA: 0
SORE THROAT: 0
VOICE CHANGE: 0
CHOKING: 0
SINUS PRESSURE: 0
COUGH: 0

## 2019-09-24 ASSESSMENT — PAIN DESCRIPTION - LOCATION: LOCATION: BACK

## 2019-09-24 ASSESSMENT — PAIN SCALES - GENERAL
PAINLEVEL_OUTOF10: 7
PAINLEVEL_OUTOF10: 7

## 2019-09-24 ASSESSMENT — PAIN DESCRIPTION - PAIN TYPE: TYPE: ACUTE PAIN

## 2019-09-24 NOTE — ED TRIAGE NOTES
Pt to ED by self with complaints of non relieving chronic back pain for the past three days. Pt states his pain is a 7/10 at this time in his lower left back that radiates to his legs. Pt states he does not want any xray's he would just like to be seen for pain management. Pt vital signs are stable. Pt is alert and oriented x4. Pt denies further needs.

## 2019-12-16 ENCOUNTER — HOSPITAL ENCOUNTER (EMERGENCY)
Age: 59
Discharge: HOME | End: 2019-12-16
Attending: EMERGENCY MEDICINE
Payer: MEDICARE

## 2019-12-16 VITALS
RESPIRATION RATE: 18 BRPM | WEIGHT: 193 LBS | TEMPERATURE: 97.6 F | DIASTOLIC BLOOD PRESSURE: 81 MMHG | HEART RATE: 69 BPM | SYSTOLIC BLOOD PRESSURE: 124 MMHG | OXYGEN SATURATION: 96 % | BODY MASS INDEX: 25.46 KG/M2

## 2019-12-16 DIAGNOSIS — M54.30 SCIATICA, UNSPECIFIED LATERALITY: Primary | ICD-10-CM

## 2019-12-16 PROCEDURE — 6360000002 HC RX W HCPCS: Performed by: EMERGENCY MEDICINE

## 2019-12-16 PROCEDURE — 99283 EMERGENCY DEPT VISIT LOW MDM: CPT

## 2019-12-16 PROCEDURE — 96372 THER/PROPH/DIAG INJ SC/IM: CPT

## 2019-12-16 RX ORDER — ORPHENADRINE CITRATE 30 MG/ML
60 INJECTION INTRAMUSCULAR; INTRAVENOUS ONCE
Status: COMPLETED | OUTPATIENT
Start: 2019-12-16 | End: 2019-12-16

## 2019-12-16 RX ORDER — KETOROLAC TROMETHAMINE 30 MG/ML
30 INJECTION, SOLUTION INTRAMUSCULAR; INTRAVENOUS ONCE
Status: COMPLETED | OUTPATIENT
Start: 2019-12-16 | End: 2019-12-16

## 2019-12-16 RX ADMIN — ORPHENADRINE CITRATE 60 MG: 30 INJECTION INTRAMUSCULAR; INTRAVENOUS at 01:00

## 2019-12-16 RX ADMIN — KETOROLAC TROMETHAMINE 30 MG: 30 INJECTION, SOLUTION INTRAMUSCULAR at 01:00

## 2019-12-16 ASSESSMENT — PAIN SCALES - GENERAL
PAINLEVEL_OUTOF10: 8
PAINLEVEL_OUTOF10: 8

## 2019-12-16 ASSESSMENT — ENCOUNTER SYMPTOMS
ABDOMINAL PAIN: 0
BACK PAIN: 1
SORE THROAT: 0
SHORTNESS OF BREATH: 0
NAUSEA: 0
COUGH: 0
VOMITING: 0
WHEEZING: 0

## 2019-12-16 ASSESSMENT — PAIN DESCRIPTION - PAIN TYPE: TYPE: ACUTE PAIN

## 2019-12-16 ASSESSMENT — PAIN DESCRIPTION - ORIENTATION: ORIENTATION: LEFT

## 2019-12-16 ASSESSMENT — PAIN DESCRIPTION - LOCATION: LOCATION: HIP;LEG

## 2020-01-13 ENCOUNTER — OFFICE VISIT (OUTPATIENT)
Dept: UROLOGY | Age: 60
End: 2020-01-13
Payer: MEDICARE

## 2020-01-13 VITALS
SYSTOLIC BLOOD PRESSURE: 112 MMHG | BODY MASS INDEX: 25.8 KG/M2 | HEIGHT: 73 IN | DIASTOLIC BLOOD PRESSURE: 64 MMHG | WEIGHT: 194.7 LBS

## 2020-01-13 LAB
BILIRUBIN URINE: NEGATIVE
BLOOD URINE, POC: NEGATIVE
CHARACTER, URINE: CLEAR
COLOR, URINE: YELLOW
GLUCOSE URINE: NEGATIVE MG/DL
KETONES, URINE: NEGATIVE
LEUKOCYTE CLUMPS, URINE: NEGATIVE
NITRITE, URINE: NEGATIVE
PH, URINE: 6.5 (ref 5–9)
PROTEIN, URINE: NEGATIVE MG/DL
SPECIFIC GRAVITY, URINE: 1.01 (ref 1–1.03)
UROBILINOGEN, URINE: 0.2 EU/DL (ref 0–1)

## 2020-01-13 PROCEDURE — 81003 URINALYSIS AUTO W/O SCOPE: CPT | Performed by: UROLOGY

## 2020-01-13 PROCEDURE — 99204 OFFICE O/P NEW MOD 45 MIN: CPT | Performed by: UROLOGY

## 2020-01-13 NOTE — PROGRESS NOTES
Dr. Estefania Stout MD  [facility] Urology Clinic Consultation / New Patient Visit    Patient:  Clifford Meza  YOB: 1960  Date: 1/13/2020  Consult requested from Sandrita Mijares MD     HISTORY OF PRESENT ILLNESS:   The patient is a 61 y.o. male who presents today for follow-up for the following problem(s): ED  Overall the problem(s) : show no change. Associated Symptoms: No dysuria, gross hematuria. Pain Severity:      Today visit:   Pt on Trimix  Doing well  Needs Rx refilled  - Discussed risks and benefits    Summary of old records:   (Patient's old records, notes and chart reviewed and summarized above.)  Clifford Meza f/u today for Erectile Dysfunction. Previous patient of Dr. Chaka Stoner. He remains on Trimix injections which are working well for him, however he is interested in vascular reconstructive surgery for his ED. Unable to achieve erection without Trimix injection. He comes in today by himself. Hx is obtained from the patient and medical record.      Last several PSA's:  Lab Results   Component Value Date    PSA 1.08 (H) 11/14/2018    PSA 1.36 04/18/2018    PSA 1.47 10/17/2017       Last total testosterone:  No results found for: TESTOSTERONE    Urinalysis today:  Results for POC orders placed in visit on 01/13/20   POCT Urinalysis No Micro (Auto)   Result Value Ref Range    Glucose, Ur Negative NEGATIVE mg/dl    Bilirubin Urine Negative     Ketones, Urine Negative NEGATIVE    Specific Gravity, Urine 1.015 1.002 - 1.03    Blood, UA POC Negative NEGATIVE    pH, Urine 6.50 5.0 - 9.0    Protein, Urine Negative NEGATIVE mg/dl    Urobilinogen, Urine 0.20 0.0 - 1.0 eu/dl    Nitrite, Urine Negative NEGATIVE    Leukocyte Clumps, Urine Negative NEGATIVE    Color, Urine Yellow YELLOW-STR    Character, Urine Clear [id number]         Last BUN and creatinine:  Lab Results   Component Value Date    BUN 14 11/18/2018     Lab Results   Component Value Date    CREATININE 1.1 11/18/2018

## 2020-01-13 NOTE — PATIENT INSTRUCTIONS
trying to quit smoking. · Consider signing up for a smoking cessation program, such as the American Lung Association's Freedom from Smoking program.  · Get text messaging support. Go to the website at www.smokefree. gov to sign up for the [facility] program.  · Set a quit date. Pick your date carefully so that it is not right in the middle of a big deadline or stressful time. Once you quit, do not even take a puff. Get rid of all ashtrays and lighters after your last cigarette. Clean your house and your clothes so that they do not smell of smoke. · Learn how to be a nonsmoker. Think about ways you can avoid those things that make you reach for a cigarette. ? Avoid situations that put you at greatest risk for smoking. For some people, it is hard to have a drink with friends without smoking. For others, they might skip a coffee break with coworkers who smoke. ? Change your daily routine. Take a different route to work or eat a meal in a different place. · Cut down on stress. Calm yourself or release tension by doing an activity you enjoy, such as reading a book, taking a hot bath, or gardening. · Talk to your doctor or pharmacist about nicotine replacement therapy, which replaces the nicotine in your body. You still get nicotine but you do not use tobacco. Nicotine replacement products help you slowly reduce the amount of nicotine you need. These products come in several forms, many of them available over-the-counter:  ? Nicotine patches  ? Nicotine gum and lozenges  ? Nicotine inhaler  · Ask your doctor about bupropion (Wellbutrin) or varenicline (Chantix), which are prescription medicines. They do not contain nicotine. They help you by reducing withdrawal symptoms, such as stress and anxiety. · Some people find hypnosis, acupuncture, and massage helpful for ending the smoking habit. · Eat a healthy diet and get regular exercise.  Having healthy habits will help your body move past its craving for nicotine. · Be prepared to keep trying. Most people are not successful the first few times they try to quit. Do not get mad at yourself if you smoke again. Make a list of things you learned and think about when you want to try again, such as next week, next month, or next year. Where can you learn more? Go to https://Cloud9 IDEpeyanethewamy.Runa. org and sign in to your [employer] account. Enter K687 in the Aorato box to learn more about \"Stopping Smoking: Care Instructions. \"     If you do not have an account, please click on the \"Sign Up Now\" link. Current as of: July 4, 2019  Content Version: 12.3  © 9961-3273 Healthwise, Incorporated. Care instructions adapted under license by [facility] ([facility]). If you have questions about a medical condition or this instruction, always ask your healthcare professional. [address] any warranty or liability for your use of this information.

## 2020-03-30 ENCOUNTER — HOSPITAL ENCOUNTER (EMERGENCY)
Age: 60
Discharge: HOME | End: 2020-03-30
Payer: MEDICARE

## 2020-03-30 VITALS
DIASTOLIC BLOOD PRESSURE: 67 MMHG | HEART RATE: 73 BPM | TEMPERATURE: 97.7 F | RESPIRATION RATE: 17 BRPM | WEIGHT: 200 LBS | OXYGEN SATURATION: 97 % | BODY MASS INDEX: 27.09 KG/M2 | SYSTOLIC BLOOD PRESSURE: 145 MMHG | HEIGHT: 72 IN

## 2020-03-30 PROCEDURE — 96372 THER/PROPH/DIAG INJ SC/IM: CPT

## 2020-03-30 PROCEDURE — 6360000002 HC RX W HCPCS: Performed by: PHYSICIAN ASSISTANT

## 2020-03-30 PROCEDURE — 99285 EMERGENCY DEPT VISIT HI MDM: CPT

## 2020-03-30 PROCEDURE — 99284 EMERGENCY DEPT VISIT MOD MDM: CPT

## 2020-03-30 PROCEDURE — 6370000000 HC RX 637 (ALT 250 FOR IP): Performed by: PHYSICIAN ASSISTANT

## 2020-03-30 RX ORDER — BACITRACIN, NEOMYCIN, POLYMYXIN B 400; 3.5; 5 [USP'U]/G; MG/G; [USP'U]/G
OINTMENT TOPICAL
Qty: 1 TUBE | Refills: 0 | Status: SHIPPED | OUTPATIENT
Start: 2020-03-30 | End: 2020-04-09

## 2020-03-30 RX ORDER — KETOROLAC TROMETHAMINE 30 MG/ML
30 INJECTION, SOLUTION INTRAMUSCULAR; INTRAVENOUS ONCE
Status: COMPLETED | OUTPATIENT
Start: 2020-03-30 | End: 2020-03-30

## 2020-03-30 RX ORDER — PREDNISONE 20 MG/1
60 TABLET ORAL ONCE
Status: COMPLETED | OUTPATIENT
Start: 2020-03-30 | End: 2020-03-30

## 2020-03-30 RX ORDER — CHOLECALCIFEROL (VITAMIN D3) 125 MCG
1 CAPSULE ORAL
Qty: 30 TABLET | Refills: 0 | Status: SHIPPED | OUTPATIENT
Start: 2020-03-30

## 2020-03-30 RX ORDER — CYCLOBENZAPRINE HCL 10 MG
10 TABLET ORAL 3 TIMES DAILY PRN
Qty: 12 TABLET | Refills: 0 | Status: SHIPPED | OUTPATIENT
Start: 2020-03-30 | End: 2020-11-03

## 2020-03-30 RX ORDER — KETOROLAC TROMETHAMINE 10 MG/1
10 TABLET, FILM COATED ORAL EVERY 6 HOURS PRN
Qty: 15 TABLET | Refills: 0 | Status: SHIPPED | OUTPATIENT
Start: 2020-03-30 | End: 2020-06-04

## 2020-03-30 RX ORDER — CALCIUM POLYCARBOPHIL 625 MG
1 TABLET ORAL DAILY
Qty: 14 TABLET | Refills: 0 | Status: SHIPPED | OUTPATIENT
Start: 2020-03-30

## 2020-03-30 RX ADMIN — PREDNISONE 60 MG: 20 TABLET ORAL at 04:02

## 2020-03-30 RX ADMIN — KETOROLAC TROMETHAMINE 30 MG: 30 INJECTION, SOLUTION INTRAMUSCULAR at 04:02

## 2020-03-30 ASSESSMENT — ENCOUNTER SYMPTOMS
VOMITING: 0
BACK PAIN: 1
DIARRHEA: 1
COUGH: 0
SHORTNESS OF BREATH: 0
RHINORRHEA: 0
ABDOMINAL PAIN: 0
NAUSEA: 0
PHOTOPHOBIA: 0

## 2020-03-30 ASSESSMENT — PAIN DESCRIPTION - ORIENTATION: ORIENTATION: LOWER

## 2020-03-30 ASSESSMENT — PAIN DESCRIPTION - PAIN TYPE: TYPE: ACUTE PAIN;CHRONIC PAIN

## 2020-03-30 ASSESSMENT — PAIN DESCRIPTION - LOCATION: LOCATION: BACK

## 2020-03-30 ASSESSMENT — PAIN SCALES - GENERAL
PAINLEVEL_OUTOF10: 10
PAINLEVEL_OUTOF10: 10

## 2020-03-30 NOTE — ED NOTES
Pt stable A&O x 3 given discharge and follow up info. Pt voiced no concerns and discharged from ER to self to home. Pt ambulated out of ER with no complications .        Luis Miguel Carter RN  03/30/20 1528

## 2020-03-30 NOTE — ED PROVIDER NOTES
[address] [facility] [address] [address] EMERGENCY DEPT      CHIEF COMPLAINT       Chief Complaint   Patient presents with    Back Pain       Nurses Notes reviewed and I agree except as noted in the HPI. HISTORY OF PRESENT ILLNESS    Michela Narayanan is a 61 y.o. male who presents for multiple complaints. First is low back pain. 2 days prior patient was wrestling with a friend and experienced acute exacerbation of chronic low back pain. He describes the pain as sharp. It is worse with walking and certain movements and decreases with sitting still. It is similar to prior exacerbations. He gets epidurals in [place] however they were canceled due to the pandemic. He is requesting a pain shot and steroids. He denies incontinence of bowel or bladder, abdominal pain, or urinary complaints. Second complaint is request for prescription for Lactaid and FiberCon. Patient states he has a history of lactose intolerance and has not had a solid stool in \"quite some time. \"  He denies nausea, vomiting, weakness, or dizziness. Third complaint/request is a prescription for an ointment for his abrasions that also occurred during this wrestling episode. Patient reports his tetanus is up-to-date. Location/Symptom: Low back pain  Timing/Onset: 2 days ago   Context/Setting: Occurred after wrestling with his friend  Quality: Arin Abilio  Duration: Intermittent  Modifying Factors: Worse with walking and certain movements decreasing with sitting still; in the past pain shot and steroids have helped  Severity: Moderate    REVIEW OF SYSTEMS     Review of Systems   Constitutional: Negative for activity change, chills and fever. HENT: Negative for congestion, ear pain and rhinorrhea. Eyes: Negative for photophobia. Respiratory: Negative for cough and shortness of breath. Cardiovascular: Negative for chest pain. Gastrointestinal: Positive for diarrhea. Negative for abdominal pain, nausea and vomiting.         No incontinence of bowel    Endocrine: Neurological:      Mental Status: He is alert. GCS: GCS eye subscore is 4. GCS verbal subscore is 5. GCS motor subscore is 6. Sensory: No sensory deficit. Coordination: Coordination normal.      Gait: Gait normal.      Comments: There is no saddle anesthesia. Psychiatric:         Speech: Speech normal.         Behavior: Behavior normal. Behavior is cooperative. Thought Content: Thought content normal.         DIFFERENTIAL DIAGNOSIS:   Including but not limited to: Acute on chronic low back pain, lumbar radiculopathy, abrasions, lactose intolerance    DIAGNOSTIC RESULTS     EKG: All E[facility]'s are [facility] Phys[facility] who either signs or Co-signs this chart in the absence of a cardiologist.  None    RADIOLOGY: non-plain film images(s) such as CT,Ultrasound and MRI are read by the radiologist.  Plain radiographic images are visualized and preliminarily interpreted by the emergency physician unless otherwise stated below. No orders to display       LABS:   Labs Reviewed - No data to display    EMERGENCY DEPARTMENT COURSE:   Vitals:    Vitals:    03/30/20 0341   BP: (!) 150/86   Pulse: 76   Resp: 16   Temp: 97.7 °F (36.5 °C)   TempSrc: Oral   SpO2: 96%   Weight: 200 lb (90.7 kg)   Height: 6' (1.829 m)       MDM:  The patient was seen and evaluated within the ED today for multiple complaints including low back pain and requests for multiple prescriptions. On exam, I appreciated diffuse paraspinal reproducible tenderness in the lumbar region. Straight leg raise was negative and patient was neurovascularly intact. Old records were reviewed. Within the department, I observed the patient's vital signs to be within acceptable range. Labs and imaging were not deemed necessary. Within the department, the patient was treated with Toradol and prednisone. I observed the patient's condition to modestly improve during the duration of their stay.  The patient has remained stable in the ER with good vital signs, neurovascularly intact, and no distress evident. Patient demonstrated a steady, independent gait. I have considered compression fracture, dehydration and this patient's presentation is not consistent with such entities and therefore, no further testing was warranted. The patient was comfortable with the plan of discharge home and to follow up with [facility]'s family medicine clinic. Anticipatory guidance was given. The patient was instructed to return to the emergency department for any worsening of their symptoms. Patient was discharged from the emergency department in good condition with all questions answered. See disposition below. I have given the patient strict written and verbal instructions about care at home, follow-up, and signs and symptoms of worsening of condition and they did verbalize understanding. CRITICAL CARE:   None    CONSULTS:  None    PROCEDURES:  None    FINAL IMPRESSION      1. Acute exacerbation of chronic low back pain    2. Abrasions of multiple sites    3. Lactose intolerance    4. Chronic diarrhea          DISPOSITION/PLAN     1. Acute exacerbation of chronic low back pain    2. Abrasions of multiple sites    3. Lactose intolerance    4. Chronic diarrhea        PATIENT REFERRED TO:  [address] [place] [address],[address] [address] [facility] [address] [facility]  Schedule an appointment as soon as possible for a visit   Please arrive 15 minutes early for paperwork.       DISCHARGE MEDICATIONS:  New Prescriptions    CALCIUM POLYCARBOPHIL (FIBER) 625 MG TABS    Take 1 tablet by mouth daily    CYCLOBENZAPRINE (FLEXERIL) 10 MG TABLET    Take 1 tablet by mouth 3 times daily as needed for Muscle spasms    KETOROLAC (TORADOL) 10 MG TABLET    Take 1 tablet by mouth every 6 hours as needed for Pain    LACTASE (LACTAID) 3000 UNITS TABLET    Take 1 tablet by mouth 3 times daily (with meals)    NEOMYCIN-BACITRACIN-POLYMYXIN (NEOSPORIN) 400-5-5000 OINTMENT    Apply topically 2 times daily.        (Please note that portions of this note were completed with a voice recognition program.  Efforts were made to edit the dictations but occasionally words are mis-transcribed.)      Claude Brawn, PA-C 03/30/20 4:27 AM    Claude Brawn, PA-C Claude Brawn, PA-C  03/30/20 5851

## 2020-04-15 ENCOUNTER — HOSPITAL ENCOUNTER (EMERGENCY)
Age: 60
Discharge: HOME | End: 2020-04-15
Attending: EMERGENCY MEDICINE
Payer: MEDICARE

## 2020-04-15 VITALS
HEART RATE: 79 BPM | BODY MASS INDEX: 26.51 KG/M2 | RESPIRATION RATE: 19 BRPM | OXYGEN SATURATION: 100 % | DIASTOLIC BLOOD PRESSURE: 85 MMHG | HEIGHT: 73 IN | SYSTOLIC BLOOD PRESSURE: 132 MMHG | TEMPERATURE: 97.4 F | WEIGHT: 200 LBS

## 2020-04-15 PROCEDURE — 6360000002 HC RX W HCPCS: Performed by: EMERGENCY MEDICINE

## 2020-04-15 PROCEDURE — 99283 EMERGENCY DEPT VISIT LOW MDM: CPT

## 2020-04-15 PROCEDURE — 96372 THER/PROPH/DIAG INJ SC/IM: CPT

## 2020-04-15 RX ORDER — IBUPROFEN 400 MG/1
400 TABLET ORAL EVERY 6 HOURS PRN
Qty: 40 TABLET | Refills: 0 | Status: SHIPPED | OUTPATIENT
Start: 2020-04-15 | End: 2021-07-08

## 2020-04-15 RX ORDER — PREDNISONE 10 MG/1
10 TABLET ORAL DAILY
Qty: 7 TABLET | Refills: 0 | Status: SHIPPED | OUTPATIENT
Start: 2020-04-15 | End: 2020-04-22

## 2020-04-15 RX ORDER — ORPHENADRINE CITRATE 30 MG/ML
60 INJECTION INTRAMUSCULAR; INTRAVENOUS ONCE
Status: COMPLETED | OUTPATIENT
Start: 2020-04-15 | End: 2020-04-15

## 2020-04-15 RX ORDER — METHYLPREDNISOLONE SODIUM SUCCINATE 125 MG/2ML
60 INJECTION, POWDER, LYOPHILIZED, FOR SOLUTION INTRAMUSCULAR; INTRAVENOUS ONCE
Status: COMPLETED | OUTPATIENT
Start: 2020-04-15 | End: 2020-04-15

## 2020-04-15 RX ORDER — KETOROLAC TROMETHAMINE 30 MG/ML
30 INJECTION, SOLUTION INTRAMUSCULAR; INTRAVENOUS ONCE
Status: COMPLETED | OUTPATIENT
Start: 2020-04-15 | End: 2020-04-15

## 2020-04-15 RX ADMIN — METHYLPREDNISOLONE SODIUM SUCCINATE 60 MG: 125 INJECTION, POWDER, FOR SOLUTION INTRAMUSCULAR; INTRAVENOUS at 03:10

## 2020-04-15 RX ADMIN — ORPHENADRINE CITRATE 60 MG: 30 INJECTION INTRAMUSCULAR; INTRAVENOUS at 03:11

## 2020-04-15 RX ADMIN — KETOROLAC TROMETHAMINE 30 MG: 30 INJECTION, SOLUTION INTRAMUSCULAR at 03:10

## 2020-04-15 ASSESSMENT — ENCOUNTER SYMPTOMS
VOMITING: 0
CHOKING: 0
EYE PAIN: 0
DIARRHEA: 0
WHEEZING: 0
ABDOMINAL PAIN: 0
EYE ITCHING: 0
PHOTOPHOBIA: 0
CONSTIPATION: 0
ABDOMINAL DISTENTION: 0
EYE DISCHARGE: 0
BLOOD IN STOOL: 0
SORE THROAT: 0
NAUSEA: 0
VOICE CHANGE: 0
RHINORRHEA: 0
TROUBLE SWALLOWING: 0
EYE REDNESS: 0
BACK PAIN: 1
SINUS PRESSURE: 0
SHORTNESS OF BREATH: 0
CHEST TIGHTNESS: 0
COUGH: 0

## 2020-04-15 ASSESSMENT — PAIN SCALES - GENERAL
PAINLEVEL_OUTOF10: 8
PAINLEVEL_OUTOF10: 3
PAINLEVEL_OUTOF10: 8

## 2020-04-15 ASSESSMENT — PAIN DESCRIPTION - PAIN TYPE: TYPE: CHRONIC PAIN

## 2020-04-15 ASSESSMENT — PAIN DESCRIPTION - ORIENTATION: ORIENTATION: LOWER

## 2020-04-15 ASSESSMENT — PAIN DESCRIPTION - LOCATION: LOCATION: BACK

## 2020-04-15 NOTE — ED PROVIDER NOTES
[facility]  eMERGENCY dEPARTMENT eNCOUnter          CHIEF COMPLAINT       Chief Complaint   Patient presents with    Back Pain       Nurses Notes reviewed and I agreeexcept as noted in the HPI. HISTORY OF PRESENT ILLNESS    Issa Griffin is a 61 y.o. male who presents to the Emergency Department seeking pain shots for lower back pain. Patient reports that he is having an acute exacerbation of chronic lower back pain. Patient has been seen here in the department frequently for similar complaints. He reports that he normally goes to [address] [place] [address] for treatment of his chronic pain, but he states that he can not make it down there. Patient states that he is looking for steroids for treatment of the pain. Patient denies any falls or injuries that may have caused the exacerbation. The patient denies any other symptoms or relevant history at this time. The HPI was provided by the patient. REVIEW OF SYSTEMS      Review of Systems   Constitutional: Negative for activity change, appetite change, diaphoresis, fatigue and unexpected weight change. HENT: Negative for congestion, ear discharge, ear pain, hearing loss, rhinorrhea, sinus pressure, sore throat, trouble swallowing and voice change. Eyes: Negative for photophobia, pain, discharge, redness and itching. Respiratory: Negative for cough, choking, chest tightness, shortness of breath and wheezing. Cardiovascular: Negative for chest pain, palpitations and leg swelling. Gastrointestinal: Negative for abdominal distention, abdominal pain, blood in stool, constipation, diarrhea, nausea and vomiting. Endocrine: Negative for polydipsia, polyphagia and polyuria. Genitourinary: Negative for decreased urine volume, difficulty urinating, dysuria, enuresis, frequency, hematuria and urgency. Musculoskeletal: Positive for back pain (lower, chronic). Negative for arthralgias, gait problem, myalgias, neck pain and neck stiffness.    Skin: Right shoulder: He exhibits no tenderness, no bony tenderness, no crepitus and normal strength. Comments: Patient is able to ambulate normally. Lymphadenopathy:      Cervical: No cervical adenopathy. Skin:     General: Skin is warm and dry. Findings: No bruising, ecchymosis, lesion or rash. Neurological:      Mental Status: He is alert and oriented to person, place, and time. Cranial Nerves: No cranial nerve deficit. Sensory: No sensory deficit. Coordination: Coordination normal.      Deep Tendon Reflexes: Reflexes are normal and symmetric. Psychiatric:         Speech: Speech normal.         Behavior: Behavior normal.         Thought Content: Thought content normal.         Judgment: Judgment normal.           DIFFERENTIAL DIAGNOSIS:   Differential diagnoses were discussed extensively with the patient and family including but no limited to acute exacerbation of chronic symptoms, encounter seeking steroid medications. DIAGNOSTIC RESULTS     EKG: All EKG's are interpreted by the Emergency Department Physician who either signs or Co-signs this chart in the absence of a cardiologist.  EKG interpreted by Eligio Bellamy, DO:    none        RADIOLOGY: non-plain film images(s) such as CT, Ultrasound and MRI are read by the radiologist.    No orders to display       LABS:   Labs Reviewed - No data to display    EMERGENCY DEPARTMENT COURSE:   Vitals:    Vitals:    04/15/20 0305 04/15/20 0308   BP:  (!) 129/90   Pulse:  86   Resp:  20   Temp:  97.4 °F (36.3 °C)   TempSrc:  Oral   SpO2:  97%   Weight: 200 lb (90.7 kg)    Height: 6' 1\" (1.854 m)      3:06 AM EDT: The patient was seen andevaluated. Appropriate labs were ordered and reviewed. Patient is here for his shots. He is most interested in getting the anti-inflammatories and steroids. Patient states that he did not have any new accidents. He is just having chronic pain.   [facility] so he wanted to get a prescription for anti-inflammatories here. Physically the patient is able to walk. He is neurovascularly intact. Reflexes are good. He has no muscle strength difficulties. Sensation is grossly intact. At this point I made the decision to treat. Patient was given Toradol Solu-Medrol and Norflex. He will be sent home with a prescription for prednisone and Motrin. He is instructed to follow-up with the [place] and get there as soon as he can. Patient understood and agreed with the plan. Patient is subsequently discharged home in good condition. Patient has what appears to be an acute exacerbation of chronic low back pain. Patient has been given a prescription for steroids and for anti-inflammatories he is instructed to use those as prescribed. [facility] and [facility] for an appointment within the next 1 to 2 days. He is instructed to return to the nearest emergency room immediately for any new or worsening complaints. CRITICALCARE:   none     CONSULTS:  none    PROCEDURES:  None    FINAL IMPRESSION      1. Acute exacerbation of chronic low back pain          DISPOSITION/PLAN   Discharge    PATIENT REFERRED TO:  Jyotsna Davila 371    Call in 1 day        DISCHARGE MEDICATIONS:  New Prescriptions    IBUPROFEN (IBU) 400 MG TABLET    Take 1 tablet by mouth every 6 hours as needed for Pain    PREDNISONE (DELTASONE) 10 MG TABLET    Take 1 tablet by mouth daily for 7 days       (Please note that portions of this note were completed with a voice recognition program.  Efforts weremade to edit the dictations but occasionally words are mis-transcribed.)    Scribe:  By signing my name below, IJanee, attest that this documentation has been prepared under the direction and in the presence of Cecilia Gonzales DO. Electronically Signed: Sis Escobedo, 4/15/20, 3:07 AM EDT.       Cecilia Gonzales DO 4/15/20 3:19 AM        Cecilia Gonzales DO  04/15/20 [id number]

## 2020-05-21 ENCOUNTER — HOSPITAL ENCOUNTER (EMERGENCY)
Age: 60
Discharge: HOME | End: 2020-05-21
Payer: MEDICARE

## 2020-05-21 VITALS
TEMPERATURE: 98.5 F | HEIGHT: 73 IN | DIASTOLIC BLOOD PRESSURE: 90 MMHG | HEART RATE: 82 BPM | WEIGHT: 200 LBS | OXYGEN SATURATION: 100 % | SYSTOLIC BLOOD PRESSURE: 133 MMHG | RESPIRATION RATE: 16 BRPM | BODY MASS INDEX: 26.51 KG/M2

## 2020-05-21 PROCEDURE — 6360000002 HC RX W HCPCS: Performed by: NURSE PRACTITIONER

## 2020-05-21 PROCEDURE — 99282 EMERGENCY DEPT VISIT SF MDM: CPT

## 2020-05-21 PROCEDURE — 96372 THER/PROPH/DIAG INJ SC/IM: CPT

## 2020-05-21 RX ORDER — METHYLPREDNISOLONE SODIUM SUCCINATE 125 MG/2ML
60 INJECTION, POWDER, LYOPHILIZED, FOR SOLUTION INTRAMUSCULAR; INTRAVENOUS ONCE
Status: COMPLETED | OUTPATIENT
Start: 2020-05-21 | End: 2020-05-21

## 2020-05-21 RX ORDER — KETOROLAC TROMETHAMINE 30 MG/ML
30 INJECTION, SOLUTION INTRAMUSCULAR; INTRAVENOUS ONCE
Status: COMPLETED | OUTPATIENT
Start: 2020-05-21 | End: 2020-05-21

## 2020-05-21 RX ORDER — ORPHENADRINE CITRATE 30 MG/ML
60 INJECTION INTRAMUSCULAR; INTRAVENOUS ONCE
Status: COMPLETED | OUTPATIENT
Start: 2020-05-21 | End: 2020-05-21

## 2020-05-21 RX ADMIN — METHYLPREDNISOLONE SODIUM SUCCINATE 60 MG: 125 INJECTION, POWDER, FOR SOLUTION INTRAMUSCULAR; INTRAVENOUS at 05:14

## 2020-05-21 RX ADMIN — ORPHENADRINE CITRATE 60 MG: 30 INJECTION INTRAMUSCULAR; INTRAVENOUS at 05:15

## 2020-05-21 RX ADMIN — KETOROLAC TROMETHAMINE 30 MG: 30 INJECTION, SOLUTION INTRAMUSCULAR at 05:15

## 2020-05-21 ASSESSMENT — PAIN DESCRIPTION - PAIN TYPE: TYPE: ACUTE PAIN

## 2020-05-21 ASSESSMENT — ENCOUNTER SYMPTOMS
SHORTNESS OF BREATH: 0
SORE THROAT: 0
NAUSEA: 0
ABDOMINAL PAIN: 0
COUGH: 0
BACK PAIN: 1
VOMITING: 0
RHINORRHEA: 0

## 2020-05-21 ASSESSMENT — PAIN SCALES - GENERAL: PAINLEVEL_OUTOF10: 8

## 2020-05-21 ASSESSMENT — PAIN DESCRIPTION - ORIENTATION: ORIENTATION: LOWER

## 2020-05-21 ASSESSMENT — PAIN DESCRIPTION - LOCATION: LOCATION: BACK;SHOULDER

## 2020-05-21 NOTE — ED TRIAGE NOTES
Pt ambulates to ED via ED lobby with c/o right shoulder pain and chronic low back pain. Pt states he noticed his pain last night. Pt requesting \"pain shots\" upon arrival. Pt currently rates pain at an 8/10. Pt took tylenol at home with little releif. VSS, respirations even and unlabored.

## 2020-06-04 ENCOUNTER — HOSPITAL ENCOUNTER (EMERGENCY)
Age: 60
Discharge: HOME | End: 2020-06-04
Payer: MEDICARE

## 2020-06-04 VITALS
BODY MASS INDEX: 23.86 KG/M2 | RESPIRATION RATE: 18 BRPM | TEMPERATURE: 97.5 F | DIASTOLIC BLOOD PRESSURE: 81 MMHG | SYSTOLIC BLOOD PRESSURE: 134 MMHG | HEIGHT: 73 IN | OXYGEN SATURATION: 99 % | HEART RATE: 79 BPM | WEIGHT: 180 LBS

## 2020-06-04 PROCEDURE — 6370000000 HC RX 637 (ALT 250 FOR IP): Performed by: NURSE PRACTITIONER

## 2020-06-04 PROCEDURE — 99284 EMERGENCY DEPT VISIT MOD MDM: CPT

## 2020-06-04 PROCEDURE — 6360000002 HC RX W HCPCS: Performed by: NURSE PRACTITIONER

## 2020-06-04 PROCEDURE — 96372 THER/PROPH/DIAG INJ SC/IM: CPT

## 2020-06-04 PROCEDURE — 99283 EMERGENCY DEPT VISIT LOW MDM: CPT

## 2020-06-04 RX ORDER — TIZANIDINE 4 MG/1
4 TABLET ORAL ONCE
Status: COMPLETED | OUTPATIENT
Start: 2020-06-04 | End: 2020-06-04

## 2020-06-04 RX ORDER — KETOROLAC TROMETHAMINE 30 MG/ML
30 INJECTION, SOLUTION INTRAMUSCULAR; INTRAVENOUS ONCE
Status: COMPLETED | OUTPATIENT
Start: 2020-06-04 | End: 2020-06-04

## 2020-06-04 RX ORDER — METHYLPREDNISOLONE ACETATE 40 MG/ML
40 INJECTION, SUSPENSION INTRA-ARTICULAR; INTRALESIONAL; INTRAMUSCULAR; SOFT TISSUE ONCE
Status: COMPLETED | OUTPATIENT
Start: 2020-06-04 | End: 2020-06-04

## 2020-06-04 RX ADMIN — METHYLPREDNISOLONE ACETATE 40 MG: 40 INJECTION, SUSPENSION INTRA-ARTICULAR; INTRALESIONAL; INTRAMUSCULAR; SOFT TISSUE at 02:12

## 2020-06-04 RX ADMIN — TIZANIDINE 4 MG: 4 TABLET ORAL at 02:13

## 2020-06-04 RX ADMIN — KETOROLAC TROMETHAMINE 30 MG: 30 INJECTION, SOLUTION INTRAMUSCULAR at 02:12

## 2020-06-04 ASSESSMENT — ENCOUNTER SYMPTOMS
VOMITING: 0
BLOOD IN STOOL: 0
EYE REDNESS: 0
BACK PAIN: 1
CHEST TIGHTNESS: 0
ABDOMINAL PAIN: 0
ABDOMINAL DISTENTION: 0
DIARRHEA: 0
COLOR CHANGE: 0
SINUS PRESSURE: 0
SHORTNESS OF BREATH: 0
VOICE CHANGE: 0
COUGH: 0
RHINORRHEA: 0
CONSTIPATION: 0
WHEEZING: 0
PHOTOPHOBIA: 0
SORE THROAT: 0
NAUSEA: 0

## 2020-06-04 ASSESSMENT — PAIN DESCRIPTION - LOCATION: LOCATION: BACK

## 2020-06-04 ASSESSMENT — PAIN DESCRIPTION - ORIENTATION: ORIENTATION: LOWER

## 2020-06-04 ASSESSMENT — PAIN DESCRIPTION - PAIN TYPE: TYPE: ACUTE PAIN

## 2020-06-04 ASSESSMENT — PAIN SCALES - GENERAL: PAINLEVEL_OUTOF10: 8

## 2020-06-04 NOTE — ED PROVIDER NOTES
(NEURONTIN) 300 MG capsule Take 1 capsule by mouth every evening, Disp-15 capsule, R-0Print      albuterol sulfate  (90 BASE) MCG/ACT inhaler 2 puffs every 4-6 hours with spacer for wheezing and chest congestionwhen necessary, Disp-1 Inhaler, R-0      FLUoxetine (PROZAC) 20 MG capsule Take 20 mg by mouth daily. metoprolol (TOPROL-XL) 50 MG XL tablet Take 50 mg by mouth daily. ibuprofen (IBU) 400 MG tablet Take 1 tablet by mouth every 6 hours as needed for Pain, Disp-40 tablet, R-0Print             ALLERGIES     has No Known Allergies. FAMILY HISTORY     He indicated that his mother is alive. He indicated that his father is . family history includes Diabetes in his father and mother.     SOCIAL HISTORY       Social History     Socioeconomic History    Marital status: Single     Spouse name: Not on file    Number of children: Not on file    Years of education: Not on file    Highest education level: Not on file   Occupational History    Not on file   Social Needs    Financial resource strain: Not on file    Food insecurity     Worry: Not on file     Inability: Not on file    Transportation needs     Medical: Not on file     Non-medical: Not on file   Tobacco Use    Smoking status: Current Every Day Smoker     Packs/day: 0.25     Years: 30.00     Pack years: 7.50     Types: Cigarettes     Start date: 1983    Smokeless tobacco: Never Used   Substance and Sexual Activity    Alcohol use: Yes     Comment: occasionally    Drug use: No     Comment: hx of    Sexual activity: Yes     Partners: Female   Lifestyle    Physical activity     Days per week: Not on file     Minutes per session: Not on file    Stress: Not on file   Relationships    Social connections     Talks on phone: Not on file     Gets together: Not on file     Attends Scientologist service: Not on file     Active member of club or organization: Not on file     Attends meetings of clubs or organizations: Not on file Relationship status: Not on file    Intimate partner violence     Fear of current or ex partner: Not on file     Emotionally abused: Not on file     Physically abused: Not on file     Forced sexual activity: Not on file   Other Topics Concern    Not on file   Social History Narrative    Not on file       PHYSICAL EXAM     INITIAL VITALS:  height is 6' 1\" (1.854 m) and weight is 180 lb (81.6 kg). His oral temperature is 97.5 °F (36.4 °C). His blood pressure is 134/81 and his pulse is 79. His respiration is 18 and oxygen saturation is 99%. Physical Exam  Vitals signs and nursing note reviewed. Constitutional:       Appearance: Normal appearance. He is well-developed. HENT:      Head: Normocephalic. Right Ear: External ear normal.      Left Ear: External ear normal.      Nose: Nose normal.      Mouth/Throat:      Pharynx: Uvula midline. Eyes:      Conjunctiva/sclera: Conjunctivae normal.   Neck:      Musculoskeletal: Normal range of motion and neck supple. Cardiovascular:      Rate and Rhythm: Normal rate and regular rhythm. Pulses: Normal pulses. Heart sounds: Normal heart sounds, S1 normal and S2 normal.   Pulmonary:      Effort: Pulmonary effort is normal. No respiratory distress. Breath sounds: Normal breath sounds. No decreased air movement. No decreased breath sounds. Comments: Lung sounds clear bilaterally to auscultation. Chest:      Chest wall: No tenderness. Abdominal:      General: Bowel sounds are normal. There is no distension. Palpations: Abdomen is soft. Tenderness: There is no abdominal tenderness. Musculoskeletal: Normal range of motion. Right shoulder: He exhibits crepitus. He exhibits normal range of motion, no tenderness, no swelling, no deformity and normal strength. Left shoulder: He exhibits normal range of motion, no tenderness and no deformity. Right lower leg: No edema. Left lower leg: No edema. Comments:  The

## 2020-07-16 ENCOUNTER — HOSPITAL ENCOUNTER (OUTPATIENT)
Age: 60
Discharge: HOME | End: 2020-07-16
Payer: MEDICARE

## 2020-07-16 PROCEDURE — U0002 COVID-19 LAB TEST NON-CDC: HCPCS

## 2020-07-17 LAB
PERFORMING LAB: NORMAL
REPORT: NORMAL
SARS-COV-2: NOT DETECTED

## 2020-08-10 ENCOUNTER — HOSPITAL ENCOUNTER (OUTPATIENT)
Dept: ULTRASOUND IMAGING | Age: 60
Discharge: HOME | End: 2020-08-10
Payer: OTHER GOVERNMENT

## 2020-08-10 PROCEDURE — 76536 US EXAM OF HEAD AND NECK: CPT

## 2020-08-14 ENCOUNTER — HOSPITAL ENCOUNTER (EMERGENCY)
Age: 60
Discharge: HOME | End: 2020-08-14
Attending: EMERGENCY MEDICINE
Payer: MEDICARE

## 2020-08-14 VITALS
HEART RATE: 85 BPM | OXYGEN SATURATION: 98 % | TEMPERATURE: 98.1 F | HEIGHT: 74 IN | RESPIRATION RATE: 16 BRPM | SYSTOLIC BLOOD PRESSURE: 119 MMHG | BODY MASS INDEX: 24.38 KG/M2 | WEIGHT: 190 LBS | DIASTOLIC BLOOD PRESSURE: 79 MMHG

## 2020-08-14 PROCEDURE — 6370000000 HC RX 637 (ALT 250 FOR IP): Performed by: EMERGENCY MEDICINE

## 2020-08-14 PROCEDURE — 96372 THER/PROPH/DIAG INJ SC/IM: CPT

## 2020-08-14 PROCEDURE — 99282 EMERGENCY DEPT VISIT SF MDM: CPT

## 2020-08-14 PROCEDURE — 6360000002 HC RX W HCPCS: Performed by: EMERGENCY MEDICINE

## 2020-08-14 PROCEDURE — 99283 EMERGENCY DEPT VISIT LOW MDM: CPT

## 2020-08-14 RX ORDER — NAPROXEN 500 MG/1
500 TABLET ORAL 2 TIMES DAILY PRN
Qty: 20 TABLET | Refills: 0 | Status: SHIPPED | OUTPATIENT
Start: 2020-08-14 | End: 2020-09-11

## 2020-08-14 RX ORDER — ORPHENADRINE CITRATE 30 MG/ML
60 INJECTION INTRAMUSCULAR; INTRAVENOUS ONCE
Status: COMPLETED | OUTPATIENT
Start: 2020-08-14 | End: 2020-08-14

## 2020-08-14 RX ORDER — METHYLPREDNISOLONE 4 MG/1
TABLET ORAL
Qty: 1 KIT | Refills: 0 | Status: SHIPPED | OUTPATIENT
Start: 2020-08-14 | End: 2020-08-20

## 2020-08-14 RX ORDER — KETOROLAC TROMETHAMINE 30 MG/ML
30 INJECTION, SOLUTION INTRAMUSCULAR; INTRAVENOUS ONCE
Status: COMPLETED | OUTPATIENT
Start: 2020-08-14 | End: 2020-08-14

## 2020-08-14 RX ORDER — PREDNISONE 20 MG/1
40 TABLET ORAL ONCE
Status: COMPLETED | OUTPATIENT
Start: 2020-08-14 | End: 2020-08-14

## 2020-08-14 RX ORDER — ORPHENADRINE CITRATE 100 MG/1
100 TABLET, EXTENDED RELEASE ORAL 2 TIMES DAILY
Qty: 20 TABLET | Refills: 0 | Status: SHIPPED | OUTPATIENT
Start: 2020-08-14 | End: 2020-08-24

## 2020-08-14 RX ADMIN — ORPHENADRINE CITRATE 60 MG: 30 INJECTION INTRAMUSCULAR; INTRAVENOUS at 01:07

## 2020-08-14 RX ADMIN — KETOROLAC TROMETHAMINE 30 MG: 30 INJECTION, SOLUTION INTRAMUSCULAR at 01:08

## 2020-08-14 RX ADMIN — PREDNISONE 40 MG: 20 TABLET ORAL at 01:07

## 2020-08-14 ASSESSMENT — ENCOUNTER SYMPTOMS
ABDOMINAL DISTENTION: 0
WHEEZING: 0
EYE DISCHARGE: 0
DIARRHEA: 0
EYE PAIN: 0
EYE REDNESS: 0
SORE THROAT: 0
NAUSEA: 0
CHEST TIGHTNESS: 0
STRIDOR: 0
EYE ITCHING: 0
CONSTIPATION: 0
BACK PAIN: 1
SHORTNESS OF BREATH: 0
RHINORRHEA: 0
ABDOMINAL PAIN: 0
COUGH: 0
VOMITING: 0
PHOTOPHOBIA: 0

## 2020-08-14 ASSESSMENT — PAIN DESCRIPTION - PAIN TYPE: TYPE: CHRONIC PAIN

## 2020-08-14 ASSESSMENT — PAIN SCALES - GENERAL
PAINLEVEL_OUTOF10: 7
PAINLEVEL_OUTOF10: 7

## 2020-08-14 ASSESSMENT — PAIN DESCRIPTION - ORIENTATION: ORIENTATION: RIGHT;LEFT

## 2020-08-14 ASSESSMENT — PAIN DESCRIPTION - LOCATION: LOCATION: BACK

## 2020-08-14 ASSESSMENT — PAIN DESCRIPTION - FREQUENCY: FREQUENCY: CONTINUOUS

## 2020-08-14 ASSESSMENT — PAIN DESCRIPTION - DESCRIPTORS: DESCRIPTORS: SHARP;DULL

## 2020-08-14 NOTE — ED PROVIDER NOTES
[address] ENCOUNTER      PATIENT NAME: Rene Muhammad  MRN: [id number]  : 1960  MATA: 2020  PROVIDER: Edmund Vilchis MD      CHIEF COMPLAINT       Chief Complaint   Patient presents with    Back Pain     No new injury       Nurses Notes reviewed and I agreeexcept as noted in the HPI. HISTORY OF PRESENT ILLNESS    Rene Muhammad is a 61 y.o. male who presents to Emergency Department with Back Pain (No new injury)      Onset: Gradual  Location: Home  Severity: Moderate  Timing: last 4 days  Modifying factors: Worse on back movement  Quality: Deep bilateral lower back pain  Radiation: down to both posterior legs  Duration: Persistent  Context: Hx of chronic back pain, receiving epidural injection at [facility]  Prior episodes: frequent  Therapy today: None  Associated Symptoms: No LE weakness, no change of BM and bladder dysfunction  Additional history: None    This HPI was provided by the patient. REVIEW OF SYSTEMS     Review of Systems   Constitutional: Negative for activity change, appetite change, chills, fatigue, fever and unexpected weight change. HENT: Negative for congestion, ear discharge, ear pain, hearing loss, nosebleeds, rhinorrhea and sore throat. Eyes: Negative for photophobia, pain, discharge, redness and itching. Respiratory: Negative for cough, chest tightness, shortness of breath, wheezing and stridor. Cardiovascular: Negative for chest pain, palpitations and leg swelling. Gastrointestinal: Negative for abdominal distention, abdominal pain, constipation, diarrhea, nausea and vomiting. Endocrine: Negative for cold intolerance, heat intolerance, polydipsia and polyphagia. Genitourinary: Negative for dysuria, flank pain, frequency and hematuria. Musculoskeletal: Positive for back pain. Negative for arthralgias, gait problem, myalgias, neck pain and neck stiffness. Skin: Negative for pallor, rash and wound.    Allergic/Immunologic: Negative for environmental allergies and food allergies. Neurological: Negative for dizziness, tremors, syncope, weakness and headaches. Psychiatric/Behavioral: Negative for agitation, behavioral problems, confusion, self-injury, sleep disturbance and suicidal ideas. PAST MEDICAL HISTORY    has a past medical history of Bipolar 1 disorder ([facility] [address] COPD (chronic obstructive pulmonary disease) ([facility] [address] Hypertension, and Lumbar radiculopathy. SURGICAL HISTORY      has a past surgical history that includes Foot surgery (Right); Tonsillectomy; Colonoscopy (Left, 2019); and Upper gastrointestinal endoscopy (Left, 2019). CURRENT MEDICATIONS       Previous Medications    ACETAMINOPHEN (APAP EXTRA STRENGTH) 500 MG TABLET    Take 1 tablet by mouth every 6 hours as needed for Pain    ALBUTEROL SULFATE  (90 BASE) MCG/ACT INHALER    2 puffs every 4-6 hours with spacer for wheezing and chest congestionwhen necessary    CALCIUM POLYCARBOPHIL (FIBER) 625 MG TABS    Take 1 tablet by mouth daily    CYCLOBENZAPRINE (FLEXERIL) 10 MG TABLET    Take 1 tablet by mouth 3 times daily as needed for Muscle spasms    FLUOXETINE (PROZAC) 20 MG CAPSULE    Take 20 mg by mouth daily. GABAPENTIN (NEURONTIN) 300 MG CAPSULE    Take 1 capsule by mouth every evening    IBUPROFEN (IBU) 400 MG TABLET    Take 1 tablet by mouth every 6 hours as needed for Pain    LACTASE (LACTAID) 3000 UNITS TABLET    Take 1 tablet by mouth 3 times daily (with meals)    METOPROLOL (TOPROL-XL) 50 MG XL TABLET    Take 50 mg by mouth daily. ALLERGIES     has No Known Allergies. FAMILY HISTORY     He indicated that his mother is alive. He indicated that his father is . family history includes Diabetes in his father and mother. SOCIAL HISTORY      reports that he has been smoking cigarettes. He started smoking about 37 years ago. He has a 7.50 pack-year smoking history.  He has never used smokeless tobacco. He reports current alcohol use. He reports that he does not use drugs. PHYSICAL EXAM     INITIAL VITALS:  height is 6' 2\" (1.88 m) and weight is 190 lb (86.2 kg). His oral temperature is 98.1 °F (36.7 °C). His blood pressure is 119/79 and his pulse is 85. His respiration is 16 and oxygen saturation is 98%. Physical Exam  Vitals signs and nursing note reviewed. Constitutional:       Appearance: He is well-developed. He is not diaphoretic. HENT:      Head: Normocephalic and atraumatic. Nose: Nose normal.   Eyes:      General: No scleral icterus. Right eye: No discharge. Left eye: No discharge. Conjunctiva/sclera: Conjunctivae normal.      Pupils: Pupils are equal, round, and reactive to light. Neck:      Musculoskeletal: Normal range of motion and neck supple. Vascular: No JVD. Trachea: No tracheal deviation. Cardiovascular:      Rate and Rhythm: Normal rate and regular rhythm. Heart sounds: Normal heart sounds. No murmur. No friction rub. No gallop. Pulmonary:      Effort: Pulmonary effort is normal. No respiratory distress. Breath sounds: Normal breath sounds. No stridor. No wheezing or rales. Chest:      Chest wall: No tenderness. Abdominal:      General: Bowel sounds are normal. There is no distension. Palpations: Abdomen is soft. There is no mass. Tenderness: There is no guarding or rebound. Hernia: No hernia is present. Musculoskeletal:         General: No tenderness or deformity. Comments: Tenderness to bilateral paralumbar region   Lymphadenopathy:      Cervical: No cervical adenopathy. Skin:     General: Skin is warm and dry. Capillary Refill: Capillary refill takes less than 2 seconds. Coloration: Skin is not pale. Findings: No erythema or rash. Neurological:      Mental Status: He is alert and oriented to person, place, and time. Cranial Nerves: No cranial nerve deficit. Sensory: No sensory deficit. back pain          DISPOSITION/PLAN   Home    PATIENT REFERRED TO:  Albina WilsonDO shaila  [address] [facility] [address]  [phone]    In 3 days  ED visit follow up      DISCHARGE MEDICATIONS:  New Prescriptions    METHYLPREDNISOLONE (MEDROL, MARILY,) 4 MG TABLET    Take by mouth.     NAPROXEN (NAPROSYN) 500 MG TABLET    Take 1 tablet by mouth 2 times daily as needed for Pain    ORPHENADRINE (NORFLEX) 100 MG EXTENDED RELEASE TABLET    Take 1 tablet by mouth 2 times daily for 10 days       (Please note that portions of this note were completed with a voice recognition program.  Efforts were made to edit the dictations but occasionally words aremis-transcribed.)    MD Ajay Romero MD  08/14/20 1284

## 2020-09-03 ENCOUNTER — HOSPITAL ENCOUNTER (EMERGENCY)
Age: 60
Discharge: HOME | End: 2020-09-03
Attending: EMERGENCY MEDICINE
Payer: MEDICARE

## 2020-09-03 VITALS
HEART RATE: 70 BPM | OXYGEN SATURATION: 100 % | DIASTOLIC BLOOD PRESSURE: 100 MMHG | SYSTOLIC BLOOD PRESSURE: 147 MMHG | RESPIRATION RATE: 18 BRPM | TEMPERATURE: 98 F

## 2020-09-03 PROCEDURE — 6360000002 HC RX W HCPCS: Performed by: EMERGENCY MEDICINE

## 2020-09-03 PROCEDURE — 96372 THER/PROPH/DIAG INJ SC/IM: CPT

## 2020-09-03 PROCEDURE — 6370000000 HC RX 637 (ALT 250 FOR IP): Performed by: EMERGENCY MEDICINE

## 2020-09-03 PROCEDURE — 99281 EMR DPT VST MAYX REQ PHY/QHP: CPT

## 2020-09-03 PROCEDURE — 99282 EMERGENCY DEPT VISIT SF MDM: CPT

## 2020-09-03 RX ORDER — CYCLOBENZAPRINE HCL 10 MG
10 TABLET ORAL ONCE
Status: COMPLETED | OUTPATIENT
Start: 2020-09-03 | End: 2020-09-03

## 2020-09-03 RX ORDER — CYCLOBENZAPRINE HCL 10 MG
10 TABLET ORAL 3 TIMES DAILY PRN
Qty: 9 TABLET | Refills: 0 | Status: SHIPPED | OUTPATIENT
Start: 2020-09-03 | End: 2020-09-06

## 2020-09-03 RX ORDER — METHYLPREDNISOLONE SODIUM SUCCINATE 125 MG/2ML
60 INJECTION, POWDER, LYOPHILIZED, FOR SOLUTION INTRAMUSCULAR; INTRAVENOUS ONCE
Status: COMPLETED | OUTPATIENT
Start: 2020-09-03 | End: 2020-09-03

## 2020-09-03 RX ORDER — KETOROLAC TROMETHAMINE 30 MG/ML
30 INJECTION, SOLUTION INTRAMUSCULAR; INTRAVENOUS ONCE
Status: COMPLETED | OUTPATIENT
Start: 2020-09-03 | End: 2020-09-03

## 2020-09-03 RX ADMIN — CYCLOBENZAPRINE HYDROCHLORIDE 10 MG: 10 TABLET, FILM COATED ORAL at 02:15

## 2020-09-03 RX ADMIN — KETOROLAC TROMETHAMINE 30 MG: 30 INJECTION, SOLUTION INTRAMUSCULAR at 02:15

## 2020-09-03 RX ADMIN — METHYLPREDNISOLONE SODIUM SUCCINATE 60 MG: 125 INJECTION, POWDER, FOR SOLUTION INTRAMUSCULAR; INTRAVENOUS at 02:14

## 2020-09-03 ASSESSMENT — ENCOUNTER SYMPTOMS
COUGH: 0
TROUBLE SWALLOWING: 0
EYE PAIN: 0
ABDOMINAL PAIN: 0
WHEEZING: 0
NAUSEA: 0
VOMITING: 0
CHEST TIGHTNESS: 0
PHOTOPHOBIA: 0
EYE DISCHARGE: 0
CHOKING: 0
BLOOD IN STOOL: 0
SHORTNESS OF BREATH: 0
EYE REDNESS: 0
VOICE CHANGE: 0
CONSTIPATION: 0
DIARRHEA: 0
BACK PAIN: 1
EYE ITCHING: 0
RHINORRHEA: 0
SORE THROAT: 0
SINUS PRESSURE: 0
ABDOMINAL DISTENTION: 0

## 2020-09-03 ASSESSMENT — PAIN SCALES - GENERAL: PAINLEVEL_OUTOF10: 7

## 2020-09-03 NOTE — ED PROVIDER NOTES
[facility]  eMERGENCY dEPARTMENT eNCOUnter          CHIEF COMPLAINT       Chief Complaint   Patient presents with    Back Pain     acute on chronic       Nurses Notes reviewed and I agree except as noted in the HPI. HISTORY OF PRESENT ILLNESS    Brett Pereyra is a 61 y.o. male who presents mild exacerbation of his chronic low back pain. He is here for his nonsteroidals and steroidals. He states he is due for an epidural however when he went down and attempted to get it done they needed labs so they were going to do it that day. Currently the patient is resting comfortably on the cot no apparent distress. He has had no changes in bowel or bladder habits. He has no pain running down his legs. He has had no saddle anesthesias or paresthesias. He has had no loss of bowel or bladder function, he has no numbness or tingling into his toes. Strength is good he is not complaining of any falls. Currently the patient is resting comfortably on the cot no apparent distress. REVIEW OF SYSTEMS     Review of Systems   Constitutional: Negative for activity change, appetite change, diaphoresis, fatigue and unexpected weight change. HENT: Negative for congestion, ear discharge, ear pain, hearing loss, rhinorrhea, sinus pressure, sore throat, trouble swallowing and voice change. Eyes: Negative for photophobia, pain, discharge, redness and itching. Respiratory: Negative for cough, choking, chest tightness, shortness of breath and wheezing. Cardiovascular: Negative for chest pain, palpitations and leg swelling. Gastrointestinal: Negative for abdominal distention, abdominal pain, blood in stool, constipation, diarrhea, nausea and vomiting. Endocrine: Negative for polydipsia, polyphagia and polyuria. Genitourinary: Negative for decreased urine volume, difficulty urinating, dysuria, enuresis, frequency, hematuria and urgency. Musculoskeletal: Positive for back pain.  Negative for his mother is alive. He indicated that his father is . family history includes Diabetes in his father and mother. SOCIAL HISTORY      reports that he has been smoking cigarettes. He started smoking about 37 years ago. He has a 7.50 pack-year smoking history. He has never used smokeless tobacco. He reports current alcohol use. He reports that he does not use drugs. PHYSICAL EXAM     INITIAL VITALS:  oral temperature is 98 °F (36.7 °C). His blood pressure is 147/100 (abnormal) and his pulse is 70. His respiration is 18 and oxygen saturation is 100%. Physical Exam  Vitals signs and nursing note reviewed. Constitutional:       General: He is not in acute distress. Appearance: He is well-developed. He is not diaphoretic. HENT:      Head: Normocephalic and atraumatic. Right Ear: External ear normal.      Left Ear: External ear normal.      Nose: Nose normal.   Eyes:      General: Lids are normal. No scleral icterus. Right eye: No discharge. Left eye: No discharge. Conjunctiva/sclera: Conjunctivae normal.      Right eye: No exudate. Left eye: No exudate. Pupils: Pupils are equal, round, and reactive to light. Neck:      Musculoskeletal: Normal range of motion and neck supple. Normal range of motion. Thyroid: No thyromegaly. Vascular: No JVD. Trachea: No tracheal deviation. Cardiovascular:      Rate and Rhythm: Normal rate and regular rhythm. Pulses: Normal pulses. Heart sounds: Normal heart sounds, S1 normal and S2 normal. No murmur. No friction rub. No gallop. Pulmonary:      Effort: Pulmonary effort is normal. No respiratory distress. Breath sounds: Normal breath sounds. No stridor. No wheezing or rales. Chest:      Chest wall: No tenderness. Abdominal:      General: Bowel sounds are normal. There is no distension. Palpations: Abdomen is soft. There is no mass. Tenderness: There is no abdominal tenderness.  There appointment within the next 1 to 2 days. Patient is instructed return to the nearest emergency room immediately for any new or worsening complaints. CRITICAL CARE:   None    CONSULTS:  None    PROCEDURES:  None    FINAL IMPRESSION      1.  Acute exacerbation of chronic low back pain          DISPOSITION/PLAN   Discharge    PATIENT REFERRED TO:  Jyotsna Davila 371    Call in 1 day        DISCHARGE MEDICATIONS:  New Prescriptions    CYCLOBENZAPRINE (FLEXERIL) 10 MG TABLET    Take 1 tablet by mouth 3 times daily as needed for Muscle spasms       (Please note that portions of this note were completed with a voice recognition program.  Efforts were made to edit the dictations but occasionally words are mis-transcribed.)    Louis Sagastume, [address] [place] [address],   09/03/20 8636

## 2020-09-10 ENCOUNTER — APPOINTMENT (OUTPATIENT)
Dept: GENERAL RADIOLOGY | Age: 60
End: 2020-09-10
Payer: MEDICARE

## 2020-09-10 ENCOUNTER — HOSPITAL ENCOUNTER (EMERGENCY)
Age: 60
Discharge: HOME | End: 2020-09-10
Payer: MEDICARE

## 2020-09-10 ENCOUNTER — HOSPITAL ENCOUNTER (EMERGENCY)
Age: 60
Discharge: HOME | End: 2020-09-11
Payer: MEDICARE

## 2020-09-10 VITALS
OXYGEN SATURATION: 98 % | TEMPERATURE: 98.5 F | SYSTOLIC BLOOD PRESSURE: 136 MMHG | DIASTOLIC BLOOD PRESSURE: 84 MMHG | WEIGHT: 190 LBS | BODY MASS INDEX: 25.18 KG/M2 | HEIGHT: 73 IN | HEART RATE: 75 BPM | RESPIRATION RATE: 16 BRPM

## 2020-09-10 VITALS
OXYGEN SATURATION: 99 % | SYSTOLIC BLOOD PRESSURE: 148 MMHG | HEART RATE: 80 BPM | DIASTOLIC BLOOD PRESSURE: 93 MMHG | RESPIRATION RATE: 18 BRPM | TEMPERATURE: 97.4 F

## 2020-09-10 PROCEDURE — 99282 EMERGENCY DEPT VISIT SF MDM: CPT

## 2020-09-10 PROCEDURE — 12001 RPR S/N/AX/GEN/TRNK 2.5CM/<: CPT

## 2020-09-10 PROCEDURE — 6370000000 HC RX 637 (ALT 250 FOR IP): Performed by: NURSE PRACTITIONER

## 2020-09-10 PROCEDURE — 99281 EMR DPT VST MAYX REQ PHY/QHP: CPT

## 2020-09-10 PROCEDURE — 96372 THER/PROPH/DIAG INJ SC/IM: CPT

## 2020-09-10 PROCEDURE — 73130 X-RAY EXAM OF HAND: CPT

## 2020-09-10 RX ORDER — HYDROCODONE BITARTRATE AND ACETAMINOPHEN 5; 325 MG/1; MG/1
1 TABLET ORAL ONCE
Status: COMPLETED | OUTPATIENT
Start: 2020-09-10 | End: 2020-09-10

## 2020-09-10 RX ORDER — LIDOCAINE HYDROCHLORIDE 10 MG/ML
INJECTION, SOLUTION INFILTRATION; PERINEURAL
Status: DISCONTINUED
Start: 2020-09-10 | End: 2020-09-10 | Stop reason: HOSPADM

## 2020-09-10 RX ADMIN — HYDROCODONE BITARTRATE AND ACETAMINOPHEN 1 TABLET: 5; 325 TABLET ORAL at 16:13

## 2020-09-10 ASSESSMENT — PAIN DESCRIPTION - PAIN TYPE
TYPE: ACUTE PAIN
TYPE: ACUTE PAIN

## 2020-09-10 ASSESSMENT — PAIN SCALES - GENERAL
PAINLEVEL_OUTOF10: 5
PAINLEVEL_OUTOF10: 10
PAINLEVEL_OUTOF10: 8

## 2020-09-10 ASSESSMENT — ENCOUNTER SYMPTOMS: COLOR CHANGE: 0

## 2020-09-10 ASSESSMENT — PAIN DESCRIPTION - ORIENTATION: ORIENTATION: RIGHT

## 2020-09-10 ASSESSMENT — PAIN DESCRIPTION - LOCATION: LOCATION: FINGER (COMMENT WHICH ONE)

## 2020-09-10 NOTE — ED TRIAGE NOTES
Pt to ed with injury/pain to the second digit of the right hand after shutting it in a car door. Pt reports pain is 8/10.

## 2020-09-11 PROCEDURE — 6370000000 HC RX 637 (ALT 250 FOR IP): Performed by: NURSE PRACTITIONER

## 2020-09-11 PROCEDURE — 6360000002 HC RX W HCPCS: Performed by: NURSE PRACTITIONER

## 2020-09-11 RX ORDER — KETOROLAC TROMETHAMINE 30 MG/ML
30 INJECTION, SOLUTION INTRAMUSCULAR; INTRAVENOUS ONCE
Status: COMPLETED | OUTPATIENT
Start: 2020-09-11 | End: 2020-09-11

## 2020-09-11 RX ORDER — KETOROLAC TROMETHAMINE 10 MG/1
10 TABLET, FILM COATED ORAL EVERY 6 HOURS PRN
Qty: 10 TABLET | Refills: 0 | Status: SHIPPED | OUTPATIENT
Start: 2020-09-11 | End: 2021-07-08

## 2020-09-11 RX ORDER — HYDROCODONE BITARTRATE AND ACETAMINOPHEN 5; 325 MG/1; MG/1
1 TABLET ORAL ONCE
Status: COMPLETED | OUTPATIENT
Start: 2020-09-11 | End: 2020-09-11

## 2020-09-11 RX ADMIN — KETOROLAC TROMETHAMINE 30 MG: 30 INJECTION, SOLUTION INTRAMUSCULAR at 00:30

## 2020-09-11 RX ADMIN — HYDROCODONE BITARTRATE AND ACETAMINOPHEN 1 TABLET: 5; 325 TABLET ORAL at 00:30

## 2020-09-11 NOTE — ED PROVIDER NOTES
[facility] Emergency Department    CHIEF COMPLAINT       Chief Complaint   Patient presents with    Finger Pain     laceration        Nurses Notes reviewed and I agree except as noted in the HPI. HISTORY OF PRESENT ILLNESS    Bonita Haro loki 61 y.o. male who presents to the ED for evaluation of finger pain. Patient states he was at work at [employer], he closed the door on his finger, he notes pain to the right index finger. He denies numbness or tingling, denies any decreased range of motion. He denies injury to this finger before in the past.  He notes his tetanus was last completed in about 2 to 3 years ago. He denies any significant medical problems. HPI was provided by the patient. REVIEW OF SYSTEMS     Review of Systems   Musculoskeletal: Negative for arthralgias and joint swelling. Skin: Positive for wound. Negative for color change. Allergic/Immunologic: Negative for immunocompromised state. Neurological: Negative for weakness and numbness. Hematological: Does not bruise/bleed easily. Psychiatric/Behavioral: Negative for agitation, behavioral problems and confusion. PAST MEDICAL HISTORY     Past Medical History:   Diagnosis Date    Bipolar 1 disorder ([facility]     COPD (chronic obstructive pulmonary disease) ([facility] [address] [facility]     Hypertension     Lumbar radiculopathy        SURGICALHISTORY      has a past surgical history that includes Foot surgery (Right); Tonsillectomy; Colonoscopy (Left, 6/21/2019); and Upper gastrointestinal endoscopy (Left, 6/21/2019).     CURRENT MEDICATIONS       Discharge Medication List as of 9/10/2020  4:00 PM      CONTINUE these medications which have NOT CHANGED    Details   naproxen (NAPROSYN) 500 MG tablet Take 1 tablet by mouth 2 times daily as needed for Pain, Disp-20 tablet,R-0Print      ibuprofen (IBU) 400 MG tablet Take 1 tablet by mouth every 6 hours as needed for Pain, Disp-40 tablet, R-0Print      lactase (LACTAID) 3000 units tablet Take 1 tablet week: Not on file     Minutes per session: Not on file    Stress: Not on file   Relationships    Social connections     Talks on phone: Not on file     Gets together: Not on file     Attends Jain service: Not on file     Active member of club or organization: Not on file     Attends meetings of clubs or organizations: Not on file     Relationship status: Not on file    Intimate partner violence     Fear of current or ex partner: Not on file     Emotionally abused: Not on file     Physically abused: Not on file     Forced sexual activity: Not on file   Other Topics Concern    Not on file   Social History Narrative    Not on file       PHYSICAL EXAM     INITIAL VITALS:  height is 6' 1\" (1.854 m) and weight is 190 lb (86.2 kg). His oral temperature is 98.5 °F (36.9 °C). His blood pressure is 136/84 and his pulse is 75. His respiration is 16 and oxygen saturation is 98%. Physical Exam  Constitutional:       Appearance: Normal appearance. He is normal weight. He is not toxic-appearing. HENT:      Head: Normocephalic. Cardiovascular:      Rate and Rhythm: Normal rate. Pulmonary:      Effort: Pulmonary effort is normal.   Musculoskeletal:      Right hand: He exhibits tenderness. He exhibits normal range of motion and normal capillary refill. Comments: 2cm laceration to volar service of right index finger. Skin:     Capillary Refill: Capillary refill takes less than 2 seconds. Neurological:      General: No focal deficit present. Mental Status: He is alert and oriented to person, place, and time. Sensory: No sensory deficit. Motor: No weakness. Psychiatric:         Mood and Affect: Mood normal.         Behavior: Behavior normal.         Thought Content:  Thought content normal.           DIFFERENTIAL DIAGNOSIS:   Open fracture, laceration, abrasion     DIAGNOSTIC RESULTS       RADIOLOGY: non-plainfilm images(s) such as CT, Ultrasound and MRI are read by the radiologist.  Elier Cotter radiographic images are visualized and preliminarily interpreted by the emergency physician unless otherwise stated below. XR HAND RIGHT (MIN 3 VIEWS)   Final Result   No acute osseous finding. Soft tissue laceration distal second digit. **This report has been created using voice recognition software. It may contain minor errors which are inherent in voice recognition technology. **      Final report electronically signed by Dr. Rebeka Shields on 9/10/2020 3:38 PM            LABS:   Labs Reviewed - No data to display    EMERGENCY DEPARTMENT COURSE:   Vitals:    Vitals:    09/10/20 1455   BP: 136/84   Pulse: 75   Resp: 16   Temp: 98.5 °F (36.9 °C)   TempSrc: Oral   SpO2: 98%   Weight: 190 lb (86.2 kg)   Height: 6' 1\" (1.854 m)       MDM    Patient was seen and evalutated in the emergency department, patient appears to be in no distress. Vital signs were reviewed, no significant findings were noted. Imaging was obtained, no fracture was noted. Wound was repaired with sutures see below. Discussed my findings with patient he is amenable with discharge. He requests norco before discharge. Advised to follow up with pcp for suture removal.   Medications   HYDROcodone-acetaminophen (NORCO) 5-325 MG per tablet 1 tablet (1 tablet Oral Given 9/10/20 1613)       Patient was seenindependently by myself. The patient's final impression and disposition and plan was determined by myself. CRITICAL CARE:   None    CONSULTS:  None    PROCEDURES:  Lac Repair    Date/Time: 9/10/2020 4:00 PM  Performed by: Melvia Landau Counts, APRN - CNP  Authorized by: Melvia Landau Counts, APRN - CNP     Consent:     Consent obtained:  Verbal    Consent given by:  Patient    Risks discussed:  Infection, pain, need for additional repair, nerve damage, poor wound healing, vascular damage and tendon damage    Alternatives discussed:  No treatment and delayed treatment  Anesthesia (see MAR for exact dosages):      Anesthesia method:  Nerve block    Block location: the services described in the documentation,reviewed and edited the documentation which was dictated to the scribe in my presence, and it accurately records my words and actions.     Juice Han, KT 09/10/20 10:16 PM    Tianna Han, APRN - CNP        Lawdingo, FRANK - CNP  09/10/20 7140

## 2020-09-11 NOTE — ED TRIAGE NOTES
Pt presents to the ED with complaints of right 3 finger pain. Pt was seen in ED earlier today and has stitches placed. Pt states recieved a Norco in ED and now pain is 10/10 and throbbing. Pt has splint on third finger. Pt is concerned he slept on it wrong. Pt is requesting a work slip for tomorrow.

## 2020-09-14 ENCOUNTER — HOSPITAL ENCOUNTER (EMERGENCY)
Age: 60
Discharge: HOME | End: 2020-09-14
Attending: EMERGENCY MEDICINE
Payer: OTHER GOVERNMENT

## 2020-09-14 VITALS
HEART RATE: 68 BPM | DIASTOLIC BLOOD PRESSURE: 81 MMHG | TEMPERATURE: 97.7 F | BODY MASS INDEX: 25.41 KG/M2 | SYSTOLIC BLOOD PRESSURE: 138 MMHG | RESPIRATION RATE: 20 BRPM | OXYGEN SATURATION: 99 % | HEIGHT: 74 IN | WEIGHT: 198 LBS

## 2020-09-14 PROCEDURE — 99283 EMERGENCY DEPT VISIT LOW MDM: CPT

## 2020-09-14 PROCEDURE — 96374 THER/PROPH/DIAG INJ IV PUSH: CPT

## 2020-09-14 PROCEDURE — 99282 EMERGENCY DEPT VISIT SF MDM: CPT

## 2020-09-14 PROCEDURE — 96372 THER/PROPH/DIAG INJ SC/IM: CPT

## 2020-09-14 PROCEDURE — 6360000002 HC RX W HCPCS: Performed by: STUDENT IN AN ORGANIZED HEALTH CARE EDUCATION/TRAINING PROGRAM

## 2020-09-14 RX ORDER — METHYLPREDNISOLONE SODIUM SUCCINATE 125 MG/2ML
60 INJECTION, POWDER, LYOPHILIZED, FOR SOLUTION INTRAMUSCULAR; INTRAVENOUS ONCE
Status: COMPLETED | OUTPATIENT
Start: 2020-09-14 | End: 2020-09-14

## 2020-09-14 RX ORDER — KETOROLAC TROMETHAMINE 30 MG/ML
30 INJECTION, SOLUTION INTRAMUSCULAR; INTRAVENOUS ONCE
Status: COMPLETED | OUTPATIENT
Start: 2020-09-14 | End: 2020-09-14

## 2020-09-14 RX ADMIN — METHYLPREDNISOLONE SODIUM SUCCINATE 60 MG: 125 INJECTION, POWDER, FOR SOLUTION INTRAMUSCULAR; INTRAVENOUS at 02:47

## 2020-09-14 RX ADMIN — KETOROLAC TROMETHAMINE 30 MG: 30 INJECTION, SOLUTION INTRAMUSCULAR at 02:47

## 2020-09-14 ASSESSMENT — ENCOUNTER SYMPTOMS
EYE REDNESS: 0
BACK PAIN: 1
RHINORRHEA: 0
COUGH: 0
VOMITING: 0
SORE THROAT: 0
ABDOMINAL PAIN: 0
SHORTNESS OF BREATH: 0
DIARRHEA: 0
SINUS PAIN: 0
NAUSEA: 0

## 2020-09-14 ASSESSMENT — PAIN DESCRIPTION - PAIN TYPE: TYPE: ACUTE PAIN

## 2020-09-14 ASSESSMENT — PAIN DESCRIPTION - LOCATION: LOCATION: BACK

## 2020-09-14 ASSESSMENT — PAIN SCALES - GENERAL
PAINLEVEL_OUTOF10: 10
PAINLEVEL_OUTOF10: 10

## 2020-09-14 ASSESSMENT — PAIN DESCRIPTION - DESCRIPTORS: DESCRIPTORS: ACHING

## 2020-09-14 NOTE — ED TRIAGE NOTES
Pt presents to the ED from home c/o lower back pain. Pt states he was seen here a couple of days ago. Pt states \" I just need a Toradol shot\". Pt respirations easy and unlabored. Pt denies any further needs at this time.

## 2020-09-14 NOTE — LETTER
[facility] Emergency Department   [place], [address]          PROOF OF PRESENCE      To Whom It May Concern:    Juan Carlos Tapia was present in the Emergency Department at [facility] Emergency Department on 9/14/20.                                      Sincerely,        [id number] [id number]

## 2020-09-14 NOTE — ED PROVIDER NOTES
[facility] ENCOUNTER          Pt Name: Beto Walters  MRN: [id number]  [place] 1960  Date of evaluation: 9/14/2020  Treating Resident Physician: Abel Ho MD  Supervising Physician: Dr. Cesilia Hoffman       Chief Complaint   Patient presents with    Back Pain     History obtained from the patient. HISTORY OF PRESENT ILLNESS    HPI  Beto Walters is a 61 y.o. male with a history of multiple visits to this emergency department  who presents to the emergency department for evaluation of acute flareup of his chronic back pain that began 3 days ago. He also mentions having his right index finger sutured 3 days ago for wound after he comes of the knife accidentally that he wants to be reevaluated. The patient has no other acute complaints at this time. REVIEW OF SYSTEMS   Review of Systems   Constitutional: Negative for chills and fever. HENT: Negative for rhinorrhea, sinus pain and sore throat. Eyes: Negative for redness. Respiratory: Negative for cough and shortness of breath. Cardiovascular: Negative for chest pain. Gastrointestinal: Negative for abdominal pain, diarrhea, nausea and vomiting. Genitourinary: Negative for dysuria. Musculoskeletal: Positive for back pain. Skin: Positive for wound. Negative for rash. Neurological: Negative for light-headedness and headaches. Psychiatric/Behavioral: Negative for agitation.          PAST MEDICAL AND SURGICAL HISTORY     Past Medical History:   Diagnosis Date    Bipolar 1 disorder ([facility]     COPD (chronic obstructive pulmonary disease) ([facility]     Hypertension     Lumbar radiculopathy      Past Surgical History:   Procedure Laterality Date    COLONOSCOPY Left 6/21/2019    COLONOSCOPY DIAGNOSTIC [facility] Prem [facility] at [facility] Right     TONSILLECTOMY      UPPER GASTROINTESTINAL ENDOSCOPY Left 6/21/2019    EGD BIOPSY performed by Ray Bravo MD at [facility]         MEDICATIONS     Current Facility-Administered Medications:     ketorolac (TORADOL) injection 30 mg, 30 mg, Intramuscular, Once, Kimberly Yeung MD    methylPREDNISolone sodium (SOLU-MEDROL) injection 60 mg, 60 mg, Intravenous, Once, Kimberly Yeung MD    Current Outpatient Medications:     ketorolac (TORADOL) 10 MG tablet, Take 1 tablet by mouth every 6 hours as needed for Pain, Disp: 10 tablet, Rfl: 0    ibuprofen (IBU) 400 MG tablet, Take 1 tablet by mouth every 6 hours as needed for Pain, Disp: 40 tablet, Rfl: 0    lactase (LACTAID) 3000 units tablet, Take 1 tablet by mouth 3 times daily (with meals), Disp: 30 tablet, Rfl: 0    Calcium Polycarbophil (FIBER) 625 MG TABS, Take 1 tablet by mouth daily, Disp: 14 tablet, Rfl: 0    cyclobenzaprine (FLEXERIL) 10 MG tablet, Take 1 tablet by mouth 3 times daily as needed for Muscle spasms, Disp: 12 tablet, Rfl: 0    acetaminophen (APAP EXTRA STRENGTH) 500 MG tablet, Take 1 tablet by mouth every 6 hours as needed for Pain, Disp: 120 tablet, Rfl: 0    gabapentin (NEURONTIN) 300 MG capsule, Take 1 capsule by mouth every evening, Disp: 15 capsule, Rfl: 0    albuterol sulfate  (90 BASE) MCG/ACT inhaler, 2 puffs every 4-6 hours with spacer for wheezing and chest congestionwhen necessary, Disp: 1 Inhaler, Rfl: 0    FLUoxetine (PROZAC) 20 MG capsule, Take 20 mg by mouth daily. , Disp: , Rfl:     metoprolol (TOPROL-XL) 50 MG XL tablet, Take 50 mg by mouth daily. , Disp: , Rfl:       SOCIAL HISTORY     Social History     Social History Narrative    Not on file     Social History     Tobacco Use    Smoking status: Current Every Day Smoker     Packs/day: 0.25     Years: 30.00     Pack years: 7.50     Types: Cigarettes     Start date: 4/27/1983    Smokeless tobacco: Never Used   Substance Use Topics    Alcohol use: Yes     Comment: occasionally    Drug use: No     Comment: hx of ALLERGIES   No Known Allergies      FAMILY HISTORY     Family History   Problem Relation Age of Onset    Diabetes Mother    Kristen Ireland Diabetes Father          PREVIOUS RECORDS   Previous records reviewed: Patient was seen here on 9/10/2020 for wound check. PHYSICAL EXAM     ED Triage Vitals [09/14/20 0225]   BP Temp Temp Source Pulse Resp SpO2 Height Weight   138/81 97.7 °F (36.5 °C) Oral 68 20 99 % 6' 2\" (1.88 m) 198 lb (89.8 kg)     Initial vital signs and nursing assessment reviewed and normal. Pulsoximetry is normal per my interpretation. Additional Vital Signs:  Vitals:    09/14/20 0225   BP: 138/81   Pulse: 68   Resp: 20   Temp: 97.7 °F (36.5 °C)   SpO2: 99%       Physical Exam  Vitals signs and nursing note reviewed. Constitutional:       General: He is not in acute distress. Appearance: Normal appearance. He is not ill-appearing or toxic-appearing. HENT:      Head: Normocephalic and atraumatic. Right Ear: External ear normal.      Left Ear: External ear normal.      Nose: Nose normal.      Mouth/Throat:      Mouth: Mucous membranes are moist.      Pharynx: Oropharynx is clear. Eyes:      Conjunctiva/sclera: Conjunctivae normal.   Neck:      Musculoskeletal: Normal range of motion and neck supple. Cardiovascular:      Rate and Rhythm: Normal rate and regular rhythm. Pulses: Normal pulses. Heart sounds: Normal heart sounds. Pulmonary:      Effort: Pulmonary effort is normal.      Breath sounds: Normal breath sounds. Musculoskeletal: Normal range of motion. Comments: Gait is normal, range of motion is intact, no midline tenderness step-offs or deformities noted. Some paraspinal muscle tenderness noted   Skin:     General: Skin is warm. Capillary Refill: Capillary refill takes less than 2 seconds. Comments: Patient has a well-appearing sutured laceration of his right index finger approximately 2 cm with no surrounding erythema or fluctuance.    Neurological: General: No focal deficit present. Mental Status: He is alert and oriented to person, place, and time. Psychiatric:         Mood and Affect: Mood normal.             MEDICAL DECISION MAKING   Initial Assessment: This is a 60-year-old male with a history of chronic back pain presenting with acute flareup over the past 3 days. He also wants his right index finger reevaluated for a wound check. Plan: We will give patient Solu-Medrol shot as well as a Toradol shot since this is worked well for him in the past.  Wound was checked and is healing nicely patient will follow-up with his primary care physician a few days to have the sutures removed. He also mentions needing a work note this was provided. And he will be discharged home once he receives his medications. ED RESULTS   Laboratory results:  Labs Reviewed - No data to display    Radiologic studies results:  No orders to display       ED Medications administered this visit:   Medications   ketorolac (TORADOL) injection 30 mg (has no administration in time range)   methylPREDNISolone sodium (SOLU-MEDROL) injection 60 mg (has no administration in time range)         ED COURSE      Strict return precautions and follow up instructions were discussed with the patient prior to discharge, with which the patient agrees. MEDICATION CHANGES     New Prescriptions    No medications on file         FINAL DISPOSITION     Final diagnoses:   Acute exacerbation of chronic low back pain     Condition: condition: good  Dispo: Discharge to home      This transcription was electronically signed. Parts of this transcriptions may have been dictated by use of voice recognition software and electronically transcribed, and parts may have been transcribed with the assistance of an ED scribe. The transcription may contain errors not detected in proofreading. Please refer to my supervising physician's documentation if my documentation differs.     Electronically

## 2020-09-14 NOTE — ED PROVIDER NOTES
Physician Note:    I have personally performed and participated in the history, exam and medical decision making and agree with all pertinent clinical information. I have also reviewed and agree with the past medical, family and social history unless otherwise noted. Evaluation:      2:37 AM EDT: The patient was evaluated. Yayo Rosario is a 61 y.o. male who presents to the Emergency Department for the evaluation of chronic back pain. Patient is here for his usual NSAIDs and steroidals. No imaging was performed here. Patient was given his injections. Patient did well. Patient was subsequently discharged home in stable condition. FINAL IMPRESSION      1. Acute exacerbation of chronic low back pain        I saw this patient with Dr. Elli Salvador; I agree with his assessment and plan.       Jonathon Cameron DO 6:59 AM          Jonathon Cameron DO  09/14/20 5741

## 2020-09-15 NOTE — ED PROVIDER NOTES
every 6 hours as needed for Pain, Disp-120 tablet, R-0Print      gabapentin (NEURONTIN) 300 MG capsule Take 1 capsule by mouth every evening, Disp-15 capsule, R-0Print      albuterol sulfate  (90 BASE) MCG/ACT inhaler 2 puffs every 4-6 hours with spacer for wheezing and chest congestionwhen necessary, Disp-1 Inhaler, R-0      FLUoxetine (PROZAC) 20 MG capsule Take 20 mg by mouth daily. metoprolol (TOPROL-XL) 50 MG XL tablet Take 50 mg by mouth daily. ALLERGIES     has No Known Allergies. FAMILY HISTORY     He indicated that his mother is alive. He indicated that his father is . family history includes Diabetes in his father and mother.     SOCIAL HISTORY       Social History     Socioeconomic History    Marital status: Single     Spouse name: Not on file    Number of children: Not on file    Years of education: Not on file    Highest education level: Not on file   Occupational History    Not on file   Social Needs    Financial resource strain: Not on file    Food insecurity     Worry: Not on file     Inability: Not on file    Transportation needs     Medical: Not on file     Non-medical: Not on file   Tobacco Use    Smoking status: Current Every Day Smoker     Packs/day: 0.25     Years: 30.00     Pack years: 7.50     Types: Cigarettes     Start date: 1983    Smokeless tobacco: Never Used   Substance and Sexual Activity    Alcohol use: Yes     Comment: occasionally    Drug use: No     Comment: hx of    Sexual activity: Yes     Partners: Female   Lifestyle    Physical activity     Days per week: Not on file     Minutes per session: Not on file    Stress: Not on file   Relationships    Social connections     Talks on phone: Not on file     Gets together: Not on file     Attends Religion service: Not on file     Active member of club or organization: Not on file     Attends meetings of clubs or organizations: Not on file     Relationship status: Not on file   [facility] Intimate partner violence     Fear of current or ex partner: Not on file     Emotionally abused: Not on file     Physically abused: Not on file     Forced sexual activity: Not on file   Other Topics Concern    Not on file   Social History Narrative    Not on file       PHYSICAL EXAM     INITIAL VITALS:  oral temperature is 97.4 °F (36.3 °C). His blood pressure is 148/93 (abnormal) and his pulse is 80. His respiration is 18 and oxygen saturation is 99%. Physical Exam  Constitutional:       Appearance: Normal appearance. He is well-developed and normal weight. HENT:      Head: Normocephalic and atraumatic. Eyes:      Conjunctiva/sclera: Conjunctivae normal.   Cardiovascular:      Rate and Rhythm: Normal rate. Pulmonary:      Effort: Pulmonary effort is normal.   Abdominal:      Palpations: Abdomen is soft. Musculoskeletal:      Comments: The third digit of the right hand is wrapped in a splint. There is sutures in place. Wound is clean dry and intact. No excessive swelling. No exudate is noted. Compartments are soft. She has good range of motion. Skin:     General: Skin is warm and dry. Capillary Refill: Capillary refill takes less than 2 seconds. Neurological:      Mental Status: He is alert and oriented to person, place, and time. Psychiatric:         Behavior: Behavior normal.         DIFFERENTIAL DIAGNOSIS:   Wound check, pain    DIAGNOSTIC RESULTS     EKG: All EKG's are interpreted by the Emergency Department Physician who eithersigns or Co-signs this chart in the absence of a cardiologist.        RADIOLOGY: non-plainfilm images(s) such as CT, Ultrasound and MRI are read by the radiologist.  Plain radiographic images are visualized and preliminarily interpreted by the emergency physician unless otherwise stated below.   No orders to display         LABS:   Labs Reviewed - No data to display    EMERGENCY DEPARTMENT COURSE:   Vitals:    Vitals:    09/10/20 2339   BP: (!) 148/93   Pulse: 80   Resp: 18   Temp: 97.4 °F (36.3 °C)   TempSrc: Oral   SpO2: 99%         MDM                           Medications   HYDROcodone-acetaminophen (NORCO) 5-325 MG per tablet 1 tablet (1 tablet Oral Given 9/11/20 0030)   ketorolac (TORADOL) injection 30 mg (30 mg Intramuscular Given 9/11/20 0030)     Patient was seen and evaluated in the emergency department, patient appeared to be in stable condition. Vital signs assessed, no abnormality noted. Physical exam completed. Patient has a wound on the finger that was sutured today. It is clean dry and intact and looks well. The patient complains of pain. I do not have any concern for infection or compartment syndrome. Patient is treated with a [place] and Toradol. Patient is under pain contract therefore he cannot be discharged home with with narcotics. Patient is explained this. He is discharged home with a prescription for Toradol. Patient was seenindependently by myself. The patient's final impression and disposition and plan was determined by myself. CRITICAL CARE:   None    CONSULTS:  None    PROCEDURES:  None    FINAL IMPRESSION     1.  Visit for wound check          DISPOSITION/PLAN   DISPOSITION Decision To Discharge 09/11/2020 12:07:36 AM      PATIENT REFERREDTO:  Riky Alberto, DO  [address] [place] [address] [phone]    Schedule an appointment as soon as possible for a visit in 3 days  For wound re-check      DISCHARGE MEDICATIONS:  Discharge Medication List as of 9/11/2020 12:33 AM      START taking these medications    Details   ketorolac (TORADOL) 10 MG tablet Take 1 tablet by mouth every 6 hours as needed for Pain, Disp-10 tablet,R-0Print             (Please note that portions of this note were completed with a voice recognition program.  Efforts were made to edit the dictations but occasionally words are mis-transcribed.)         FRANK Gibbs - FRANK Byrd CNP  09/15/20 1705

## 2020-10-01 ENCOUNTER — HOSPITAL ENCOUNTER (EMERGENCY)
Age: 60
Discharge: HOME | End: 2020-10-01
Attending: EMERGENCY MEDICINE
Payer: OTHER GOVERNMENT

## 2020-10-01 VITALS
SYSTOLIC BLOOD PRESSURE: 126 MMHG | HEART RATE: 89 BPM | OXYGEN SATURATION: 99 % | TEMPERATURE: 98.8 F | DIASTOLIC BLOOD PRESSURE: 89 MMHG | RESPIRATION RATE: 16 BRPM

## 2020-10-01 PROCEDURE — 6370000000 HC RX 637 (ALT 250 FOR IP)

## 2020-10-01 PROCEDURE — 99283 EMERGENCY DEPT VISIT LOW MDM: CPT

## 2020-10-01 PROCEDURE — 96372 THER/PROPH/DIAG INJ SC/IM: CPT

## 2020-10-01 PROCEDURE — 99282 EMERGENCY DEPT VISIT SF MDM: CPT

## 2020-10-01 PROCEDURE — 6360000002 HC RX W HCPCS: Performed by: EMERGENCY MEDICINE

## 2020-10-01 RX ORDER — BACITRACIN, NEOMYCIN, POLYMYXIN B 400; 3.5; 5 [USP'U]/G; MG/G; [USP'U]/G
OINTMENT TOPICAL
Status: COMPLETED
Start: 2020-10-01 | End: 2020-10-01

## 2020-10-01 RX ORDER — BACITRACIN, NEOMYCIN, POLYMYXIN B 400; 3.5; 5 [USP'U]/G; MG/G; [USP'U]/G
OINTMENT TOPICAL 2 TIMES DAILY
Status: DISCONTINUED | OUTPATIENT
Start: 2020-10-01 | End: 2020-10-01

## 2020-10-01 RX ORDER — KETOROLAC TROMETHAMINE 30 MG/ML
30 INJECTION, SOLUTION INTRAMUSCULAR; INTRAVENOUS ONCE
Status: COMPLETED | OUTPATIENT
Start: 2020-10-01 | End: 2020-10-01

## 2020-10-01 RX ORDER — BACITRACIN, NEOMYCIN, POLYMYXIN B 400; 3.5; 5 [USP'U]/G; MG/G; [USP'U]/G
OINTMENT TOPICAL ONCE
Status: COMPLETED | OUTPATIENT
Start: 2020-10-01 | End: 2020-10-01

## 2020-10-01 RX ADMIN — KETOROLAC TROMETHAMINE 30 MG: 30 INJECTION, SOLUTION INTRAMUSCULAR; INTRAVENOUS at 02:30

## 2020-10-01 RX ADMIN — BACITRACIN, NEOMYCIN, POLYMYXIN B: 400; 3.5; 5 OINTMENT TOPICAL at 02:32

## 2020-10-01 RX ADMIN — BACITRACIN ZINC, POLYMYXIN B SULFATE, NEOMYCIN SULFATE: 400; 5000; 3.5 OINTMENT TOPICAL at 02:32

## 2020-10-01 ASSESSMENT — ENCOUNTER SYMPTOMS
EYE REDNESS: 0
NAUSEA: 0
SHORTNESS OF BREATH: 0
ABDOMINAL PAIN: 0
BLOOD IN STOOL: 0
TROUBLE SWALLOWING: 0
DIARRHEA: 0
EYE ITCHING: 0
WHEEZING: 0
CHOKING: 0
BACK PAIN: 0
RHINORRHEA: 0
PHOTOPHOBIA: 0
VOMITING: 0
SINUS PRESSURE: 0
CHEST TIGHTNESS: 0
VOICE CHANGE: 0
ABDOMINAL DISTENTION: 0
EYE DISCHARGE: 0
EYE PAIN: 0
CONSTIPATION: 0
COUGH: 0
SORE THROAT: 0

## 2020-10-01 ASSESSMENT — PAIN DESCRIPTION - PAIN TYPE: TYPE: ACUTE PAIN

## 2020-10-01 ASSESSMENT — PAIN DESCRIPTION - LOCATION: LOCATION: HAND

## 2020-10-01 ASSESSMENT — PAIN DESCRIPTION - ORIENTATION: ORIENTATION: RIGHT

## 2020-10-01 ASSESSMENT — PAIN SCALES - GENERAL: PAINLEVEL_OUTOF10: 5

## 2020-10-01 NOTE — ED PROVIDER NOTES
[facility]  eMERGENCY dEPARTMENT eNCOUnter          [address] [facility]       Chief Complaint   Patient presents with    Suture / Staple Removal       Nurses Notes reviewed and I agree except as noted in the HPI. HISTORY OF PRESENT ILLNESS    Cata Morrow is a 61 y.o. male who presents for suture removal.  Apparently a week ago he got his finger caught in a car door. It is time for the sutures to come out. He came into emergency room for removal of sutures. Patient is not having any other physical complaints at this time. Patient also wants a shot of Toradol. REVIEW OF SYSTEMS     Review of Systems   Constitutional: Negative for activity change, appetite change, diaphoresis, fatigue and unexpected weight change. HENT: Negative for congestion, ear discharge, ear pain, hearing loss, rhinorrhea, sinus pressure, sore throat, trouble swallowing and voice change. Eyes: Negative for photophobia, pain, discharge, redness and itching. Respiratory: Negative for cough, choking, chest tightness, shortness of breath and wheezing. Cardiovascular: Negative for chest pain, palpitations and leg swelling. Gastrointestinal: Negative for abdominal distention, abdominal pain, blood in stool, constipation, diarrhea, nausea and vomiting. Endocrine: Negative for polydipsia, polyphagia and polyuria. Genitourinary: Negative for decreased urine volume, difficulty urinating, dysuria, enuresis, frequency, hematuria and urgency. Musculoskeletal: Negative for arthralgias, back pain, gait problem, myalgias, neck pain and neck stiffness. Skin: Positive for wound (9 nonabsorbable sutures in index fingertip). Negative for pallor and rash. Allergic/Immunologic: Negative for immunocompromised state. Neurological: Negative for dizziness, tremors, seizures, syncope, facial asymmetry, weakness, light-headedness, numbness and headaches. Hematological: Negative for adenopathy.  Does not bruise/bleed easily. Psychiatric/Behavioral: Negative for agitation, hallucinations and suicidal ideas. The patient is not nervous/anxious. PAST MEDICAL HISTORY    has a past medical history of Bipolar 1 disorder ([address] COPD (chronic obstructive pulmonary disease) ([address] Hypertension, and Lumbar radiculopathy. SURGICAL HISTORY      has a past surgical history that includes Foot surgery (Right); Tonsillectomy; Colonoscopy (Left, 2019); and Upper gastrointestinal endoscopy (Left, 2019). CURRENT MEDICATIONS       Previous Medications    ACETAMINOPHEN (APAP EXTRA STRENGTH) 500 MG TABLET    Take 1 tablet by mouth every 6 hours as needed for Pain    ALBUTEROL SULFATE  (90 BASE) MCG/ACT INHALER    2 puffs every 4-6 hours with spacer for wheezing and chest congestionwhen necessary    CALCIUM POLYCARBOPHIL (FIBER) 625 MG TABS    Take 1 tablet by mouth daily    CYCLOBENZAPRINE (FLEXERIL) 10 MG TABLET    Take 1 tablet by mouth 3 times daily as needed for Muscle spasms    FLUOXETINE (PROZAC) 20 MG CAPSULE    Take 20 mg by mouth daily. GABAPENTIN (NEURONTIN) 300 MG CAPSULE    Take 1 capsule by mouth every evening    IBUPROFEN (IBU) 400 MG TABLET    Take 1 tablet by mouth every 6 hours as needed for Pain    KETOROLAC (TORADOL) 10 MG TABLET    Take 1 tablet by mouth every 6 hours as needed for Pain    LACTASE (LACTAID) 3000 UNITS TABLET    Take 1 tablet by mouth 3 times daily (with meals)    METOPROLOL (TOPROL-XL) 50 MG XL TABLET    Take 50 mg by mouth daily. ALLERGIES     has No Known Allergies. FAMILY HISTORY     He indicated that his mother is alive. He indicated that his father is . family history includes Diabetes in his father and mother. SOCIAL HISTORY      reports that he has been smoking cigarettes. He started smoking about 37 years ago. He has a 7.50 pack-year smoking history. He has never used smokeless tobacco. He reports current alcohol use.  He reports that he does not use drugs.    PHYSICAL EXAM     INITIAL VITALS:  oral temperature is 98.8 °F (37.1 °C). His blood pressure is 126/89 and his pulse is 89. His respiration is 16 and oxygen saturation is 99%. Physical Exam  Constitutional:       General: He is not in acute distress. Appearance: Normal appearance. HENT:      Head: Normocephalic and atraumatic. Nose: Nose normal.   Eyes:      Extraocular Movements: Extraocular movements intact. Conjunctiva/sclera: Conjunctivae normal.      Pupils: Pupils are equal, round, and reactive to light. Neck:      Musculoskeletal: Normal range of motion. Cardiovascular:      Rate and Rhythm: Normal rate and regular rhythm. Pulmonary:      Effort: Pulmonary effort is normal.      Breath sounds: Normal breath sounds. Abdominal:      General: Abdomen is flat. Palpations: Abdomen is soft. Musculoskeletal: Normal range of motion. Right lower leg: No edema. Left lower leg: No edema. Skin:     General: Skin is warm. Capillary Refill: Capillary refill takes less than 2 seconds. Neurological:      General: No focal deficit present. Mental Status: He is alert and oriented to person, place, and time. Mental status is at baseline. DIFFERENTIAL DIAGNOSIS:   Frontal diagnosis discussed extensively at bedside with the patient including but not limited to suture removal, infected finger    DIAGNOSTIC RESULTS     EKG: All EKG's are interpreted by the Emergency Department Physician who either signs or Co-signs this chart in the absence of a cardiologist.  None    RADIOLOGY: non-plain film images(s) such as CT, Ultrasound and MRI are read by the radiologist.  None    LABS:   Labs Reviewed - No data to display    EMERGENCY DEPARTMENT COURSE:   Vitals:    Vitals:    10/01/20 0226   BP: 126/89   Pulse: 89   Resp: 16   Temp: 98.8 °F (37.1 °C)   TempSrc: Oral   SpO2: 99%     Patient was assessed at bedside sutures were removed.   9 sutures were removed without any difficulty. Finger does not appear to be infected. Patient will have bacitracin placed on wound with a Band-Aid on top. Patient is instructed to keep the wound clean with soap and water. Patient is instructed to follow-up with his primary care physician. He is instructed to use Tylenol Motrin for any pain. Patient understood and agreed with the plan. Patient subsequently discharged home in good condition. Patient had suture removal.  He is instructed to keep the wound clean washing it at least 2-3 times a day with soap and water. Is instructed to watch for any signs of infection. He is instructed to follow-up with his primary care physician and call for an appointment within the next 1 to 2 days. Is instructed return to the nearest emergency room immediately for any new or worsening complaints. CRITICAL CARE:   None    CONSULTS:  None    PROCEDURES:  None    FINAL IMPRESSION      1.  Visit for suture removal          DISPOSITION/PLAN   Discharge    PATIENT REFERRED TO:  Jackie June, DO  [address] [facility] [phone]    Call in 2 days  For wound re-check      DISCHARGE MEDICATIONS:  New Prescriptions    No medications on file       (Please note that portions of this note were completed with a voice recognition program.  Efforts were made to edit the dictations but occasionally words are mis-transcribed.)    Oumou Piña, [address], DO  10/01/20 2093

## 2020-10-01 NOTE — ED TRIAGE NOTES
Pt presents to the ED via ED lobby with c/o wanting stitches removed from his right pointer finger. Pt states stitches were placed here in the ED 2 weeks ago.

## 2020-11-03 ENCOUNTER — HOSPITAL ENCOUNTER (EMERGENCY)
Age: 60
Discharge: HOME | End: 2020-11-03
Payer: MEDICARE

## 2020-11-03 VITALS
WEIGHT: 200 LBS | BODY MASS INDEX: 26.51 KG/M2 | TEMPERATURE: 98.5 F | HEIGHT: 73 IN | RESPIRATION RATE: 18 BRPM | HEART RATE: 69 BPM | OXYGEN SATURATION: 98 % | SYSTOLIC BLOOD PRESSURE: 148 MMHG | DIASTOLIC BLOOD PRESSURE: 85 MMHG

## 2020-11-03 PROCEDURE — 99283 EMERGENCY DEPT VISIT LOW MDM: CPT

## 2020-11-03 PROCEDURE — 96372 THER/PROPH/DIAG INJ SC/IM: CPT

## 2020-11-03 PROCEDURE — 6360000002 HC RX W HCPCS: Performed by: NURSE PRACTITIONER

## 2020-11-03 PROCEDURE — 6370000000 HC RX 637 (ALT 250 FOR IP): Performed by: NURSE PRACTITIONER

## 2020-11-03 RX ORDER — METHYLPREDNISOLONE SODIUM SUCCINATE 125 MG/2ML
60 INJECTION, POWDER, LYOPHILIZED, FOR SOLUTION INTRAMUSCULAR; INTRAVENOUS ONCE
Status: COMPLETED | OUTPATIENT
Start: 2020-11-03 | End: 2020-11-03

## 2020-11-03 RX ORDER — CYCLOBENZAPRINE HCL 10 MG
10 TABLET ORAL 3 TIMES DAILY PRN
Qty: 21 TABLET | Refills: 0 | Status: SHIPPED | OUTPATIENT
Start: 2020-11-03 | End: 2020-11-13

## 2020-11-03 RX ORDER — CYCLOBENZAPRINE HCL 10 MG
10 TABLET ORAL ONCE
Status: COMPLETED | OUTPATIENT
Start: 2020-11-03 | End: 2020-11-03

## 2020-11-03 RX ORDER — METHYLPREDNISOLONE 4 MG/1
TABLET ORAL
Qty: 1 KIT | Refills: 0 | Status: SHIPPED | OUTPATIENT
Start: 2020-11-03 | End: 2020-11-09

## 2020-11-03 RX ORDER — KETOROLAC TROMETHAMINE 30 MG/ML
30 INJECTION, SOLUTION INTRAMUSCULAR; INTRAVENOUS ONCE
Status: COMPLETED | OUTPATIENT
Start: 2020-11-03 | End: 2020-11-03

## 2020-11-03 RX ADMIN — METHYLPREDNISOLONE SODIUM SUCCINATE 60 MG: 125 INJECTION, POWDER, FOR SOLUTION INTRAMUSCULAR; INTRAVENOUS at 02:46

## 2020-11-03 RX ADMIN — KETOROLAC TROMETHAMINE 30 MG: 30 INJECTION, SOLUTION INTRAMUSCULAR; INTRAVENOUS at 02:46

## 2020-11-03 RX ADMIN — CYCLOBENZAPRINE 10 MG: 10 TABLET, FILM COATED ORAL at 02:45

## 2020-11-03 ASSESSMENT — PAIN SCALES - GENERAL
PAINLEVEL_OUTOF10: 9
PAINLEVEL_OUTOF10: 9

## 2020-11-03 ASSESSMENT — PAIN DESCRIPTION - PAIN TYPE: TYPE: ACUTE PAIN

## 2020-11-03 ASSESSMENT — PAIN DESCRIPTION - LOCATION: LOCATION: LEG

## 2020-11-03 ASSESSMENT — PAIN DESCRIPTION - ORIENTATION: ORIENTATION: RIGHT

## 2020-11-03 NOTE — ED NOTES
Pt medicated per order. Pt respirations easy and unlabored. Pt denies any further needs at this time.       Massimo Chirinos RN  11/03/20 7132

## 2020-11-03 NOTE — ED TRIAGE NOTES
Pt presents to the ED from home c/o right leg pain. Pt states he usually comes here for a Toradol and steroid shot. Pt respirations easy and unlabored. Pt is alert and oriented x4.

## 2020-11-09 ASSESSMENT — ENCOUNTER SYMPTOMS
SHORTNESS OF BREATH: 0
COUGH: 0
ABDOMINAL PAIN: 0
NAUSEA: 0
VOMITING: 0
BACK PAIN: 1

## 2020-11-09 NOTE — ED PROVIDER NOTES
[facility] Emergency Department    CHIEF COMPLAINT       Chief Complaint   Patient presents with    Leg Pain       Nurses Notes reviewed and I agree except as noted in the HPI. HISTORY OF PRESENT ILLNESS    Jeremi Dc loki 61 y.o. male who presents to the ED for evaluation of right sciatica. Patient has a long history of this and it flares up when he works a lot. He is requesting a Toradol and steroid shot. He states this helps his discomfort. Pain is the same as previous. Nothing new. No numbness or tingling. No red flag symptoms        HPI was provided by the patient. REVIEW OF SYSTEMS     Review of Systems   Constitutional: Negative for fever. Respiratory: Negative for cough and shortness of breath. Cardiovascular: Negative for leg swelling. Gastrointestinal: Negative for abdominal pain, nausea and vomiting. Musculoskeletal: Positive for arthralgias, back pain and myalgias. Skin: Negative for rash. PAST MEDICAL HISTORY     Past Medical History:   Diagnosis Date    Bipolar 1 disorder [facility] [address]     COPD (chronic obstructive pulmonary disease) ([facility] [address] [facility]     Hypertension     Lumbar radiculopathy        SURGICALHISTORY      has a past surgical history that includes Foot surgery (Right); Tonsillectomy; Colonoscopy (Left, 6/21/2019); and Upper gastrointestinal endoscopy (Left, 6/21/2019).     CURRENT MEDICATIONS       Discharge Medication List as of 11/3/2020  3:27 AM      CONTINUE these medications which have NOT CHANGED    Details   ketorolac (TORADOL) 10 MG tablet Take 1 tablet by mouth every 6 hours as needed for Pain, Disp-10 tablet,R-0Print      ibuprofen (IBU) 400 MG tablet Take 1 tablet by mouth every 6 hours as needed for Pain, Disp-40 tablet, R-0Print      lactase (LACTAID) 3000 units tablet Take 1 tablet by mouth 3 times daily (with meals), Disp-30 tablet, R-0Print      Calcium Polycarbophil (FIBER) 625 MG TABS Take 1 tablet by mouth daily, Disp-14 tablet, R-0Print acetaminophen (APAP EXTRA STRENGTH) 500 MG tablet Take 1 tablet by mouth every 6 hours as needed for Pain, Disp-120 tablet, R-0Print      gabapentin (NEURONTIN) 300 MG capsule Take 1 capsule by mouth every evening, Disp-15 capsule, R-0Print      albuterol sulfate  (90 BASE) MCG/ACT inhaler 2 puffs every 4-6 hours with spacer for wheezing and chest congestionwhen necessary, Disp-1 Inhaler, R-0      FLUoxetine (PROZAC) 20 MG capsule Take 20 mg by mouth daily. metoprolol (TOPROL-XL) 50 MG XL tablet Take 50 mg by mouth daily. ALLERGIES     has No Known Allergies. FAMILY HISTORY     He indicated that his mother is alive. He indicated that his father is . family history includes Diabetes in his father and mother.     SOCIAL HISTORY       Social History     Socioeconomic History    Marital status: Single     Spouse name: Not on file    Number of children: Not on file    Years of education: Not on file    Highest education level: Not on file   Occupational History    Not on file   Social Needs    Financial resource strain: Not on file    Food insecurity     Worry: Not on file     Inability: Not on file    Transportation needs     Medical: Not on file     Non-medical: Not on file   Tobacco Use    Smoking status: Current Every Day Smoker     Packs/day: 0.25     Years: 30.00     Pack years: 7.50     Types: Cigarettes     Start date: 1983    Smokeless tobacco: Never Used   Substance and Sexual Activity    Alcohol use: Yes     Comment: occasionally    Drug use: No     Comment: hx of    Sexual activity: Yes     Partners: Female   Lifestyle    Physical activity     Days per week: Not on file     Minutes per session: Not on file    Stress: Not on file   Relationships    Social connections     Talks on phone: Not on file     Gets together: Not on file     Attends [employer] service: Not on file     Active member of club or organization: Not on file     Attends meetings of clubs or organizations: Not on file     Relationship status: Not on file    Intimate partner violence     Fear of current or ex partner: Not on file     Emotionally abused: Not on file     Physically abused: Not on file     Forced sexual activity: Not on file   Other Topics Concern    Not on file   Social History Narrative    Not on file       PHYSICAL EXAM     INITIAL VITALS:  height is 6' 1\" (1.854 m) and weight is 200 lb (90.7 kg). His oral temperature is 98.5 °F (36.9 °C). His blood pressure is 148/85 (abnormal) and his pulse is 69. His respiration is 18 and oxygen saturation is 98%. Physical Exam  Constitutional:       Appearance: He is well-developed. HENT:      Head: Normocephalic and atraumatic. Eyes:      Conjunctiva/sclera: Conjunctivae normal.   Cardiovascular:      Rate and Rhythm: Normal rate. Pulmonary:      Effort: Pulmonary effort is normal.   Abdominal:      Palpations: Abdomen is soft. Musculoskeletal: Normal range of motion. General: Tenderness present. No deformity or signs of injury. Legs:    Skin:     General: Skin is warm and dry. Capillary Refill: Capillary refill takes less than 2 seconds. Neurological:      Mental Status: He is alert and oriented to person, place, and time. Psychiatric:         Behavior: Behavior normal.         DIFFERENTIAL DIAGNOSIS:   Exacerbation of chronic back pain, sciatica    DIAGNOSTIC RESULTS     EKG: All EKG's are interpreted by the Emergency Department Physician who eithersigns or Co-signs this chart in the absence of a cardiologist.        RADIOLOGY: non-plainfilm images(s) such as CT, Ultrasound and MRI are read by the radiologist.  Plain radiographic images are visualized and preliminarily interpreted by the emergency physician unless otherwise stated below.   No orders to display         LABS:   Labs Reviewed - No data to display    EMERGENCY DEPARTMENT COURSE:   Vitals:    Vitals:    11/03/20 0218   BP: (!) 148/85 Pulse: 69   Resp: 18   Temp: 98.5 °F (36.9 °C)   TempSrc: Oral   SpO2: 98%   Weight: 200 lb (90.7 kg)   Height: 6' 1\" (1.854 m)          MDM                       Is seen evaluate in the emergency room for exacerbation of chronic back pain with sciatica. Symptoms are unchanged from previous. This is his normal pain. No saddle anesthesia. No loss of bowel or bladder. Patient is treated with Toradol, Solu-Medrol, Flexeril. Symptoms improved and patient is ready to go home. He states he is ready to go to work tomorrow. Patient is discharged with a Medrol Dosepak. Medications   ketorolac (TORADOL) injection 30 mg (30 mg Intramuscular Given 11/3/20 0246)   methylPREDNISolone sodium (SOLU-MEDROL) injection 60 mg (60 mg Intramuscular Given 11/3/20 0246)   cyclobenzaprine (FLEXERIL) tablet 10 mg (10 mg Oral Given 11/3/20 0245)         Patient was seenindependently by myself. The patient's final impression and disposition and plan was determined by myself. CRITICAL CARE:   None    CONSULTS:  None    PROCEDURES:  None    FINAL IMPRESSION     1. Acute exacerbation of chronic low back pain    2.  Sciatica of right side          DISPOSITION/PLAN   DISPOSITION Decision To Discharge 11/03/2020 03:22:30 AM      PATIENT REFERREDTO:  Abdoulaye Wilkinson [address] [phone]    Schedule an appointment as soon as possible for a visit in 2 days  For follow up      DISCHARGE MEDICATIONS:  Discharge Medication List as of 11/3/2020  3:27 AM      START taking these medications    Details   methylPREDNISolone (MEDROL, MARILY,) 4 MG tablet Take by mouth., Disp-1 kit,R-0Print             (Please note that portions of this note were completed with a voice recognition program.  Efforts were made to edit the dictations but occasionally words are mis-transcribed.)         FRANK Watt CNP, APRN - CNP  11/09/20 1602

## 2020-11-12 ENCOUNTER — HOSPITAL ENCOUNTER (EMERGENCY)
Age: 60
Discharge: HOME | End: 2020-11-12
Payer: MEDICARE

## 2020-11-12 VITALS
SYSTOLIC BLOOD PRESSURE: 123 MMHG | HEART RATE: 80 BPM | DIASTOLIC BLOOD PRESSURE: 70 MMHG | WEIGHT: 195 LBS | BODY MASS INDEX: 25.84 KG/M2 | OXYGEN SATURATION: 99 % | HEIGHT: 73 IN | RESPIRATION RATE: 20 BRPM | TEMPERATURE: 98 F

## 2020-11-12 PROCEDURE — 99283 EMERGENCY DEPT VISIT LOW MDM: CPT

## 2020-11-12 PROCEDURE — 6370000000 HC RX 637 (ALT 250 FOR IP): Performed by: NURSE PRACTITIONER

## 2020-11-12 PROCEDURE — 6360000002 HC RX W HCPCS: Performed by: NURSE PRACTITIONER

## 2020-11-12 PROCEDURE — 96372 THER/PROPH/DIAG INJ SC/IM: CPT

## 2020-11-12 RX ORDER — KETOROLAC TROMETHAMINE 30 MG/ML
30 INJECTION, SOLUTION INTRAMUSCULAR; INTRAVENOUS ONCE
Status: COMPLETED | OUTPATIENT
Start: 2020-11-12 | End: 2020-11-12

## 2020-11-12 RX ORDER — CYCLOBENZAPRINE HCL 10 MG
10 TABLET ORAL ONCE
Status: COMPLETED | OUTPATIENT
Start: 2020-11-12 | End: 2020-11-12

## 2020-11-12 RX ORDER — HYDROCODONE BITARTRATE AND ACETAMINOPHEN 5; 325 MG/1; MG/1
1 TABLET ORAL ONCE
Status: COMPLETED | OUTPATIENT
Start: 2020-11-12 | End: 2020-11-12

## 2020-11-12 RX ADMIN — CYCLOBENZAPRINE 10 MG: 10 TABLET, FILM COATED ORAL at 01:42

## 2020-11-12 RX ADMIN — KETOROLAC TROMETHAMINE 30 MG: 30 INJECTION, SOLUTION INTRAMUSCULAR; INTRAVENOUS at 01:42

## 2020-11-12 RX ADMIN — HYDROCODONE BITARTRATE AND ACETAMINOPHEN 1 TABLET: 5; 325 TABLET ORAL at 02:09

## 2020-11-12 ASSESSMENT — PAIN DESCRIPTION - DESCRIPTORS: DESCRIPTORS: SHOOTING

## 2020-11-12 ASSESSMENT — PAIN SCALES - GENERAL
PAINLEVEL_OUTOF10: 8

## 2020-11-12 ASSESSMENT — PAIN DESCRIPTION - PAIN TYPE: TYPE: CHRONIC PAIN

## 2020-11-12 ASSESSMENT — PAIN DESCRIPTION - ORIENTATION: ORIENTATION: RIGHT;LOWER;MID

## 2020-11-12 ASSESSMENT — PAIN DESCRIPTION - LOCATION: LOCATION: BACK

## 2020-11-12 ASSESSMENT — PAIN DESCRIPTION - FREQUENCY: FREQUENCY: CONTINUOUS

## 2020-11-12 NOTE — ED NOTES
Pt presents to the ed with chronic back pain. PT is requesting a shot of toaradol. Pt states that he has seen the chiropractor with no relief. Nothing makes the pain better or worse. No medication taken today for relief.       Manuel Barrett, [place]  94/87/18 0803

## 2020-11-16 ASSESSMENT — ENCOUNTER SYMPTOMS
ABDOMINAL PAIN: 0
BACK PAIN: 1
SHORTNESS OF BREATH: 0
VOMITING: 0
NAUSEA: 0
COUGH: 0

## 2020-11-16 NOTE — ED PROVIDER NOTES
[facility]    CHIEF COMPLAINT       Chief Complaint   Patient presents with    Back Pain       Nurses Notes reviewed and I agree except as noted in the HPI. HISTORY OF PRESENT ILLNESS    Bobby Bose loki 61 y.o. male who presents to the ED for evaluation of chronic back pain. Patient is requesting toradol, solumedrol and flexeril. States pain is unchanged from previous. HPI was provided by the patient. REVIEW OF SYSTEMS     Review of Systems   Constitutional: Negative for fever. Respiratory: Negative for cough and shortness of breath. Gastrointestinal: Negative for abdominal pain, nausea and vomiting. Genitourinary: Negative for dysuria and urgency. Musculoskeletal: Positive for arthralgias, back pain and myalgias. Negative for gait problem. Skin: Negative for rash and wound. PAST MEDICAL HISTORY     Past Medical History:   Diagnosis Date    Bipolar 1 disorder ([facility] [address]     COPD (chronic obstructive pulmonary disease) ([facility] [address]     Hypertension     Lumbar radiculopathy        SURGICALHISTORY      has a past surgical history that includes Foot surgery (Right); Tonsillectomy; Colonoscopy (Left, 6/21/2019); and Upper gastrointestinal endoscopy (Left, 6/21/2019).     CURRENT MEDICATIONS       Discharge Medication List as of 11/12/2020  2:27 AM      CONTINUE these medications which have NOT CHANGED    Details   cyclobenzaprine (FLEXERIL) 10 MG tablet Take 1 tablet by mouth 3 times daily as needed for Muscle spasms, Disp-21 tablet,R-0Print      ketorolac (TORADOL) 10 MG tablet Take 1 tablet by mouth every 6 hours as needed for Pain, Disp-10 tablet,R-0Print      ibuprofen (IBU) 400 MG tablet Take 1 tablet by mouth every 6 hours as needed for Pain, Disp-40 tablet, R-0Print      lactase (LACTAID) 3000 units tablet Take 1 tablet by mouth 3 times daily (with meals), Disp-30 tablet, R-0Print      Calcium Polycarbophil (FIBER) 625 MG TABS Take 1 tablet by mouth daily, Disp-14 tablet, R-0Print      acetaminophen (APAP EXTRA STRENGTH) 500 MG tablet Take 1 tablet by mouth every 6 hours as needed for Pain, Disp-120 tablet, R-0Print      gabapentin (NEURONTIN) 300 MG capsule Take 1 capsule by mouth every evening, Disp-15 capsule, R-0Print      albuterol sulfate  (90 BASE) MCG/ACT inhaler 2 puffs every 4-6 hours with spacer for wheezing and chest congestionwhen necessary, Disp-1 Inhaler, R-0      FLUoxetine (PROZAC) 20 MG capsule Take 20 mg by mouth daily. metoprolol (TOPROL-XL) 50 MG XL tablet Take 50 mg by mouth daily. ALLERGIES     has No Known Allergies. FAMILY HISTORY     He indicated that his mother is alive. He indicated that his father is . family history includes Diabetes in his father and mother.     SOCIAL HISTORY       Social History     Socioeconomic History    Marital status: Single     Spouse name: Not on file    Number of children: Not on file    Years of education: Not on file    Highest education level: Not on file   Occupational History    Not on file   Social Needs    Financial resource strain: Not on file    Food insecurity     Worry: Not on file     Inability: Not on file    Transportation needs     Medical: Not on file     Non-medical: Not on file   Tobacco Use    Smoking status: Current Every Day Smoker     Packs/day: 0.25     Years: 30.00     Pack years: 7.50     Types: Cigarettes     Start date: 1983    Smokeless tobacco: Never Used   Substance and Sexual Activity    Alcohol use: Yes     Comment: occasionally    Drug use: No     Comment: hx of    Sexual activity: Yes     Partners: Female   Lifestyle    Physical activity     Days per week: Not on file     Minutes per session: Not on file    Stress: Not on file   Relationships    Social connections     Talks on phone: Not on file     Gets together: Not on file     Attends L[employer] service: Not on file     Active member of club or organization: Not on file Attends meetings of clubs or organizations: Not on file     Relationship status: Not on file    Intimate partner violence     Fear of current or ex partner: Not on file     Emotionally abused: Not on file     Physically abused: Not on file     Forced sexual activity: Not on file   Other Topics Concern    Not on file   Social History Narrative    Not on file       PHYSICAL EXAM     INITIAL VITALS:  height is 6' 1\" (1.854 m) and weight is 195 lb (88.5 kg). His oral temperature is 98 °F (36.7 °C). His blood pressure is 123/70 and his pulse is 80. His respiration is 20 and oxygen saturation is 99%. Physical Exam  Constitutional:       Appearance: He is well-developed. HENT:      Head: Normocephalic and atraumatic. Eyes:      Conjunctiva/sclera: Conjunctivae normal.   Cardiovascular:      Rate and Rhythm: Normal rate. Pulmonary:      Effort: Pulmonary effort is normal.   Abdominal:      Palpations: Abdomen is soft. Musculoskeletal: Normal range of motion. General: Tenderness (lower back) present. Skin:     General: Skin is warm and dry. Capillary Refill: Capillary refill takes less than 2 seconds. Neurological:      Mental Status: He is alert and oriented to person, place, and time. Psychiatric:         Behavior: Behavior normal.         DIFFERENTIAL DIAGNOSIS:   Exacerbation of chronic back pain. DIAGNOSTIC RESULTS     EKG: All EKG's are interpreted by the Emergency Department Physician who eithersigns or Co-signs this chart in the absence of a cardiologist.        RADIOLOGY: non-plainfilm images(s) such as CT, Ultrasound and MRI are read by the radiologist.  Plain radiographic images are visualized and preliminarily interpreted by the emergency physician unless otherwise stated below.   No orders to display         LABS:   Labs Reviewed - No data to display    EMERGENCY DEPARTMENT COURSE:   Vitals:    Vitals:    11/12/20 0134   BP: 123/70   Pulse: 80   Resp: 20   Temp: 98 °F (36.7 °C) TempSrc: Oral   SpO2: 99%   Weight: 195 lb (88.5 kg)   Height: 6' 1\" (1.854 m)          MDM                       Patient has been seen in the ER for exacerbation of chronic back pain. Treated with toradol, flexeril and norco.  I informed patient that I felt we should avoid steroids at this time because of the number of times he has has steroids (I just sent him home on oral steroids a week ago). Patient is agreeable and is discharged home with follow up. Medications   ketorolac (TORADOL) injection 30 mg (30 mg Intramuscular Given 11/12/20 0142)   cyclobenzaprine (FLEXERIL) tablet 10 mg (10 mg Oral Given 11/12/20 0142)   HYDROcodone-acetaminophen (NORCO) 5-325 MG per tablet 1 tablet (1 tablet Oral Given 11/12/20 0209)         Patient was seenindependently by myself. The patient's final impression and disposition and plan was determined by myself. CRITICAL CARE:   None    CONSULTS:  None    PROCEDURES:  None    FINAL IMPRESSION     1.  Acute exacerbation of chronic low back pain          DISPOSITION/PLAN   DISPOSITION Decision To Discharge 11/12/2020 01:54:17 AM      PATIENT REFERREDTO:  Teri Townsend DO  [address] [place] [address] [phone]    Schedule an appointment as soon as possible for a visit in 2 days  For follow up      DISCHARGE MEDICATIONS:  Discharge Medication List as of 11/12/2020  2:27 AM          (Please note that portions of this note were completed with a voice recognition program.  Efforts were made to edit the dictations but occasionally words are mis-transcribed.)         FRANK Fisher CNP, APRN - CNP  11/16/20 3488

## 2021-01-19 ENCOUNTER — HOSPITAL ENCOUNTER (EMERGENCY)
Age: 61
Discharge: HOME | End: 2021-01-19
Payer: MEDICARE

## 2021-01-19 VITALS
OXYGEN SATURATION: 100 % | DIASTOLIC BLOOD PRESSURE: 92 MMHG | RESPIRATION RATE: 18 BRPM | TEMPERATURE: 97.8 F | HEART RATE: 70 BPM | SYSTOLIC BLOOD PRESSURE: 137 MMHG

## 2021-01-19 DIAGNOSIS — M54.50 ACUTE EXACERBATION OF CHRONIC LOW BACK PAIN: Primary | ICD-10-CM

## 2021-01-19 DIAGNOSIS — G89.29 ACUTE EXACERBATION OF CHRONIC LOW BACK PAIN: Primary | ICD-10-CM

## 2021-01-19 PROCEDURE — 99283 EMERGENCY DEPT VISIT LOW MDM: CPT

## 2021-01-19 PROCEDURE — 6370000000 HC RX 637 (ALT 250 FOR IP): Performed by: NURSE PRACTITIONER

## 2021-01-19 RX ORDER — HYDROCODONE BITARTRATE AND ACETAMINOPHEN 5; 325 MG/1; MG/1
1 TABLET ORAL ONCE
Status: COMPLETED | OUTPATIENT
Start: 2021-01-19 | End: 2021-01-19

## 2021-01-19 RX ORDER — PREDNISONE 20 MG/1
40 TABLET ORAL ONCE
Status: COMPLETED | OUTPATIENT
Start: 2021-01-19 | End: 2021-01-19

## 2021-01-19 RX ORDER — CYCLOBENZAPRINE HCL 10 MG
10 TABLET ORAL 3 TIMES DAILY PRN
Qty: 30 TABLET | Refills: 0 | Status: SHIPPED | OUTPATIENT
Start: 2021-01-19 | End: 2021-02-17 | Stop reason: SDUPTHER

## 2021-01-19 RX ORDER — CYCLOBENZAPRINE HCL 10 MG
10 TABLET ORAL ONCE
Status: COMPLETED | OUTPATIENT
Start: 2021-01-19 | End: 2021-01-19

## 2021-01-19 RX ORDER — CYCLOBENZAPRINE HCL 10 MG
10 TABLET ORAL 3 TIMES DAILY PRN
Qty: 30 TABLET | Refills: 0 | Status: SHIPPED | OUTPATIENT
Start: 2021-01-19 | End: 2021-01-19 | Stop reason: SDUPTHER

## 2021-01-19 RX ADMIN — HYDROCODONE BITARTRATE AND ACETAMINOPHEN 1 TABLET: 5; 325 TABLET ORAL at 06:03

## 2021-01-19 RX ADMIN — CYCLOBENZAPRINE 10 MG: 10 TABLET, FILM COATED ORAL at 06:03

## 2021-01-19 RX ADMIN — PREDNISONE 40 MG: 20 TABLET ORAL at 06:03

## 2021-01-19 ASSESSMENT — ENCOUNTER SYMPTOMS
VOMITING: 0
DIARRHEA: 0
SORE THROAT: 0
SHORTNESS OF BREATH: 0
NAUSEA: 0
BACK PAIN: 1
RHINORRHEA: 0
COUGH: 0

## 2021-01-19 ASSESSMENT — PAIN DESCRIPTION - LOCATION: LOCATION: BACK;LEG

## 2021-01-19 NOTE — ED PROVIDER NOTES
Yoselin Oconnor [address] COMPLAINT       Chief Complaint   Patient presents with    Back Pain    Leg Pain       Nurses Notes reviewed and I agree except as noted in the HPI. HISTORY OF PRESENT ILLNESS    Lakeshia Rosario is a 61 y.o. male who presents to the Emergency Department for the evaluation of acute exacerbation of chronic low back pain. Denies injury, states that he is due for his epidural, is going to his PCP at the [place] today. Pain is worse with bending, states that he has trouble bearing weight on his right leg due to pain. No new injuries      The HPI was provided by the patient. REVIEW OF SYSTEMS     Review of Systems   Constitutional: Negative for chills, diaphoresis, fatigue and fever. HENT: Negative for rhinorrhea and sore throat. Respiratory: Negative for cough and shortness of breath. Cardiovascular: Negative for chest pain and palpitations. Gastrointestinal: Negative for diarrhea, nausea and vomiting. Musculoskeletal: Positive for back pain and gait problem. PAST MEDICAL HISTORY    has a past medical history of Bipolar 1 disorder ([facility] [address] COPD (chronic obstructive pulmonary disease) ([facility] [address] Hypertension, and Lumbar radiculopathy. SURGICAL HISTORY      has a past surgical history that includes Foot surgery (Right); Tonsillectomy; Colonoscopy (Left, 6/21/2019); and Upper gastrointestinal endoscopy (Left, 6/21/2019). CURRENT MEDICATIONS       Previous Medications    ACETAMINOPHEN (APAP EXTRA STRENGTH) 500 MG TABLET    Take 1 tablet by mouth every 6 hours as needed for Pain    ALBUTEROL SULFATE  (90 BASE) MCG/ACT INHALER    2 puffs every 4-6 hours with spacer for wheezing and chest congestionwhen necessary    CALCIUM POLYCARBOPHIL (FIBER) 625 MG TABS    Take 1 tablet by mouth daily    FLUOXETINE (PROZAC) 20 MG CAPSULE    Take 20 mg by mouth daily.     GABAPENTIN (NEURONTIN) 300 MG CAPSULE    Take 1 capsule by gallop. Pulmonary:      Effort: Pulmonary effort is normal. No tachypnea, bradypnea, accessory muscle usage, prolonged expiration, respiratory distress or retractions. Breath sounds: Normal breath sounds. No stridor. No wheezing, rhonchi or rales. Chest:      Chest wall: No tenderness. Abdominal:      General: Abdomen is flat. Bowel sounds are normal. There is no distension. Palpations: Abdomen is soft. There is no shifting dullness, hepatomegaly, splenomegaly or mass. Tenderness: There is no abdominal tenderness. Hernia: No hernia is present. Musculoskeletal: Normal range of motion. General: No swelling, deformity or signs of injury. Lumbar back: He exhibits tenderness and pain. He exhibits no bony tenderness, no swelling, no edema, no deformity and no laceration. Right lower leg: No edema. Left lower leg: No edema. Skin:     General: Skin is warm and dry. Capillary Refill: Capillary refill takes less than 2 seconds. Coloration: Skin is not jaundiced or pale. Neurological:      General: No focal deficit present. Mental Status: He is alert and oriented to person, place, and time. Mental status is at baseline. GCS: GCS eye subscore is 4. GCS verbal subscore is 5. GCS motor subscore is 6. Cranial Nerves: Cranial nerves are intact. Sensory: Sensation is intact. Psychiatric:         Mood and Affect: Mood normal.         Behavior: Behavior normal. Behavior is cooperative.           DIFFERENTIAL DIAGNOSIS:   Sciatica, lumbar strain, spinal stenosis, degenerative disc disease    DIAGNOSTIC RESULTS     EKG: All EKG's are interpreted by the Emergency Department Physician who either signs or Co-signs this chart in the absence of a cardiologist.    None    RADIOLOGY: non-plainfilm images(s) such as CT, Ultrasound and MRI are read by the radiologist.    No orders to display       LABS:     Labs Reviewed - No data to display    EMERGENCY DEPARTMENT COURSE:   Vitals:    Vitals:    01/19/21 0559   BP: (!) 137/92   Pulse: 70   Resp: 18   Temp: 97.8 °F (36.6 °C)   TempSrc: Oral   SpO2: 100%       5:55 AM EST: The patient was seen and evaluated. MDM:  Patient seen and evaluated for his chronic back pain. Medicated appropriately. He has an appointment with the [facility] for management of chronic pain. No other questions or concerns voiced. Observed remained stable throughout ED stay and discharged in stable condition    CRITICAL CARE:   None    CONSULTS:  None    PROCEDURES:  None    FINAL IMPRESSION      1. Acute exacerbation of chronic low back pain          DISPOSITION/PLAN   Discharge    PATIENT REFERRED TO:  Fallon Collazo DO  [address] [place] [id number] [phone]    Go to         DISCHARGE MEDICATIONS:  New Prescriptions    CYCLOBENZAPRINE (FLEXERIL) 10 MG TABLET    Take 1 tablet by mouth 3 times daily as needed for Muscle spasms       (Please note that portions of this note were completed with a voice recognition program.  Efforts were made to edit the dictations but occasionally words are mis-transcribed.)    The patient was given an opportunity to see the Emergency Attending. The patient voiced understanding that I was a Mid-LevelProvider and was in agreement with being seen independently by myself.      FRANK Murray CNP, 1/19/21, 6:08 AM       FRANK Murray CNP  01/19/21 5539

## 2021-01-21 ENCOUNTER — OFFICE VISIT (OUTPATIENT)
Dept: PHYSICAL MEDICINE AND REHAB | Age: 61
End: 2021-01-21
Payer: OTHER GOVERNMENT

## 2021-01-21 ENCOUNTER — TELEPHONE (OUTPATIENT)
Dept: PHYSICAL MEDICINE AND REHAB | Age: 61
End: 2021-01-21

## 2021-01-21 VITALS
DIASTOLIC BLOOD PRESSURE: 84 MMHG | BODY MASS INDEX: 25.84 KG/M2 | WEIGHT: 195 LBS | SYSTOLIC BLOOD PRESSURE: 132 MMHG | HEIGHT: 73 IN

## 2021-01-21 DIAGNOSIS — M48.061 SPINAL STENOSIS OF LUMBAR REGION WITHOUT NEUROGENIC CLAUDICATION: Primary | ICD-10-CM

## 2021-01-21 DIAGNOSIS — M47.816 LUMBAR SPONDYLOSIS: ICD-10-CM

## 2021-01-21 DIAGNOSIS — G89.4 CHRONIC PAIN SYNDROME: ICD-10-CM

## 2021-01-21 DIAGNOSIS — M54.17 LUMBOSACRAL RADICULITIS: ICD-10-CM

## 2021-01-21 PROCEDURE — 99244 OFF/OP CNSLTJ NEW/EST MOD 40: CPT | Performed by: PAIN MEDICINE

## 2021-01-21 RX ORDER — TRAMADOL HYDROCHLORIDE 50 MG/1
50 TABLET ORAL 2 TIMES DAILY PRN
Qty: 28 TABLET | Refills: 0 | Status: SHIPPED | OUTPATIENT
Start: 2021-01-21 | End: 2021-01-22 | Stop reason: SDUPTHER

## 2021-01-21 ASSESSMENT — ENCOUNTER SYMPTOMS
BOWEL INCONTINENCE: 0
ABDOMINAL PAIN: 0
SHORTNESS OF BREATH: 0
COLOR CHANGE: 0
NAUSEA: 0
EYE PAIN: 0
PHOTOPHOBIA: 0
CHEST TIGHTNESS: 0
DIARRHEA: 0
CONSTIPATION: 0
COUGH: 0
WHEEZING: 0
SINUS PRESSURE: 0
SORE THROAT: 0
RHINORRHEA: 0
BACK PAIN: 1
VOMITING: 0

## 2021-01-21 NOTE — LETTER
[facility]  [address]  Phone: [phone]  Fax: 927 [facility], [address] [place] [address],         January 21, 2021       Patient: Felton Cuenca   MR Number: [id number]   YOB: 1960   Date of Visit: 1/21/2021       Dear Dr. Arroyo Page: Thank you for the request for consultation for Allison Gonzalez to me for the evaluation of LUMBAR AND RIGHT THIGH PAIN. Below are the relevant portions of my assessment and plan of care. If you have questions, please do not hesitate to call me. I look forward to following Laila Carrasco along with you.     Sincerely,        Cristina Daley MD     providers:  Ivett Rogel DO  [address] [place] [id number]  Via Fax: [phone]

## 2021-01-21 NOTE — PROGRESS NOTES
[address] [facility] [address]  Dept: [phone]  Dept Fax: [phone]: [phone]    Visit Date: 1/21/2021    Elise Chen is a 61 y.o. male who is referred for pain management evaluation and treatment per Dr. Janna Colon. CAGE and CAGE-AID Questions   1. In the last three months, have you felt you should cut down or stop drinking or using drugs? Yes []        No [x]     2. In the last three months, has anyone annoyed you or gotten on your nerves by telling you to cut down or stop drinking or using drugs? Yes []        No [x]     3. In the last three months, have you felt guilty or bad about how much you drink or use drugs? Yes []        No [x]     4. In the last three months, have you been waking up wanting to have an alcoholic drink or use drugs? Yes []        No [x]        Opioid Risk Tool:  Clinician Form       1. Family History of Substance Abuse: Female Male    Alcohol   []1   []3    Illegal drugs   []2   []3    Prescription drugs     []4   []4   2. Personal History of Substance Abuse:          Alcohol   []3   []3    Illegal drugs   []4   [x]4    Prescription drugs     []5   []5   3. Age (elizabeth box if between 12 and 39):     []1   []1   4. History of Preadolescent Sexual Abuse:     []3   []0   5. Psychological Disease:      Attention deficit disorder, obsessive-compulsive disorder, bipolar, schizophrenia   []2   []2      Depression     []1   []1    Scoring Totals 0 4     Total Score  Low Risk  Moderate Risk  High Risk   Risk Category   0 - 3   4 - 7   8 or Above      Patient states symptoms interfere with:  A.  General Activity:  yes   B. Mood: yes    C. Walking Ability:   yes   D. Normal Work (Includes both work outside the home and housework):   yes    E.  Relations with Other People:  no   F. Sleep:   yes   G.  Enjoyment of Life:  yes       HPI: ChiefComplaint: Lower back and right thigh pain    Back Pain  Chronicity: Patient is a 62 y/o male comes with lower back and right leg pain for several years. Denies any injuries or prior back surgeries. The problem occurs constantly. The problem has been gradually worsening since onset. The pain is present in the lumbar spine. The pain radiates to the right thigh and right knee. Pain scale: pain scale at worse is a 8-9/10 and at best is a 4/10. The symptoms are aggravated by bending, standing and twisting (walking. States pain better with sitting.). Pertinent negatives include no abdominal pain, bladder incontinence, bowel incontinence, chest pain, fever, headaches, numbness or weakness. He has tried muscle relaxant and chiropractic manipulation (Epidural injections -helps) for the symptoms. The treatment provided no relief. MRI LUMBAR SPINE  12/23/2019:                The patient has No Known Allergies. PastMedical History  Cammie Navarro  has a past medical history of Bipolar 1 disorder ([facility] [address] COPD (chronic obstructive pulmonary disease) ([facility] [address] Hypertension, and Lumbar radiculopathy. Past Surgical History  The patient  has a past surgical history that includes Foot surgery (Right); Tonsillectomy; Colonoscopy (Left, 6/21/2019); and Upper gastrointestinal endoscopy (Left, 6/21/2019). Family History  This patient's family history includes Diabetes in his father and mother. Social History  Cammie Navarro  reports that he has been smoking cigarettes. He started smoking about 37 years ago. He has a 7.50 pack-year smoking history. He has never used smokeless tobacco. He reports current alcohol use. He reports that he does not use drugs. Medications    Current Outpatient Medications:     traMADol (ULTRAM) 50 MG tablet, Take 1 tablet by mouth 2 times daily as needed for Pain for up to 14 days.  Take lowest dose possible to manage pain, Disp: 28 tablet, Rfl: 0    cyclobenzaprine (FLEXERIL) 10 MG tablet, Take 1 tablet by mouth 3 times daily as needed for Muscle spasms, Disp: 30 tablet, Rfl: 0    lactase (LACTAID) 3000 units tablet, Take 1 tablet by mouth 3 times daily (with meals), Disp: 30 tablet, Rfl: 0    Calcium Polycarbophil (FIBER) 625 MG TABS, Take 1 tablet by mouth daily, Disp: 14 tablet, Rfl: 0    acetaminophen (APAP EXTRA STRENGTH) 500 MG tablet, Take 1 tablet by mouth every 6 hours as needed for Pain, Disp: 120 tablet, Rfl: 0    gabapentin (NEURONTIN) 300 MG capsule, Take 1 capsule by mouth every evening, Disp: 15 capsule, Rfl: 0    albuterol sulfate  (90 BASE) MCG/ACT inhaler, 2 puffs every 4-6 hours with spacer for wheezing and chest congestionwhen necessary, Disp: 1 Inhaler, Rfl: 0    FLUoxetine (PROZAC) 20 MG capsule, Take 20 mg by mouth daily. , Disp: , Rfl:     metoprolol (TOPROL-XL) 50 MG XL tablet, Take 50 mg by mouth daily. , Disp: , Rfl:     ketorolac (TORADOL) 10 MG tablet, Take 1 tablet by mouth every 6 hours as needed for Pain, Disp: 10 tablet, Rfl: 0    ibuprofen (IBU) 400 MG tablet, Take 1 tablet by mouth every 6 hours as needed for Pain, Disp: 40 tablet, Rfl: 0    Subjective:      Review of Systems   Constitutional: Positive for activity change. Negative for appetite change, chills, diaphoresis, fatigue, fever and unexpected weight change. HENT: Negative for congestion, ear pain, hearing loss, mouth sores, nosebleeds, rhinorrhea, sinus pressure and sore throat. Eyes: Negative for photophobia, pain and visual disturbance. Respiratory: Negative for cough, chest tightness, shortness of breath and wheezing. Cardiovascular: Negative for chest pain and palpitations. Gastrointestinal: Negative for abdominal pain, bowel incontinence, constipation, diarrhea, nausea and vomiting. Endocrine: Negative for cold intolerance, heat intolerance, polydipsia, polyphagia and polyuria.    Genitourinary: Negative for bladder incontinence, decreased urine volume, difficulty urinating, frequency and hematuria. Musculoskeletal: Positive for back pain and gait problem. Negative for arthralgias, joint swelling, myalgias, neck pain and neck stiffness. Skin: Negative for color change and rash. Allergic/Immunologic: Negative for food allergies and immunocompromised state. Neurological: Negative for dizziness, tremors, seizures, syncope, facial asymmetry, speech difficulty, weakness, light-headedness, numbness and headaches. Hematological: Does not bruise/bleed easily. Psychiatric/Behavioral: Negative for agitation, behavioral problems, confusion, decreased concentration, dysphoric mood, hallucinations, self-injury, sleep disturbance and suicidal ideas. The patient is not nervous/anxious and is not hyperactive. Objective:     Vitals:    01/21/21 1317   BP: 132/84   Weight: 195 lb (88.5 kg)   Height: 6' 1\" (1.854 m)       Physical Exam  Vitals signs and nursing note reviewed. Constitutional:       General: He is not in acute distress. Appearance: He is well-developed. He is not diaphoretic. HENT:      Head: Normocephalic and atraumatic. Right Ear: External ear normal.      Left Ear: External ear normal.      Nose: Nose normal.      Mouth/Throat:      Pharynx: No oropharyngeal exudate. Eyes:      General: No scleral icterus. Right eye: No discharge. Left eye: No discharge. Conjunctiva/sclera: Conjunctivae normal.      Pupils: Pupils are equal, round, and reactive to light. Neck:      Musculoskeletal: Full passive range of motion without pain, normal range of motion and neck supple. Normal range of motion. No edema, erythema, neck rigidity or muscular tenderness. Thyroid: No thyromegaly. Cardiovascular:      Rate and Rhythm: Normal rate and regular rhythm. Heart sounds: Normal heart sounds. No murmur. No friction rub. No gallop. Pulmonary:      Effort: Pulmonary effort is normal. No respiratory distress.       Breath sounds: Normal breath sounds. No wheezing or rales. Chest:      Chest wall: No tenderness. Abdominal:      General: Bowel sounds are normal. There is no distension. Palpations: Abdomen is soft. Tenderness: There is no abdominal tenderness. There is no guarding or rebound. Musculoskeletal:      Right shoulder: He exhibits normal range of motion and no tenderness. Left shoulder: He exhibits normal range of motion and no tenderness. Right hip: He exhibits no tenderness. Left hip: He exhibits no tenderness. Right knee: He exhibits normal range of motion. No tenderness found. Left knee: He exhibits normal range of motion. No tenderness found. Right ankle: He exhibits no swelling. Left ankle: He exhibits no swelling. Cervical back: He exhibits normal range of motion and no tenderness. Thoracic back: He exhibits no tenderness. Lumbar back: He exhibits decreased range of motion, tenderness and pain. Right lower leg: He exhibits no swelling. Left lower leg: He exhibits no swelling. Skin:     General: Skin is warm. Coloration: Skin is not pale. Findings: No erythema or rash. Neurological:      Mental Status: He is alert and oriented to person, place, and time. He is not disoriented. Cranial Nerves: No cranial nerve deficit. Sensory: No sensory deficit. Motor: No atrophy or abnormal muscle tone. Coordination: Coordination normal.      Gait: Gait abnormal.      Deep Tendon Reflexes: Babinski sign absent on the right side. Babinski sign absent on the left side. Reflex Scores:       Tricep reflexes are 2+ on the right side and 2+ on the left side. Bicep reflexes are 2+ on the right side and 2+ on the left side. Brachioradialis reflexes are 2+ on the right side and 2+ on the left side. Patellar reflexes are 2+ on the right side and 2+ on the left side.        Achilles reflexes are 1+ on the right side and 2+ on the left safe storage of medications at home   Testing: Reviewed MRI Lumbar Spine with patient.  Procedures: Recommend LESI @ L3-4 RIGHT or other level.  Discussed with patient about risks with procedure including infection, reaction to medication, increased pain, or bleeding.  Medications:Reviewed. States in the past on gabapentin no benefit.  If patient is on prescribed blood thinners will need Pre Op Clearance to hold blood thinners per Cardiologist or PCP for the required time frame per procedure. This is to be completed by the nursing staff during the scheduling process. Previous Treatments tried:  · PT: No,  Painful  · NSAIDs: Yes,  any benefit? No,  how long taken: weeks  · Chiropractic: Yes,  any benefit? No  · Muscle relaxants: Yes,  any benefit? No  · Narcotics: Yes,  any benefit? Yes  · Spine surgeon consult: No  · Any Implants: No    Meds. Prescribed:   Orders Placed This Encounter   Medications    traMADol (ULTRAM) 50 MG tablet     Sig: Take 1 tablet by mouth 2 times daily as needed for Pain for up to 14 days. Take lowest dose possible to manage pain     Dispense:  28 tablet     Refill:  0     Reduce doses taken as pain becomes manageable       Return RIGHT TFLESI @ L4 and L5. Time spent with patient was 60 minutes more than 50% was spent  Counseling/coordinated the patient'scare.     Electronically signed by Sweta Smith MD on 1/21/2021 at 2:11 PM

## 2021-01-22 DIAGNOSIS — M47.816 LUMBAR SPONDYLOSIS: ICD-10-CM

## 2021-01-22 DIAGNOSIS — G89.4 CHRONIC PAIN SYNDROME: ICD-10-CM

## 2021-01-22 DIAGNOSIS — M54.17 LUMBOSACRAL RADICULITIS: ICD-10-CM

## 2021-01-22 DIAGNOSIS — M48.061 SPINAL STENOSIS OF LUMBAR REGION WITHOUT NEUROGENIC CLAUDICATION: ICD-10-CM

## 2021-01-22 RX ORDER — TRAMADOL HYDROCHLORIDE 50 MG/1
50 TABLET ORAL 2 TIMES DAILY PRN
Qty: 28 TABLET | Refills: 0 | Status: SHIPPED | OUTPATIENT
Start: 2021-01-22 | End: 2021-02-02 | Stop reason: SDUPTHER

## 2021-01-25 ENCOUNTER — OFFICE VISIT (OUTPATIENT)
Dept: UROLOGY | Age: 61
End: 2021-01-25
Payer: MEDICARE

## 2021-01-25 ENCOUNTER — TELEPHONE (OUTPATIENT)
Dept: PHYSICAL MEDICINE AND REHAB | Age: 61
End: 2021-01-25

## 2021-01-25 VITALS — BODY MASS INDEX: 25.38 KG/M2 | TEMPERATURE: 96.8 F | WEIGHT: 191.5 LBS | HEIGHT: 73 IN

## 2021-01-25 DIAGNOSIS — N52.01 ERECTILE DYSFUNCTION DUE TO ARTERIAL INSUFFICIENCY: Primary | ICD-10-CM

## 2021-01-25 DIAGNOSIS — N40.1 BENIGN LOCALIZED PROSTATIC HYPERPLASIA WITH LOWER URINARY TRACT SYMPTOMS (LUTS): ICD-10-CM

## 2021-01-25 PROCEDURE — 99214 OFFICE O/P EST MOD 30 MIN: CPT | Performed by: UROLOGY

## 2021-01-25 NOTE — PROGRESS NOTES
Dr. Vito Licea MD  [facility] Urology Clinic Consultation / New Patient Visit    Patient:  Lauri Bernheim  YOB: 1960  Date: 1/25/2021  Consult requested from Samantha Hewitt DO     HISTORY OF PRESENT ILLNESS:   The patient is a 61 y.o. male who presents today for follow-up for the following problem(s): ED  Overall the problem(s) : show no change. Associated Symptoms: No dysuria, gross hematuria. Pain Severity:      Today visit:   1/25/21  ED chornic problem, stable on Trimix. Discussed risks and benefits. Would like to continue the medication. BPH with LUTs - chronic unstable problem with worsening frequency urgency and weak stream.  Discussed options and would like to try Tamsulosin. Discussed risks and benefits. Summary of old records:   (Patient's old records, notes and chart reviewed and summarized above.)  Lauri Bernheim f/u today for Erectile Dysfunction. Previous patient of Dr. Collins Sargent. He remains on Trimix injections which are working well for him, however he is interested in vascular reconstructive surgery for his ED. Unable to achieve erection without Trimix injection. He comes in today by himself. Hx is obtained from the patient and medical record. Last several PSA's:  Lab Results   Component Value Date    PSA 1.08 (H) 11/14/2018    PSA 1.36 04/18/2018    PSA 1.47 10/17/2017       Last total testosterone:  No results found for: TESTOSTERONE    Urinalysis today:  No results found for this visit on 01/25/21.       Last BUN and creatinine:  Lab Results   Component Value Date    BUN 14 11/18/2018     Lab Results   Component Value Date    CREATININE 1.1 11/18/2018       Imaging Reviewed during this Office Visit:   (results were independently reviewed by physician and radiology report verified)    PAST MEDICAL, FAMILY AND SOCIAL HISTORY:  Past Medical History:   Diagnosis Date    Bipolar 1 disorder ([address] [address]     COPD (chronic obstructive pulmonary disease) ([facility] [address]     Hypertension     Lumbar radiculopathy      Past Surgical History:   Procedure Laterality Date    COLONOSCOPY Left 6/21/2019    COLONOSCOPY DIAGNOSTIC performed by Aaliyah Abraham MD at [facility] Right     TONSILLECTOMY      UPPER GASTROINTESTINAL ENDOSCOPY Left 6/21/2019    EGD BIOPSY performed by Aaliyah Abraham MD at [facility]     Family History   Problem Relation Age of Onset    Diabetes Mother     Diabetes Father      No outpatient medications have been marked as taking for the 1/25/21 encounter (Office Visit) with Sheila Albert MD.       Patient has no known allergies. Social History     Tobacco Use   Smoking Status Current Every Day Smoker    Packs/day: 0.25    Years: 30.00    Pack years: 7.50    Types: Cigarettes    Start date: 4/27/1983   Smokeless Tobacco Never Used       Social History     Substance and Sexual Activity   Alcohol Use Yes    Comment: occasionally       REVIEW OF SYSTEMS:  Constitutional: negative  Eyes: negative  Respiratory: negative  Cardiovascular: negative  Gastrointestinal: negative  Musculoskeletal: negative  Genitourinary: negative  Skin: negative   Neurological: negative  Hematological/Lymphatic: negative  Psychological: negative    Physical Exam:    This a 61 y.o. male   Vitals:    01/25/21 1027   Temp: 96.8 °F (36 °C)     Constitutional: Patient in no acute distress   Neuro: alert and oriented to person place and time. Psych: Mood and affect normal.  Head: atraumatic normocephalic  Eyes: EOMi  HEENT: neck supple, trachea midline  Lungs: Respiratory effort normal  Cardiovascular:  Normal peripheral pulses  Abdomen: Soft, non-tender, non-distended, No CVA  Bladder: non-tender and not distended. FROMx4, no cyanosis clubbing edema  Skin: warm and dry      Assessment and Plan      1. Erectile dysfunction due to arterial insufficiency    2.  Benign localized prostatic hyperplasia with lower urinary tract symptoms (LUTS) Plan:      No follow-ups on file. Refill Trimix - Follow up 1 year for maintenance  Flomax - will start, monitor symptoms. Follow up 3 months for med check with [facility].

## 2021-01-29 ENCOUNTER — TELEPHONE (OUTPATIENT)
Dept: PHYSICAL MEDICINE AND REHAB | Age: 61
End: 2021-01-29

## 2021-02-01 ENCOUNTER — ANESTHESIA (OUTPATIENT)
Dept: OPERATING ROOM | Age: 61
End: 2021-02-01
Payer: OTHER GOVERNMENT

## 2021-02-01 ENCOUNTER — TELEPHONE (OUTPATIENT)
Dept: PHYSICAL MEDICINE AND REHAB | Age: 61
End: 2021-02-01

## 2021-02-01 ENCOUNTER — ANESTHESIA EVENT (OUTPATIENT)
Dept: OPERATING ROOM | Age: 61
End: 2021-02-01
Payer: OTHER GOVERNMENT

## 2021-02-01 NOTE — TELEPHONE ENCOUNTER
Pt. In office. Procedure cancelled due to pt. not having anyone to stay during the procedure. Unable to reschedule at that time. States that I could reschedule and leave a him a message regarding new date and time. Procedure rescheduled to 2/8/2021 @ 12:40, arrive at 11:40am. Pt. called, unable to LVM as mailbox is full. Will try again at a later time.

## 2021-02-01 NOTE — ANESTHESIA PRE PROCEDURE
Department of Anesthesiology  Preprocedure Note       Name:  Deniz Guadarrama   Age:  61 y.o.  :  1960                                          MRN:  [id number]         Date:  2021      Surgeon: Opal Huerta):  Ady Giron MD    Procedure: Procedure(s):  RIGHT TFLESI @ L4 and L5. Medications prior to admission:   Prior to Admission medications    Medication Sig Start Date End Date Taking? Authorizing Provider   traMADol (ULTRAM) 50 MG tablet Take 1 tablet by mouth 2 times daily as needed for Pain for up to 14 days. Take lowest dose possible to manage pain 21  Jose Arora DO   ketorolac (TORADOL) 10 MG tablet Take 1 tablet by mouth every 6 hours as needed for Pain 20  FRANK Banegas - CNP   ibuprofen (IBU) 400 MG tablet Take 1 tablet by mouth every 6 hours as needed for Pain 4/15/20 5/21/20  Frances Del Cid DO   lactase (LACTAID) 3000 units tablet Take 1 tablet by mouth 3 times daily (with meals) 3/30/20   Rafi Wilson PA-C   Calcium Polycarbophil (FIBER) 625 MG TABS Take 1 tablet by mouth daily 3/30/20   Rafi Wilson PA-C   acetaminophen (APAP EXTRA STRENGTH) 500 MG tablet Take 1 tablet by mouth every 6 hours as needed for Pain 18   Pratik Espinoza PA-C   gabapentin (NEURONTIN) 300 MG capsule Take 1 capsule by mouth every evening 10/4/17   Juan Carlos Houser DO   albuterol sulfate  (90 BASE) MCG/ACT inhaler 2 puffs every 4-6 hours with spacer for wheezing and chest congestionwhen necessary 16   Benny Sevilla MD   FLUoxetine (PROZAC) 20 MG capsule Take 20 mg by mouth daily. Historical Provider, MD   metoprolol (TOPROL-XL) 50 MG XL tablet Take 50 mg by mouth daily. Historical Provider, MD       Current medications:    No current facility-administered medications for this encounter.         Allergies:  No Known Allergies    Problem List:    Patient Active Problem List   Diagnosis Code    Closed right calcaneal fracture S92.001A Past Medical History:        Diagnosis Date    Bipolar 1 disorder ([facility] [address]     COPD [facility]chronic obstructive pulmonary disease) ([facility]     Hypertension     Lumbar radiculopathy        Past Surgical History:        Procedure Laterality Date    COLONOSCOPY Left 6/21/2019    COLONOSCOPY DIAGNOSTIC performed by Joseph Mott MD at [facility] Right     TONSILLECTOMY      UPPER GASTROINTESTINAL ENDOSCOPY Left 6/21/2019    EGD BIOPSY performed by Joseph Mott MD at [facility]       Social History:    Social History     Tobacco Use    Smoking status: Current Every Day Smoker     Packs/day: 0.25     Years: 30.00     Pack years: 7.50     Types: Cigarettes     Start date: 4/27/1983    Smokeless tobacco: Never Used   Substance Use Topics    Alcohol use: Yes     Comment: occasionally                                Ready to quit: Not Answered  Counseling given: Not Answered      Vital Signs (Current): There were no vitals filed for this visit.                                            BP Readings from Last 3 Encounters:   01/21/21 132/84   01/19/21 (!) 137/92   11/12/20 123/70       NPO Status:                                                                                 BMI:   Wt Readings from Last 3 Encounters:   01/25/21 191 lb 8 oz (86.9 kg)   01/21/21 195 lb (88.5 kg)   11/12/20 195 lb (88.5 kg)     There is no height or weight on file to calculate BMI.    CBC:   Lab Results   Component Value Date    WBC 12.2 11/18/2018    RBC 4.48 11/18/2018    HGB 13.6 11/18/2018    HCT 39.7 11/18/2018    MCV 88.6 11/18/2018    RDW 14.2 11/09/2017     11/18/2018       CMP:   Lab Results   Component Value Date     11/18/2018    K 4.1 11/18/2018     11/18/2018    CO2 22 11/18/2018    BUN 14 11/18/2018    CREATININE 1.1 11/18/2018    LABGLOM 83 11/18/2018    GLUCOSE 123 11/18/2018    PROT 7.5 08/11/2016    CALCIUM 9.1 11/18/2018    BILITOT 0.3 10/25/2016    ALKPHOS 95 08/11/2016 AST 28 10/25/2016    ALT 44 10/25/2016       POC Tests: No results for input(s): POCGLU, POCNA, POCK, POCCL, POCBUN, POCHEMO, POCHCT in the last 72 hours. Coags:   Lab Results   Component Value Date    INR 0.90 07/07/2013    APTT 28.0 07/07/2013       HCG (If Applicable): No results found for: PREGTESTUR, PREGSERUM, HCG, HCGQUANT     ABGs: No results found for: PHART, PO2ART, SCW8MDG, YXO9YWQ, BEART, N6KAUNAU     Type & Screen (If Applicable):  No results found for: LABABO, LABRH    Drug/Infectious Status (If Applicable):  No results found for: HIV, HEPCAB    COVID-19 Screening (If Applicable):   Lab Results   Component Value Date    COVID19 NOT DETECTED 07/16/2020         Anesthesia Evaluation    Airway: Mallampati: II  TM distance: >3 FB   Neck ROM: full  Mouth opening: > = 3 FB Dental:          Pulmonary:   (+) COPD:  decreased breath sounds,                             Cardiovascular:    (+) hypertension:,         Rhythm: regular                      Neuro/Psych:               GI/Hepatic/Renal:             Endo/Other:                     Abdominal:           Vascular:                                        Anesthesia Plan      MAC     ASA 3       Induction: intravenous. Anesthetic plan and risks discussed with patient. Plan discussed with CRNA.                   Pranay Zapata MD   2/1/2021

## 2021-02-02 ENCOUNTER — TELEPHONE (OUTPATIENT)
Dept: PHYSICAL MEDICINE AND REHAB | Age: 61
End: 2021-02-02

## 2021-02-02 DIAGNOSIS — M47.816 LUMBAR SPONDYLOSIS: ICD-10-CM

## 2021-02-02 DIAGNOSIS — M48.061 SPINAL STENOSIS OF LUMBAR REGION WITHOUT NEUROGENIC CLAUDICATION: ICD-10-CM

## 2021-02-02 DIAGNOSIS — M54.17 LUMBOSACRAL RADICULITIS: ICD-10-CM

## 2021-02-02 DIAGNOSIS — G89.4 CHRONIC PAIN SYNDROME: ICD-10-CM

## 2021-02-02 RX ORDER — TRAMADOL HYDROCHLORIDE 50 MG/1
50 TABLET ORAL 2 TIMES DAILY PRN
Qty: 60 TABLET | Refills: 0 | Status: SHIPPED | OUTPATIENT
Start: 2021-02-02 | End: 2021-03-04

## 2021-02-02 NOTE — TELEPHONE ENCOUNTER
Pt. Needs a refill on his Tramadol. Needs a written prescription to take to the [facility].     Thanks

## 2021-02-02 NOTE — TELEPHONE ENCOUNTER
Pt. Contacted office. Informed of new procedure date and time. Reinforced need to have a  bring him, stay here throughout procedure, and remain with him going home. Verbalized understanding.

## 2021-02-02 NOTE — TELEPHONE ENCOUNTER
PLEASE PRINT so patient can take to [facility] reviewed. UDS: + for  Flexeril    Last seen: 1/21/2021.      Follow-up:   Future Appointments   Date Time Provider Francesco Sandra   2/17/2021  7:45 AM Mary Ann Lanier APRN - CNP N [facility] - [facility]   4/26/2021 10:15 AM Carlos Alberto Moore  [facility]

## 2021-02-05 ENCOUNTER — TELEPHONE (OUTPATIENT)
Dept: PHYSICAL MEDICINE AND REHAB | Age: 61
End: 2021-02-05

## 2021-02-08 ENCOUNTER — APPOINTMENT (OUTPATIENT)
Dept: GENERAL RADIOLOGY | Age: 61
End: 2021-02-08
Attending: PAIN MEDICINE
Payer: OTHER GOVERNMENT

## 2021-02-08 ENCOUNTER — HOSPITAL ENCOUNTER (OUTPATIENT)
Age: 61
Setting detail: OUTPATIENT SURGERY
Discharge: HOME | End: 2021-02-08
Attending: PAIN MEDICINE | Admitting: PAIN MEDICINE
Payer: OTHER GOVERNMENT

## 2021-02-08 VITALS
TEMPERATURE: 98.3 F | BODY MASS INDEX: 24.13 KG/M2 | OXYGEN SATURATION: 98 % | RESPIRATION RATE: 12 BRPM | HEART RATE: 72 BPM | WEIGHT: 188 LBS | SYSTOLIC BLOOD PRESSURE: 137 MMHG | DIASTOLIC BLOOD PRESSURE: 80 MMHG | HEIGHT: 74 IN

## 2021-02-08 PROCEDURE — 64484 NJX AA&/STRD TFRM EPI L/S EA: CPT | Performed by: PAIN MEDICINE

## 2021-02-08 PROCEDURE — 7100000011 HC PHASE II RECOVERY - ADDTL 15 MIN: Performed by: PAIN MEDICINE

## 2021-02-08 PROCEDURE — 2709999900 HC NON-CHARGEABLE SUPPLY: Performed by: PAIN MEDICINE

## 2021-02-08 PROCEDURE — 3600000054 HC PAIN LEVEL 3 BASE: Performed by: PAIN MEDICINE

## 2021-02-08 PROCEDURE — 3209999900 FLUORO FOR SURGICAL PROCEDURES

## 2021-02-08 PROCEDURE — 7100000010 HC PHASE II RECOVERY - FIRST 15 MIN: Performed by: PAIN MEDICINE

## 2021-02-08 PROCEDURE — 6360000004 HC RX CONTRAST MEDICATION: Performed by: PAIN MEDICINE

## 2021-02-08 PROCEDURE — 2500000003 HC RX 250 WO HCPCS: Performed by: PAIN MEDICINE

## 2021-02-08 PROCEDURE — 6360000002 HC RX W HCPCS: Performed by: PAIN MEDICINE

## 2021-02-08 PROCEDURE — 2580000003 HC RX 258: Performed by: PAIN MEDICINE

## 2021-02-08 PROCEDURE — 64483 NJX AA&/STRD TFRM EPI L/S 1: CPT | Performed by: PAIN MEDICINE

## 2021-02-08 RX ORDER — BUPIVACAINE HYDROCHLORIDE 2.5 MG/ML
INJECTION, SOLUTION EPIDURAL; INFILTRATION; INTRACAUDAL PRN
Status: DISCONTINUED | OUTPATIENT
Start: 2021-02-08 | End: 2021-02-08 | Stop reason: ALTCHOICE

## 2021-02-08 RX ORDER — SODIUM CHLORIDE 9 MG/ML
INJECTION, SOLUTION INTRAVENOUS CONTINUOUS
Status: DISCONTINUED | OUTPATIENT
Start: 2021-02-08 | End: 2021-02-08 | Stop reason: HOSPADM

## 2021-02-08 RX ORDER — LIDOCAINE HYDROCHLORIDE 10 MG/ML
INJECTION, SOLUTION INFILTRATION; PERINEURAL PRN
Status: DISCONTINUED | OUTPATIENT
Start: 2021-02-08 | End: 2021-02-08 | Stop reason: ALTCHOICE

## 2021-02-08 RX ORDER — DEXAMETHASONE SODIUM PHOSPHATE 4 MG/ML
INJECTION, SOLUTION INTRA-ARTICULAR; INTRALESIONAL; INTRAMUSCULAR; INTRAVENOUS; SOFT TISSUE PRN
Status: DISCONTINUED | OUTPATIENT
Start: 2021-02-08 | End: 2021-02-08 | Stop reason: ALTCHOICE

## 2021-02-08 RX ADMIN — SODIUM CHLORIDE: 9 INJECTION, SOLUTION INTRAVENOUS at 11:52

## 2021-02-08 ASSESSMENT — PAIN DESCRIPTION - PAIN TYPE: TYPE: CHRONIC PAIN

## 2021-02-08 ASSESSMENT — PAIN - FUNCTIONAL ASSESSMENT: PAIN_FUNCTIONAL_ASSESSMENT: 0-10

## 2021-02-08 ASSESSMENT — PAIN SCALES - GENERAL: PAINLEVEL_OUTOF10: 5

## 2021-02-08 NOTE — OP NOTE
Operative Note    Pre-Procedure Note    Patient Name: Telma Hernandez   YOB: 1960  Medical Record Number: [id number]  Date: 2/8/21       Indication:  L-spinal stenosis     Consent: On file. Vital Signs:   Vitals:    02/08/21 1145   BP: (!) 151/85   Pulse: 96   Resp: 16   Temp: 97 °F (36.1 °C)   SpO2: 99%       Past Medical History:   has a past medical history of Bipolar 1 disorder ([facility] [address] COPD (chronic obstructive pulmonary disease) ([facility] [address] Hypertension, and Lumbar radiculopathy. Past Surgical History:   has a past surgical history that includes Foot surgery (Right); Tonsillectomy; Colonoscopy (Left, 6/21/2019); and Upper gastrointestinal endoscopy (Left, 6/21/2019). Pre-Sedation Documentation and Exam:   Vital signs have been reviewed (see flow sheet for vitals). Sedation/ Anesthesia Plan:   MAC    Patient is an appropriate candidate for plan of sedation: yes    Preoperative Diagnosis:  L-radiculitis, L-spinal stenosis    Post-Op Dx: as above    Procedure Performed:  Transforaminal lumbar epidural steroid injection at the levels of L4 and  L5 on the Right side under fluoroscopic guidance . Indication for the Procedure: The patient failed conservative management  for pain in the low back radiating to lower extremities. As the patient is not responding to conservative management and pain is interfering with activities of daily living, we decided to proceed with transforaminal lumbar epidural steroid injection as symptoms are mostly following the nerve root distribution. The procedure and the risks were discussed with the patient and informed consent was obtained. Procedure: TWO LEVEL    A meaningful communication was kept up with the patient throughout   the procedure. The patient is placed in prone position. The skin over the back was prepped and draped in sterile manner.  Then the skin and deep tissues just above the tip of the transverse process of L-4 and L-5 vertebra on Right side were infiltrated with about 10 ml of 1% lidocaine. The #22-gauge, 3.5 inch spinal needle was inserted through the skin wheal  and under fluoroscopy guidance was directed such that the tip of the needle lies in the neuroforamina at about the 6 o'clock position under the pedicle of the L-4 and L-5 vertebra. This was confirmed with AP and lateral views of the fluoroscopy. Then after negative aspiration a total of 0.75 ml of Omnipaque-180 was injected through the needle in divided doses and spread of the contrast in the epidural space as well as along the nerve root was observed. Then after negative aspiration a total of 12 mg of Dexamethasone and 4 ml of 0.25%Marcaine MPF was injected through the needle in divided doses. The needle is removed and a Band-Aid was placed over the needle  insertion site. EBL-0  The patient's vital signs remained stable and the patient tolerated the procedure well. The patient was discharged home in stable condition and will be followed in the pain clinic in the next few weeks for further planning.       Electronically signed by Kylah Marin MD on 2/8/21 at 12:54 PM EST

## 2021-02-08 NOTE — PROGRESS NOTES
1346- Pt doing well IV fluids dripping, 600 ml left in bag to infuse pt asking when he can go updated has until 2:05  1359- [facility]- IV removed, pt has some numbness to right leg sitting on edge of bed to get dressed. 1407- PT dressed ok. 200- Pt dressed sitting in chair asked if got a hold of his ride, states he didn't answer pt advised he is to stay in chair until ride is here. 1420- Tried to call ride again. No answer. 1- Pt wanted me to try to call again his phone . 1428- Pt updated couldn't get a hold of him. 1430- Ride called hes on his way be there in 5 min  1435- Pt to bathroom in wheelchair,  1440- Pt back to room  1448-Pt updated keep checking to see if ride pulled up. [address] called again, states around the corner he was told he needs to come pick [employer] up.  1521- Isa Finney called ride again, he now states he is around the corner and needs a jump. 1539- Pt discharged taken out to vehicle with this RN in wheelchair.

## 2021-02-08 NOTE — PROGRESS NOTES
1305-  Patient arrived to phase II via cart. Spontaneous respirations even and unlabored. Placed on monitor--VSS. Report received from [employer]. 1306-  Assessment completed. Patient is alert and oriented x4. IV infusing 0.9-- no complications. Injection sites clean and dry. Patient states pain 5/10 and tolerable. Patient denies N/T--will monitor. Pedal push and pull strong bilateral.  Patient instructed not to get up without assistance. Call light given. 1310-  Snack and drink given to patient. Family in car. 1315-  Discharge instructions given in pre-op, no further questions. 1325-  Patient doing well. States pain remains tolerable. 1335-  Patient resting. No needs at this time. 1345-  Report given to [employer].

## 2021-02-08 NOTE — H&P
[address] [address]  History and Physical Update    Pt Name: Torres Monaco  MRN: [id number]  YOB: 1960  Date of evaluation: 2/8/2021      I have examined the patient and reviewed the H&P/Consult and there are no changes to the patient or plans.         Electronically signed by Charli Griffin MD on 2/8/2021 at 12:05 PM

## 2021-02-08 NOTE — H&P
H&P    Patient is a 60 y/o male comes with lower back and right leg pain for several years. Denies any injuries or prior back surgeries. The problem occurs constantly. The problem has been gradually worsening since onset. The pain is present in the lumbar spine. The pain radiates to the right thigh and right knee. Pain scale: pain scale at worse is a 8-9/10 and at best is a 4/10. The symptoms are aggravated by bending, standing and twisting (walking. States pain better with sitting.). Pertinent negatives include no abdominal pain, bladder incontinence, bowel incontinence, chest pain, fever, headaches, numbness or weakness. He has tried muscle relaxant and chiropractic manipulation (Epidural injections -helps) for the symptoms. The treatment provided no relief.      MRI LUMBAR SPINE  12/23/2019:                     The patient has No Known Allergies.     PastMedical History  Dixon Lindsay  has a past medical history of Bipolar 1 disorder ([facility] [address] COPD (chronic obstructive pulmonary disease) ([facility] [address] Hypertension, and Lumbar radiculopathy.     Past Surgical History  The patient  has a past surgical history that includes Foot surgery (Right); Tonsillectomy; Colonoscopy (Left, 6/21/2019); and Upper gastrointestinal endoscopy (Left, 6/21/2019).    Family History  This patient's family history includes Diabetes in his father and mother.  Chele Luu  reports that he has been smoking cigarettes. He started smoking about 37 years ago. He has a 7.50 pack-year smoking history. He has never used smokeless tobacco. He reports current alcohol use. He reports that he does not use drugs.     Medications    Current Medication      Current Outpatient Medications:     traMADol (ULTRAM) 50 MG tablet, Take 1 tablet by mouth 2 times daily as needed for Pain for up to 14 days.  Take lowest dose possible to manage pain, Disp: 28 tablet, Rfl: 0    cyclobenzaprine (FLEXERIL) 10 MG tablet, Take 1 tablet by mouth 3 times daily as needed for Muscle spasms, Disp: 30 tablet, Rfl: 0    lactase (LACTAID) 3000 units tablet, Take 1 tablet by mouth 3 times daily (with meals), Disp: 30 tablet, Rfl: 0    Calcium Polycarbophil (FIBER) 625 MG TABS, Take 1 tablet by mouth daily, Disp: 14 tablet, Rfl: 0    acetaminophen (APAP EXTRA STRENGTH) 500 MG tablet, Take 1 tablet by mouth every 6 hours as needed for Pain, Disp: 120 tablet, Rfl: 0    gabapentin (NEURONTIN) 300 MG capsule, Take 1 capsule by mouth every evening, Disp: 15 capsule, Rfl: 0    albuterol sulfate  (90 BASE) MCG/ACT inhaler, 2 puffs every 4-6 hours with spacer for wheezing and chest congestionwhen necessary, Disp: 1 Inhaler, Rfl: 0    FLUoxetine (PROZAC) 20 MG capsule, Take 20 mg by mouth daily. , Disp: , Rfl:     metoprolol (TOPROL-XL) 50 MG XL tablet, Take 50 mg by mouth daily. , Disp: , Rfl:     ketorolac (TORADOL) 10 MG tablet, Take 1 tablet by mouth every 6 hours as needed for Pain, Disp: 10 tablet, Rfl: 0    ibuprofen (IBU) 400 MG tablet, Take 1 tablet by mouth every 6 hours as needed for Pain, Disp: 40 tablet, Rfl: 0        Subjective:      Review of Systems   Constitutional: Positive for activity change. Negative for appetite change, chills, diaphoresis, fatigue, fever and unexpected weight change. HENT: Negative for congestion, ear pain, hearing loss, mouth sores, nosebleeds, rhinorrhea, sinus pressure and sore throat. Eyes: Negative for photophobia, pain and visual disturbance. Respiratory: Negative for cough, chest tightness, shortness of breath and wheezing. Cardiovascular: Negative for chest pain and palpitations. Gastrointestinal: Negative for abdominal pain, bowel incontinence, constipation, diarrhea, nausea and vomiting. Endocrine: Negative for cold intolerance, heat intolerance, polydipsia, polyphagia and polyuria. Genitourinary: Negative for bladder incontinence, decreased urine volume, difficulty urinating, frequency and hematuria.    Musculoskeletal: or rales. Chest:      Chest wall: No tenderness. Abdominal:      General: Bowel sounds are normal. There is no distension. Palpations: Abdomen is soft. Tenderness: There is no abdominal tenderness. There is no guarding or rebound. Musculoskeletal:      Right shoulder: He exhibits normal range of motion and no tenderness. Left shoulder: He exhibits normal range of motion and no tenderness. Right hip: He exhibits no tenderness. Left hip: He exhibits no tenderness. Right knee: He exhibits normal range of motion. No tenderness found. Left knee: He exhibits normal range of motion. No tenderness found. Right ankle: He exhibits no swelling. Left ankle: He exhibits no swelling. Cervical back: He exhibits normal range of motion and no tenderness. Thoracic back: He exhibits no tenderness. Lumbar back: He exhibits decreased range of motion, tenderness and pain. Right lower leg: He exhibits no swelling. Left lower leg: He exhibits no swelling. Skin:     General: Skin is warm. Coloration: Skin is not pale. Findings: No erythema or rash. Neurological:      Mental Status: He is alert and oriented to person, place, and time. He is not disoriented. Cranial Nerves: No cranial nerve deficit. Sensory: No sensory deficit. Motor: No atrophy or abnormal muscle tone. Coordination: Coordination normal.      Gait: Gait abnormal.      Deep Tendon Reflexes: Babinski sign absent on the right side. Babinski sign absent on the left side. Reflex Scores:       Tricep reflexes are 2+ on the right side and 2+ on the left side. Bicep reflexes are 2+ on the right side and 2+ on the left side. Brachioradialis reflexes are 2+ on the right side and 2+ on the left side. Patellar reflexes are 2+ on the right side and 2+ on the left side. Achilles reflexes are 1+ on the right side and 2+ on the left side.      Comments: SLR -POS RLE   Psychiatric:         Attention and Perception: Attention normal. He is attentive. Mood and Affect: Mood normal. Mood is not anxious or depressed. Affect is not labile, blunt, angry or inappropriate. Speech: Speech normal. He is communicative. Speech is not rapid and pressured, delayed, slurred or tangential.         Behavior: Behavior normal. Behavior is not agitated, slowed, aggressive, withdrawn, hyperactive or combative. Behavior is cooperative. Thought Content: Thought content normal. Thought content is not paranoid or delusional. Thought content does not include homicidal or suicidal ideation. Thought content does not include homicidal or suicidal plan. Cognition and Memory: Cognition normal. Memory is not impaired. He does not exhibit impaired recent memory or impaired remote memory. Judgment: Judgment is not impulsive or inappropriate.         IGNACIA test: neg  Yeoman's test: neg  Gaenslen test: neg  Assessment:      1. Spinal stenosis of lumbar region without neurogenic claudication    2. Lumbosacral radiculitis    3. Lumbar spondylosis    4. Chronic pain syndrome       Plan:      · Patient read and signed orientation and opioid agreement. · OARRS reviewed. Current MED: 0  · Patient was not offered naloxone for home. · Discussed long term side effects of medications, tolerance, dependency and addiction. · UDS preformed today. · Patient told can not receive any pain medications from any other source. · No evidence of abuse, diversion or aberrant behavior. · Prescription Needs: Will prescribe patient a short length of pain medications in good mikala until UDS come back  · Medications and/or procedures to improve function and quality of life- patient understanding with this and that may not be pain free  · Discussed possible weaning of medication dosing dependent on treatment/procedure results.    · Discussed with patient about safe storage of medications at home  · Testing: Reviewed MRI Lumbar Spine with patient. · Procedures: Recommend LESI @ L3-4 RIGHT or other level. · Discussed with patient about risks with procedure including infection, reaction to medication, increased pain, or bleeding. · Medications:Reviewed. States in the past on gabapentin no benefit. · If patient is on prescribed blood thinners will need Pre Op Clearance to hold blood thinners per Cardiologist or PCP for the required time frame per procedure. This is to be completed by the nursing staff during the scheduling process.        Previous Treatments tried:  · PT: No,  Painful  · NSAIDs: Yes,  any benefit? No,  how long taken: weeks  · Chiropractic: Yes,  any benefit? No  · Muscle relaxants: Yes,  any benefit? No  · Narcotics: Yes,  any benefit? Yes  · Spine surgeon consult: No  · Any Implants: No             Return RIGHT TFLESI @ L4 and L5.

## 2021-02-16 LAB
ALBUMIN SERPL-MCNC: NORMAL G/DL
ALP BLD-CCNC: NORMAL U/L
ALT SERPL-CCNC: 14 U/L
ANION GAP SERPL CALCULATED.3IONS-SCNC: NORMAL MMOL/L
AST SERPL-CCNC: 21 U/L
AVERAGE GLUCOSE: NORMAL
BILIRUB SERPL-MCNC: 0.4 MG/DL (ref 0.1–1.4)
BUN BLDV-MCNC: NORMAL MG/DL
CALCIUM SERPL-MCNC: NORMAL MG/DL
CHLORIDE BLD-SCNC: NORMAL MMOL/L
CHOLESTEROL, TOTAL: 163 MG/DL
CHOLESTEROL/HDL RATIO: NORMAL
CO2: 29 MMOL/L
CREAT SERPL-MCNC: 1.2 MG/DL
GFR CALCULATED: NORMAL
GLUCOSE BLD-MCNC: 75 MG/DL
HBA1C MFR BLD: 5.8 %
HCT VFR BLD CALC: 38.1 % (ref 41–53)
HDLC SERPL-MCNC: 54 MG/DL (ref 35–70)
HEMOGLOBIN: 12.8 G/DL (ref 13.5–17.5)
LDL CHOLESTEROL CALCULATED: 87 MG/DL (ref 0–160)
NONHDLC SERPL-MCNC: NORMAL MG/DL
PLATELET # BLD: 277 K/ΜL
POTASSIUM SERPL-SCNC: 4.8 MMOL/L
PROSTATE SPECIFIC ANTIGEN: 1.2 NG/ML
SODIUM BLD-SCNC: 136 MMOL/L
TOTAL PROTEIN: NORMAL
TRIGL SERPL-MCNC: 111 MG/DL
VLDLC SERPL CALC-MCNC: NORMAL MG/DL
WBC # BLD: 5.9 10^3/ML

## 2021-02-17 ENCOUNTER — HOSPITAL ENCOUNTER (EMERGENCY)
Age: 61
Discharge: HOME | End: 2021-02-17
Payer: MEDICARE

## 2021-02-17 ENCOUNTER — VIRTUAL VISIT (OUTPATIENT)
Dept: PHYSICAL MEDICINE AND REHAB | Age: 61
End: 2021-02-17
Payer: OTHER GOVERNMENT

## 2021-02-17 ENCOUNTER — HOSPITAL ENCOUNTER (OUTPATIENT)
Age: 61
Discharge: HOME | End: 2021-02-17
Payer: OTHER GOVERNMENT

## 2021-02-17 ENCOUNTER — HOSPITAL ENCOUNTER (OUTPATIENT)
Dept: GENERAL RADIOLOGY | Age: 61
Discharge: HOME | End: 2021-02-17
Payer: OTHER GOVERNMENT

## 2021-02-17 VITALS
DIASTOLIC BLOOD PRESSURE: 104 MMHG | OXYGEN SATURATION: 99 % | WEIGHT: 188 LBS | RESPIRATION RATE: 16 BRPM | SYSTOLIC BLOOD PRESSURE: 139 MMHG | HEART RATE: 97 BPM | TEMPERATURE: 97.4 F | BODY MASS INDEX: 24.14 KG/M2

## 2021-02-17 DIAGNOSIS — M79.604 RIGHT LEG PAIN: ICD-10-CM

## 2021-02-17 DIAGNOSIS — M54.9 EXACERBATION OF CHRONIC BACK PAIN: Primary | ICD-10-CM

## 2021-02-17 DIAGNOSIS — M54.31 SCIATICA OF RIGHT SIDE: ICD-10-CM

## 2021-02-17 DIAGNOSIS — G89.29 EXACERBATION OF CHRONIC BACK PAIN: Primary | ICD-10-CM

## 2021-02-17 DIAGNOSIS — M54.17 LUMBOSACRAL RADICULITIS: Primary | ICD-10-CM

## 2021-02-17 DIAGNOSIS — M48.061 SPINAL STENOSIS OF LUMBAR REGION WITHOUT NEUROGENIC CLAUDICATION: ICD-10-CM

## 2021-02-17 DIAGNOSIS — G89.4 CHRONIC PAIN SYNDROME: ICD-10-CM

## 2021-02-17 DIAGNOSIS — M47.816 LUMBAR SPONDYLOSIS: ICD-10-CM

## 2021-02-17 PROCEDURE — 99214 OFFICE O/P EST MOD 30 MIN: CPT | Performed by: NURSE PRACTITIONER

## 2021-02-17 PROCEDURE — 96372 THER/PROPH/DIAG INJ SC/IM: CPT

## 2021-02-17 PROCEDURE — 6360000002 HC RX W HCPCS: Performed by: NURSE PRACTITIONER

## 2021-02-17 PROCEDURE — 99283 EMERGENCY DEPT VISIT LOW MDM: CPT

## 2021-02-17 PROCEDURE — 73552 X-RAY EXAM OF FEMUR 2/>: CPT

## 2021-02-17 RX ORDER — ORPHENADRINE CITRATE 30 MG/ML
60 INJECTION INTRAMUSCULAR; INTRAVENOUS ONCE
Status: DISCONTINUED | OUTPATIENT
Start: 2021-02-17 | End: 2021-02-17

## 2021-02-17 RX ORDER — METHYLPREDNISOLONE SODIUM SUCCINATE 125 MG/2ML
80 INJECTION, POWDER, LYOPHILIZED, FOR SOLUTION INTRAMUSCULAR; INTRAVENOUS ONCE
Status: COMPLETED | OUTPATIENT
Start: 2021-02-17 | End: 2021-02-17

## 2021-02-17 RX ORDER — CYCLOBENZAPRINE HCL 10 MG
10 TABLET ORAL 3 TIMES DAILY PRN
Qty: 90 TABLET | Refills: 0 | Status: SHIPPED | OUTPATIENT
Start: 2021-02-17 | End: 2021-03-19

## 2021-02-17 RX ORDER — KETOROLAC TROMETHAMINE 30 MG/ML
15 INJECTION, SOLUTION INTRAMUSCULAR; INTRAVENOUS ONCE
Status: COMPLETED | OUTPATIENT
Start: 2021-02-17 | End: 2021-02-17

## 2021-02-17 RX ADMIN — METHYLPREDNISOLONE SODIUM SUCCINATE 80 MG: 125 INJECTION, POWDER, FOR SOLUTION INTRAMUSCULAR; INTRAVENOUS at 03:35

## 2021-02-17 RX ADMIN — KETOROLAC TROMETHAMINE 15 MG: 30 INJECTION, SOLUTION INTRAMUSCULAR at 03:35

## 2021-02-17 ASSESSMENT — ENCOUNTER SYMPTOMS
RHINORRHEA: 0
BACK PAIN: 1
CHEST TIGHTNESS: 0
SHORTNESS OF BREATH: 0
NAUSEA: 0
COUGH: 0
RESPIRATORY NEGATIVE: 1
GASTROINTESTINAL NEGATIVE: 1
BACK PAIN: 0
ABDOMINAL PAIN: 0
VOMITING: 0

## 2021-02-17 ASSESSMENT — PAIN SCALES - GENERAL: PAINLEVEL_OUTOF10: 9

## 2021-02-17 ASSESSMENT — PAIN DESCRIPTION - PAIN TYPE: TYPE: CHRONIC PAIN

## 2021-02-17 NOTE — ED TRIAGE NOTES
Patient to ED from home ambulating from home with complaints of chronic pain. Pt states he has chronic back pain. Pt states he was here last week and was given a pain shot. Pt states pain in his back got better but pain in his right leg has increased. Pt rates pain 9/10 when standing on leg. Pt vital signs stable and respiration unlabored.

## 2021-02-17 NOTE — PROGRESS NOTES
HPI:   Jerilyn Chua is a 61 y.o. male is here today for    Chief Complaint: right leg pain     HPI   Video Visit. TELEHEALTH EVALUATION -- Audio/Visual (During DFYYK-44 public health emergency). This visit was completed virtually using doxy. me. Location of visit: patients home, providers home. FU from Right TFLESI # at L4-5 from 2/8/2021. Received 90% relief of leg pain for about 1 days only and then pain returned and is worse than it was before procedure. Denies any pain in lower back all pain in right lower extremity in right thigh- described as constant sharp pain increased with any walking or activity. Continues home exercises. Having spasms as well. Ultram and flexeril is not helping pain at all. P:iain went to ER last night for increased pain and was given Toradol and steroid which did not help. Medications reviewed. Patient denies side effects with medications. Patient states he is taking medications as prescribed. Hedenies receiving pain medications from other sources. He had 1 ER visit yesterday     Pain scale with out pain medications or at its worst is 8/10. Pain scale with pain medications or at its best is 7/10. Last dose of Ultram was today  Drug screen reviewed from 1/21/2021 and was appropriate  Pill count was not completed today d/t video visit   Patient does not have naloxone available at home. Patient has not required use of naloxone at home since last office visit. The patienthas No Known Allergies. Past Medical History  Jaime Malave  has a past medical history of Bipolar 1 disorder ([facility] [address] COPD (chronic obstructive pulmonary disease) ([facility] [address] Hypertension, and Lumbar radiculopathy. Past Surgical History  The patient  has a past surgical history that includes Foot surgery (Right); Tonsillectomy; Colonoscopy (Left, 6/21/2019); Upper gastrointestinal endoscopy (Left, 6/21/2019); and Pain management procedure (Right, 2/8/2021).     Family History  This patient's family history includes Diabetes in his father and mother. Social History  Yuri Pereira  reports that he has been smoking cigarettes. He started smoking about 37 years ago. He has a 7.50 pack-year smoking history. He has never used smokeless tobacco. He reports current alcohol use. He reports that he does not use drugs. Medications    Current Outpatient Medications:     cyclobenzaprine (FLEXERIL) 10 MG tablet, Take 1 tablet by mouth 3 times daily as needed for Muscle spasms, Disp: 90 tablet, Rfl: 0    traMADol (ULTRAM) 50 MG tablet, Take 1 tablet by mouth 2 times daily as needed for Pain for up to 30 days. Take lowest dose possible to manage pain, Disp: 60 tablet, Rfl: 0    ketorolac (TORADOL) 10 MG tablet, Take 1 tablet by mouth every 6 hours as needed for Pain, Disp: 10 tablet, Rfl: 0    ibuprofen (IBU) 400 MG tablet, Take 1 tablet by mouth every 6 hours as needed for Pain, Disp: 40 tablet, Rfl: 0    lactase (LACTAID) 3000 units tablet, Take 1 tablet by mouth 3 times daily (with meals), Disp: 30 tablet, Rfl: 0    Calcium Polycarbophil (FIBER) 625 MG TABS, Take 1 tablet by mouth daily, Disp: 14 tablet, Rfl: 0    acetaminophen (APAP EXTRA STRENGTH) 500 MG tablet, Take 1 tablet by mouth every 6 hours as needed for Pain, Disp: 120 tablet, Rfl: 0    gabapentin (NEURONTIN) 300 MG capsule, Take 1 capsule by mouth every evening, Disp: 15 capsule, Rfl: 0    albuterol sulfate  (90 BASE) MCG/ACT inhaler, 2 puffs every 4-6 hours with spacer for wheezing and chest congestionwhen necessary, Disp: 1 Inhaler, Rfl: 0    FLUoxetine (PROZAC) 20 MG capsule, Take 20 mg by mouth daily. , Disp: , Rfl:     metoprolol (TOPROL-XL) 50 MG XL tablet, Take 50 mg by mouth daily. , Disp: , Rfl:     Subjective:      Review of Systems   Constitutional: Negative. HENT: Negative. Respiratory: Negative. Cardiovascular: Negative. Gastrointestinal: Negative. Genitourinary: Negative.     Musculoskeletal: Positive for arthralgias, back pain, gait problem and myalgias. Neurological: Positive for weakness and numbness. Psychiatric/Behavioral: The patient is nervous/anxious. Objective: There were no vitals filed for this visit. Physical Exam  Constitutional:       Appearance: Normal appearance. HENT:      Head: Normocephalic and atraumatic. Neurological:      General: No focal deficit present. Mental Status: He is alert. Psychiatric:         Mood and Affect: Mood normal.            Assessment:     1. Lumbosacral radiculitis    2. Spinal stenosis of lumbar region without neurogenic claudication    3. Lumbar spondylosis    4. Right leg pain    5. Chronic pain syndrome            Plan:      · OARRS reviewed. Current MED: 10.00  · Patient was offered naloxone for home. · Discussed long term side effects of medications, tolerance, dependency and addiction. · Previous UDS reviewed  · UDS preformed today for compliance. · Patient told can not receive any pain medications from any other source. · No evidence of abuse, diversion or aberrant behavior.  Medications and/or procedures to improve function and quality of life- patient understanding with this and that may not be pain free   Discussed with patient about safe storage of medications at home   Discussed possible weaning of medication dosing dependent on treatment/procedure results.  Discussed with patient about risks with procedure including infection, reaction to medication, increased pain, or bleeding.  Procedure notes reviewed in detail   Only received 1 day of relief from TFLESI    Ordered updated Lumbar MRI d/t increased pain    Ordered right femur xray   Continue Ultram 50 mg BID prn- recently filled 2/13/2021, discussed possibly changing to [address] [place] [address] in future.  Continue Flexeril prn       Meds.  Prescribed:   Orders Placed This Encounter   Medications    cyclobenzaprine (FLEXERIL) 10 MG tablet     Sig: Take 1 tablet by mouth 3 times daily as needed for Muscle spasms     Dispense:  90 tablet     Refill:  0       Return for FU after lumbar MRI and xray. , follow up  for medications.          Electronically signed by FRANK Donaldson CNP on2/17/2021 at 12:47 PM

## 2021-02-17 NOTE — ED PROVIDER NOTES
[facility] Emergency Department    CHIEF COMPLAINT       Chief Complaint   Patient presents with    Chronic Pain       Nurses Notes reviewed and I agree except as noted in the HPI. HISTORY OF PRESENT ILLNESS    Lyssa Kellogg loki 61 y.o. male who presents to the ED for evaluation of chronic right sided leg pain. The patient is seeing Dr. Lori Nagy and he had an epidural injection done about a week ago. He states that the pain in the right leg isn't improving. He states he has an appointment at 0730 am with Dr. Lori Nagy. Patient states that toradol, steroids, and muscle relaxant usually improves pain. HPI was provided by the patient. REVIEW OF SYSTEMS     Review of Systems   Constitutional: Negative for chills, fatigue and fever. HENT: Negative for congestion, ear discharge, ear pain, postnasal drip and rhinorrhea. Respiratory: Negative for cough, chest tightness and shortness of breath. Cardiovascular: Negative for chest pain. Gastrointestinal: Negative for abdominal pain, nausea and vomiting. Genitourinary: Negative for difficulty urinating, dysuria, enuresis, flank pain and hematuria. Musculoskeletal: Positive for arthralgias and myalgias. Negative for back pain and joint swelling. Neurological: Negative for dizziness, light-headedness, numbness and headaches. Psychiatric/Behavioral: Negative for agitation, behavioral problems and confusion. All other systems negative except as noted. PAST MEDICAL HISTORY     Past Medical History:   Diagnosis Date    Bipolar 1 disorder ([facility] [address]     COPD (chronic obstructive pulmonary disease) ([facility] [address]     Hypertension     Lumbar radiculopathy        SURGICALHISTORY      has a past surgical history that includes Foot surgery (Right); Tonsillectomy; Colonoscopy (Left, 6/21/2019); Upper gastrointestinal endoscopy (Left, 6/21/2019); and Pain management procedure (Right, 2/8/2021).     CURRENT MEDICATIONS       Discharge Medication List as of 2021  3:30 AM      CONTINUE these medications which have NOT CHANGED    Details   traMADol (ULTRAM) 50 MG tablet Take 1 tablet by mouth 2 times daily as needed for Pain for up to 30 days. Take lowest dose possible to manage pain, Disp-60 tablet, R-0Print      ketorolac (TORADOL) 10 MG tablet Take 1 tablet by mouth every 6 hours as needed for Pain, Disp-10 tablet,R-0Print      ibuprofen (IBU) 400 MG tablet Take 1 tablet by mouth every 6 hours as needed for Pain, Disp-40 tablet, R-0Print      lactase (LACTAID) 3000 units tablet Take 1 tablet by mouth 3 times daily (with meals), Disp-30 tablet, R-0Print      Calcium Polycarbophil (FIBER) 625 MG TABS Take 1 tablet by mouth daily, Disp-14 tablet, R-0Print      acetaminophen (APAP EXTRA STRENGTH) 500 MG tablet Take 1 tablet by mouth every 6 hours as needed for Pain, Disp-120 tablet, R-0Print      gabapentin (NEURONTIN) 300 MG capsule Take 1 capsule by mouth every evening, Disp-15 capsule, R-0Print      albuterol sulfate  (90 BASE) MCG/ACT inhaler 2 puffs every 4-6 hours with spacer for wheezing and chest congestionwhen necessary, Disp-1 Inhaler, R-0      FLUoxetine (PROZAC) 20 MG capsule Take 20 mg by mouth daily. metoprolol (TOPROL-XL) 50 MG XL tablet Take 50 mg by mouth daily. ALLERGIES     has No Known Allergies. FAMILY HISTORY     He indicated that his mother is alive. He indicated that his father is . family history includes Diabetes in his father and mother.     SOCIAL HISTORY       Social History     Socioeconomic History    Marital status: Single     Spouse name: Not on file    Number of children: Not on file    Years of education: Not on file    Highest education level: Not on file   Occupational History    Not on file   Social Needs    Financial resource strain: Not on file    Food insecurity     Worry: Not on file     Inability: Not on file    Transportation needs     Medical: Not on file     Non-medical: Not on file   Tobacco Use    Smoking status: Current Every Day Smoker     Packs/day: 0.25     Years: 30.00     Pack years: 7.50     Types: Cigarettes     Start date: 4/27/1983    Smokeless tobacco: Never Used   Substance and Sexual Activity    Alcohol use: Yes     Comment: occasionally    Drug use: No     Comment: hx of    Sexual activity: Yes     Partners: Female   Lifestyle    Physical activity     Days per week: Not on file     Minutes per session: Not on file    Stress: Not on file   Relationships    Social connections     Talks on phone: Not on file     Gets together: Not on file     Attends Evangelical service: Not on file     Active member of club or organization: Not on file     Attends meetings of clubs or organizations: Not on file     Relationship status: Not on file    Intimate partner violence     Fear of current or ex partner: Not on file     Emotionally abused: Not on file     Physically abused: Not on file     Forced sexual activity: Not on file   Other Topics Concern    Not on file   Social History Narrative    Not on file       PHYSICAL EXAM     INITIAL VITALS:  weight is 188 lb (85.3 kg). His oral temperature is 97.4 °F (36.3 °C). His blood pressure is 139/104 (abnormal) and his pulse is 97. His respiration is 16 and oxygen saturation is 99%. Physical Exam  Vitals signs and nursing note reviewed. Constitutional:       General: He is not in acute distress. Appearance: He is well-developed. He is not diaphoretic. HENT:      Head: Normocephalic and atraumatic. Eyes:      General:         Right eye: No discharge. Left eye: No discharge. Conjunctiva/sclera: Conjunctivae normal.   Neck:      Musculoskeletal: Normal range of motion. Trachea: No tracheal deviation. Cardiovascular:      Rate and Rhythm: Normal rate and regular rhythm. Heart sounds: Normal heart sounds. No murmur. No gallop.        Comments: Normal capillary refill  Pulmonary:      Effort: Pulmonary effort is normal. No respiratory distress. Breath sounds: Normal breath sounds. No stridor. Abdominal:      General: Bowel sounds are normal.      Palpations: Abdomen is soft. Musculoskeletal: Normal range of motion. General: Tenderness present. No swelling, deformity or signs of injury. Legs:    Skin:     General: Skin is warm and dry. Coloration: Skin is not pale. Findings: No erythema or rash. Neurological:      Mental Status: He is alert and oriented to person, place, and time. Cranial Nerves: No cranial nerve deficit. Psychiatric:         Behavior: Behavior normal.         DIFFERENTIAL DIAGNOSIS:   Chronic pain    DIAGNOSTIC RESULTS     EKG: All EKG's are interpreted by the Emergency Department Physician who eithersigns or Co-signs this chart in the absence of a cardiologist.        RADIOLOGY: non-plainfilm images(s) such as CT, Ultrasound and MRI are read by the radiologist.  Plain radiographic images are visualized and preliminarily interpreted by the emergency physician unless otherwise stated below. No orders to display         LABS:   Labs Reviewed - No data to display    EMERGENCY DEPARTMENT COURSE:   Vitals:    Vitals:    02/17/21 0250   BP: (!) 139/104   Pulse: 97   Resp: 16   Temp: 97.4 °F (36.3 °C)   TempSrc: Oral   SpO2: 99%   Weight: 188 lb (85.3 kg)           [facility]    Patient was seen in the ER for chronic right hip pain. He has an appointment with Dr. Ken garcia at [phone]. I don't think imaging is necessary as this is not new pain. Patient is treated with toradol, solumedrol and flexeril with improvement. Patient is then discharged home with follow up to his pcp. .      Medications   ketorolac (TORADOL) injection 15 mg (15 mg Intramuscular Given 2/17/21 0335)   methylPREDNISolone sodium (SOLU-MEDROL) injection 80 mg (80 mg Intramuscular Given 2/17/21 0335)         Patient was seen independently by myself.  The patient's final impression and disposition and plan was determined by myself. Strict return precautions and follow up instructions were discussed with the patient prior to discharge, with which the patient agrees. Physical assessment findings, diagnostic testing(s) if applicable, and vital signs reviewed with patient/patient representative. Questions answered. Medications asdirected, including OTC medications for supportive care. Education provided on medications. Differential diagnosis(s) discussed with patient/patient representative. Home care/self care instructions reviewed withpatient/patient representative. Patient is to follow-up with family care provider in 2-3 days if no improvement. Patient is to go to the emergency department if symptoms worsen. Patient/patient representative isaware of care plan, questions answered, verbalizes understanding and is in agreement. CRITICAL CARE:   None    CONSULTS:  none    PROCEDURES:  None    FINAL IMPRESSION     1. Exacerbation of chronic back pain    2.  Sciatica of right side          DISPOSITION/PLAN   DISPOSITION Decision To Discharge 02/17/2021 04:01:01 AM      PATIENT REFERREDTO:  Geetatam Julien DO  [address] [place] [address] [phone]    Schedule an appointment as soon as possible for a visit in 2 days  For follow up    Jaison Cabrera MD  [address] [facility]  [phone]    Go today  as scheduled, For follow up      DISCHARGE MEDICATIONS:  Discharge Medication List as of 2/17/2021  3:30 AM          (Please note that portions of this note were completed with a voice recognition program.  Efforts were made to edit the dictations but occasionally words are mis-transcribed.)         FRANK Hughes CNP, APRN - CNP  02/17/21 8247

## 2021-02-22 ENCOUNTER — APPOINTMENT (OUTPATIENT)
Dept: CT IMAGING | Age: 61
End: 2021-02-22
Payer: OTHER GOVERNMENT

## 2021-02-22 ENCOUNTER — HOSPITAL ENCOUNTER (EMERGENCY)
Age: 61
Discharge: HOME | End: 2021-02-22
Attending: EMERGENCY MEDICINE
Payer: OTHER GOVERNMENT

## 2021-02-22 VITALS
BODY MASS INDEX: 23.1 KG/M2 | RESPIRATION RATE: 18 BRPM | HEART RATE: 87 BPM | OXYGEN SATURATION: 96 % | WEIGHT: 180 LBS | SYSTOLIC BLOOD PRESSURE: 147 MMHG | HEIGHT: 74 IN | DIASTOLIC BLOOD PRESSURE: 94 MMHG | TEMPERATURE: 97.6 F

## 2021-02-22 DIAGNOSIS — G89.29 OTHER CHRONIC PAIN: Primary | ICD-10-CM

## 2021-02-22 LAB
EKG ATRIAL RATE: 80 BPM
EKG P AXIS: 49 DEGREES
EKG P-R INTERVAL: 178 MS
EKG Q-T INTERVAL: 374 MS
EKG QRS DURATION: 84 MS
EKG QTC CALCULATION (BAZETT): 431 MS
EKG R AXIS: 35 DEGREES
EKG T AXIS: 66 DEGREES
EKG VENTRICULAR RATE: 80 BPM

## 2021-02-22 PROCEDURE — 93005 ELECTROCARDIOGRAM TRACING: CPT | Performed by: EMERGENCY MEDICINE

## 2021-02-22 PROCEDURE — 96372 THER/PROPH/DIAG INJ SC/IM: CPT

## 2021-02-22 PROCEDURE — 93010 ELECTROCARDIOGRAM REPORT: CPT | Performed by: NUCLEAR MEDICINE

## 2021-02-22 PROCEDURE — 99284 EMERGENCY DEPT VISIT MOD MDM: CPT

## 2021-02-22 PROCEDURE — 70450 CT HEAD/BRAIN W/O DYE: CPT

## 2021-02-22 PROCEDURE — 6360000002 HC RX W HCPCS

## 2021-02-22 PROCEDURE — 6370000000 HC RX 637 (ALT 250 FOR IP): Performed by: EMERGENCY MEDICINE

## 2021-02-22 RX ORDER — CYCLOBENZAPRINE HCL 10 MG
10 TABLET ORAL ONCE
Status: COMPLETED | OUTPATIENT
Start: 2021-02-22 | End: 2021-02-22

## 2021-02-22 RX ORDER — IBUPROFEN 200 MG
400 TABLET ORAL ONCE
Status: DISCONTINUED | OUTPATIENT
Start: 2021-02-22 | End: 2021-02-22

## 2021-02-22 RX ORDER — METHYLPREDNISOLONE SODIUM SUCCINATE 125 MG/2ML
60 INJECTION, POWDER, LYOPHILIZED, FOR SOLUTION INTRAMUSCULAR; INTRAVENOUS ONCE
Status: COMPLETED | OUTPATIENT
Start: 2021-02-22 | End: 2021-02-22

## 2021-02-22 RX ORDER — METHYLPREDNISOLONE SODIUM SUCCINATE 125 MG/2ML
INJECTION, POWDER, LYOPHILIZED, FOR SOLUTION INTRAMUSCULAR; INTRAVENOUS
Status: COMPLETED
Start: 2021-02-22 | End: 2021-02-22

## 2021-02-22 RX ORDER — KETOROLAC TROMETHAMINE 30 MG/ML
30 INJECTION, SOLUTION INTRAMUSCULAR; INTRAVENOUS ONCE
Status: COMPLETED | OUTPATIENT
Start: 2021-02-22 | End: 2021-02-22

## 2021-02-22 RX ORDER — KETOROLAC TROMETHAMINE 30 MG/ML
INJECTION, SOLUTION INTRAMUSCULAR; INTRAVENOUS
Status: COMPLETED
Start: 2021-02-22 | End: 2021-02-22

## 2021-02-22 RX ADMIN — METHYLPREDNISOLONE SODIUM SUCCINATE 60 MG: 125 INJECTION, POWDER, FOR SOLUTION INTRAMUSCULAR; INTRAVENOUS at 01:20

## 2021-02-22 RX ADMIN — METHYLPREDNISOLONE SODIUM SUCCINATE 60 MG: 125 INJECTION, POWDER, LYOPHILIZED, FOR SOLUTION INTRAMUSCULAR; INTRAVENOUS at 01:20

## 2021-02-22 RX ADMIN — CYCLOBENZAPRINE HYDROCHLORIDE 10 MG: 10 TABLET, FILM COATED ORAL at 01:09

## 2021-02-22 RX ADMIN — KETOROLAC TROMETHAMINE 30 MG: 30 INJECTION, SOLUTION INTRAMUSCULAR; INTRAVENOUS at 01:21

## 2021-02-22 ASSESSMENT — ENCOUNTER SYMPTOMS
EYE REDNESS: 0
SORE THROAT: 0
VOMITING: 0
CONSTIPATION: 0
DIARRHEA: 0
CHOKING: 0
ABDOMINAL PAIN: 0
WHEEZING: 0
EYE DISCHARGE: 0
EYE ITCHING: 0
TROUBLE SWALLOWING: 0
SHORTNESS OF BREATH: 0
BLOOD IN STOOL: 0
NAUSEA: 0
SINUS PRESSURE: 0
RHINORRHEA: 0
COUGH: 0
CHEST TIGHTNESS: 0
VOICE CHANGE: 0
EYE PAIN: 0
PHOTOPHOBIA: 0
BACK PAIN: 1
ABDOMINAL DISTENTION: 0

## 2021-02-22 ASSESSMENT — PAIN DESCRIPTION - LOCATION: LOCATION: ARM;NECK;LEG

## 2021-02-22 ASSESSMENT — PAIN SCALES - GENERAL
PAINLEVEL_OUTOF10: 10
PAINLEVEL_OUTOF10: 8
PAINLEVEL_OUTOF10: 10

## 2021-02-22 NOTE — ED NOTES
Discharge instructions discussed and explained with Pt. Pt. Verbalized understanding and has no further questions or needs at this time.         Elizabeth Luu RN  02/22/21 6433

## 2021-02-22 NOTE — ED NOTES
Pt arrives to the ED for his chronic right arm, right leg and neck pain. Pt states he was seen in the ED for his pain last week however the pain medication wore off and he feels that he is experiencing a side effect of the medication. Pt states his right arm and leg are numb.  Pt rates his pain a 10/10     Elizabeth Luu RN  02/22/21 0101

## 2021-02-22 NOTE — ED PROVIDER NOTES
[facility]  eMERGENCY dEPARTMENT eNCOUnter          [address] [facility]       Chief Complaint   Patient presents with    Leg Pain     right    Arm Pain     right       Nurses Notes reviewed and I agree except as noted in the HPI. HISTORY OF PRESENT ILLNESS    Shannan Beard is a 61 y.o. male who presents with several days of right arm pain, and then right leg pain. Patient does have chronic leg pain and chronic back pain. He sees a pain management specialist.  He just had a injection. He did not tell his pain management specialist about the arm pain. Patient thinks he may be having a medication reaction. He has several concerns that he is worried about. He wished to have an EKG. Patient did ask for narcotics. I politely explained to him that we do not give narcotics for chronic pain. Patient is currently resting on the cot no apparent distress hemodynamically stable neurologically intact. INITIAL NIH STROKE SCALE    INITIAL NIH STROKE SCALE    Time Performed: At presentation    1a. Level of consciousness:  0 - alert; keenly responsive  1b. Level of consciousness questions:  0 - answers both questions correctly  1c. Level of consciousness questions:  0 - performs both tasks correctly  2. Best Gaze:  0 - normal  3. Visual:  0 - no visual loss  4. Facial Palsy:  0 - normal symmetric movement  5a. Motor left arm:  0 - no drift, limb holds 90 (or 45) degrees for full 10 seconds  5b. Motor right arm:  0 - no drift, limb holds 90 (or 45) degrees for full 10 seconds  6a. Motor left le - no drift; leg holds 30 degree position for full 5 seconds  6b. Motor right le - no drift; leg holds 30 degree position for full 5 seconds  7. Limb Ataxia:  0 - absent  8. Sensory:  0 - normal; no sensory loss  9. Best Language:  0 - no aphasia, normal  10. Dysarthria:  0 - normal  11.   Extinction and Inattention:  0 - no abnormality    TOTAL:  0            REVIEW OF SYSTEMS Review of Systems   Constitutional: Negative for activity change, appetite change, diaphoresis, fatigue and unexpected weight change. HENT: Negative for congestion, ear discharge, ear pain, hearing loss, rhinorrhea, sinus pressure, sore throat, trouble swallowing and voice change. Eyes: Negative for photophobia, pain, discharge, redness and itching. Respiratory: Negative for cough, choking, chest tightness, shortness of breath and wheezing. Cardiovascular: Negative for chest pain, palpitations and leg swelling. Gastrointestinal: Negative for abdominal distention, abdominal pain, blood in stool, constipation, diarrhea, nausea and vomiting. Endocrine: Negative for polydipsia, polyphagia and polyuria. Genitourinary: Negative for decreased urine volume, difficulty urinating, dysuria, enuresis, frequency, hematuria and urgency. Musculoskeletal: Positive for back pain and neck pain. Negative for arthralgias, gait problem, myalgias and neck stiffness. Skin: Negative for pallor and rash. Allergic/Immunologic: Negative for immunocompromised state. Neurological: Negative for dizziness, tremors, seizures, syncope, facial asymmetry, weakness, light-headedness, numbness and headaches. Hematological: Negative for adenopathy. Does not bruise/bleed easily. Psychiatric/Behavioral: Negative for agitation, hallucinations and suicidal ideas. The patient is not nervous/anxious. PAST MEDICAL HISTORY    has a past medical history of Bipolar 1 disorder ([facility] [address] COPD (chronic obstructive pulmonary disease) ([facility] [address] Hypertension, and Lumbar radiculopathy. SURGICAL HISTORY      has a past surgical history that includes Foot surgery (Right); Tonsillectomy; Colonoscopy (Left, 6/21/2019); Upper gastrointestinal endoscopy (Left, 6/21/2019); and Pain management procedure (Right, 2/8/2021).     CURRENT MEDICATIONS       Previous Medications    ACETAMINOPHEN (APAP EXTRA STRENGTH) 500 MG TABLET    Take 1 tablet by mouth every 6 hours as needed for Pain    ALBUTEROL SULFATE  (90 BASE) MCG/ACT INHALER    2 puffs every 4-6 hours with spacer for wheezing and chest congestionwhen necessary    CALCIUM POLYCARBOPHIL (FIBER) 625 MG TABS    Take 1 tablet by mouth daily    CYCLOBENZAPRINE (FLEXERIL) 10 MG TABLET    Take 1 tablet by mouth 3 times daily as needed for Muscle spasms    FLUOXETINE (PROZAC) 20 MG CAPSULE    Take 20 mg by mouth daily. GABAPENTIN (NEURONTIN) 300 MG CAPSULE    Take 1 capsule by mouth every evening    IBUPROFEN (IBU) 400 MG TABLET    Take 1 tablet by mouth every 6 hours as needed for Pain    KETOROLAC (TORADOL) 10 MG TABLET    Take 1 tablet by mouth every 6 hours as needed for Pain    LACTASE (LACTAID) 3000 UNITS TABLET    Take 1 tablet by mouth 3 times daily (with meals)    METOPROLOL (TOPROL-XL) 50 MG XL TABLET    Take 50 mg by mouth daily. TRAMADOL (ULTRAM) 50 MG TABLET    Take 1 tablet by mouth 2 times daily as needed for Pain for up to 30 days. Take lowest dose possible to manage pain       ALLERGIES     has No Known Allergies. FAMILY HISTORY     He indicated that his mother is alive. He indicated that his father is . family history includes Diabetes in his father and mother. SOCIAL HISTORY      reports that he has been smoking cigarettes. He started smoking about 37 years ago. He has a 7.50 pack-year smoking history. He has never used smokeless tobacco. He reports current alcohol use. He reports that he does not use drugs. PHYSICAL EXAM     INITIAL VITALS:  height is 6' 2\" (1.88 m) and weight is 180 lb (81.6 kg). His oral temperature is 97.6 °F (36.4 °C). His blood pressure is 151/80 (abnormal) and his pulse is 80. His respiration is 18 and oxygen saturation is 97%. Physical Exam  Vitals signs and nursing note reviewed. Constitutional:       General: He is not in acute distress. Appearance: He is well-developed. He is not diaphoretic.    HENT:      Head: Normocephalic and atraumatic. Right Ear: External ear normal.      Left Ear: External ear normal.      Nose: Nose normal.   Eyes:      General: Lids are normal. No scleral icterus. Right eye: No discharge. Left eye: No discharge. Conjunctiva/sclera: Conjunctivae normal.      Right eye: No exudate. Left eye: No exudate. Pupils: Pupils are equal, round, and reactive to light. Neck:      Musculoskeletal: Normal range of motion and neck supple. Normal range of motion. Thyroid: No thyromegaly. Vascular: No JVD. Trachea: No tracheal deviation. Cardiovascular:      Rate and Rhythm: Normal rate and regular rhythm. Pulses: Normal pulses. Heart sounds: Normal heart sounds, S1 normal and S2 normal. No murmur. No friction rub. No gallop. Pulmonary:      Effort: Pulmonary effort is normal. No respiratory distress. Breath sounds: Normal breath sounds. No stridor. No wheezing or rales. Chest:      Chest wall: No tenderness. Abdominal:      General: Bowel sounds are normal. There is no distension. Palpations: Abdomen is soft. There is no mass. Tenderness: There is no abdominal tenderness. There is no guarding or rebound. Musculoskeletal: Normal range of motion. General: No tenderness. Lymphadenopathy:      Cervical: No cervical adenopathy. Skin:     General: Skin is warm and dry. Capillary Refill: Capillary refill takes less than 2 seconds. Findings: No bruising, ecchymosis, lesion or rash. Neurological:      General: No focal deficit present. Mental Status: He is alert and oriented to person, place, and time. Mental status is at baseline. GCS: GCS eye subscore is 4. GCS verbal subscore is 5. GCS motor subscore is 6. Cranial Nerves: Cranial nerves are intact. No cranial nerve deficit. Sensory: Sensation is intact. No sensory deficit. Motor: Motor function is intact. No weakness.       Coordination: Coordination is intact. Coordination normal.      Gait: Gait is intact. Gait normal.      Deep Tendon Reflexes: Reflexes are normal and symmetric. Reflexes normal.      Reflex Scores:       Tricep reflexes are 2+ on the right side and 2+ on the left side. Bicep reflexes are 2+ on the right side and 2+ on the left side. Brachioradialis reflexes are 2+ on the right side and 2+ on the left side. Patellar reflexes are 2+ on the right side and 2+ on the left side. Achilles reflexes are 2+ on the right side and 2+ on the left side. Psychiatric:         Mood and Affect: Mood normal.         Speech: Speech normal.         Behavior: Behavior normal.         Thought Content: Thought content normal.         Judgment: Judgment normal.           DIFFERENTIAL DIAGNOSIS:   Chronic pain, malingering, drug-seeking behavior. DIAGNOSTIC RESULTS     EKG: All EKG's are interpreted by the Emergency Department Physician who either signs or Co-signs this chart in the absence of a cardiologist.  Normal sinus rhythm, normal axis, ventricular rate of 80 AR interval 178 QRS duration of 84 QT interval 374 QTC of 431. RADIOLOGY: non-plain film images(s) such as CT, Ultrasound and MRI are read by the radiologist.  CT HEAD WO CONTRAST   Final Result   Impression:   No acute hemorrhage or definite acute infarct. This document has been electronically signed by: Harmeet Araujo MD on    02/22/2021 01:59 AM      All CTs at this facility use dose modulation techniques and iterative    reconstructions, and/or weight-based dosing   when appropriate to reduce radiation to a low as reasonably achievable.           LABS:   Labs Reviewed - No data to display    EMERGENCY DEPARTMENT COURSE:   Vitals:    Vitals:    02/22/21 0055   BP: (!) 151/80   Pulse: 80   Resp: 18   Temp: 97.6 °F (36.4 °C)   TempSrc: Oral   SpO2: 97%   Weight: 180 lb (81.6 kg)   Height: 6' 2\" (1.88 m)     Patient was assessed at bedside appropriate labs and imaging were ordered. Patient was given Motrin and Flexeril at bedside. CT here was negative EKG was negative. Patient is awake alert and oriented. No neurological defects. All subjective complaints. At this point I feel it is chronic pain. He is instructed to follow-up with his chronic pain physician and call for an appointment within the next 1 to 2 days, continue to take his current medications as prescribed. This was discussed with the patient at bedside who understood and agreed with the plan. Patient is subsequently discharged home in good condition. Patient has what appears to be chronic pain. Patient is instructed to follow-up with his chronic pain management doctor and call for an appointment within the next 1 to 2 days. Patient is instructed to take all current medications as prescribed. Patient is instructed to return to the emergency room immediately for any new or worsening complaints. CRITICAL CARE:   None    CONSULTS:  None    PROCEDURES:  None    FINAL IMPRESSION      1.  Other chronic pain          DISPOSITION/PLAN   Discharge    PATIENT REFERRED TO:  Mayte Gee MD  [address]  [phone]    Call in 1 day        DISCHARGE MEDICATIONS:  New Prescriptions    No medications on file       (Please note that portions of this note were completed with a voice recognition program.  Efforts were made to edit the dictations but occasionally words are mis-transcribed.)    Rosalia Escobar, [address] [place] [address], DO  02/22/21 2156

## 2021-02-23 ENCOUNTER — HOSPITAL ENCOUNTER (OUTPATIENT)
Dept: MRI IMAGING | Age: 61
Discharge: HOME | End: 2021-02-23
Payer: OTHER GOVERNMENT

## 2021-02-23 DIAGNOSIS — M54.17 LUMBOSACRAL RADICULITIS: ICD-10-CM

## 2021-02-23 DIAGNOSIS — M48.061 SPINAL STENOSIS OF LUMBAR REGION WITHOUT NEUROGENIC CLAUDICATION: ICD-10-CM

## 2021-02-23 DIAGNOSIS — M79.604 RIGHT LEG PAIN: ICD-10-CM

## 2021-02-23 PROCEDURE — 72148 MRI LUMBAR SPINE W/O DYE: CPT

## 2021-02-26 ENCOUNTER — TELEPHONE (OUTPATIENT)
Dept: PHYSICAL MEDICINE AND REHAB | Age: 61
End: 2021-02-26

## 2021-02-26 NOTE — TELEPHONE ENCOUNTER
Here are the results but he will need to be seen in FU to discuss so I can explain. Lumbar MRI:    Multilevel degenerative changes most pronounced at L4-5 where a disc bulge causes moderate to severe spinal canal stenosis in association with facet hypertrophy and ligamentum flavum thickening and moderate bilateral neural foraminal stenosis. There is    also moderate spinal canal stenosis and up to moderate to severe neuroforaminal stenosis at L3-4. I did not order CT of head but here are results  CT of head  Impression:   No acute hemorrhage or definite acute infarct.

## 2021-02-26 NOTE — TELEPHONE ENCOUNTER
Pt request the results of his MRI of the Lumbar spine and  CT of the head. If unavailable , pt states that its okay to leave a voicemail.

## 2021-03-01 ENCOUNTER — TELEPHONE (OUTPATIENT)
Dept: PHYSICAL MEDICINE AND REHAB | Age: 61
End: 2021-03-01

## 2021-03-01 NOTE — TELEPHONE ENCOUNTER
Pt  Would like for staff to call him and explain to him the results of his MRI. He was having difficulty understanding  It. He is also requesting medication for pain.

## 2021-03-02 NOTE — TELEPHONE ENCOUNTER
Pt came in wanting appt scheduled to discuss MRI results. Scheduled with Annetta Alejo CNP 3-3-21 @ 11:20am.  Pt told to be here a few mins early.

## 2021-03-03 ENCOUNTER — TELEPHONE (OUTPATIENT)
Dept: PHYSICAL MEDICINE AND REHAB | Age: 61
End: 2021-03-03

## 2021-03-03 ENCOUNTER — OFFICE VISIT (OUTPATIENT)
Dept: PHYSICAL MEDICINE AND REHAB | Age: 61
End: 2021-03-03
Payer: OTHER GOVERNMENT

## 2021-03-03 VITALS
HEART RATE: 78 BPM | SYSTOLIC BLOOD PRESSURE: 132 MMHG | DIASTOLIC BLOOD PRESSURE: 84 MMHG | TEMPERATURE: 96.7 F | BODY MASS INDEX: 23.1 KG/M2 | HEIGHT: 74 IN | WEIGHT: 180 LBS

## 2021-03-03 DIAGNOSIS — M48.061 SPINAL STENOSIS OF LUMBAR REGION WITHOUT NEUROGENIC CLAUDICATION: Primary | ICD-10-CM

## 2021-03-03 DIAGNOSIS — M79.604 RIGHT LEG PAIN: ICD-10-CM

## 2021-03-03 DIAGNOSIS — M47.816 LUMBAR SPONDYLOSIS: ICD-10-CM

## 2021-03-03 DIAGNOSIS — M16.11 PRIMARY OSTEOARTHRITIS OF RIGHT HIP: ICD-10-CM

## 2021-03-03 DIAGNOSIS — F11.90 CHRONIC, CONTINUOUS USE OF OPIOIDS: ICD-10-CM

## 2021-03-03 DIAGNOSIS — G89.4 CHRONIC PAIN SYNDROME: ICD-10-CM

## 2021-03-03 DIAGNOSIS — M54.17 LUMBOSACRAL RADICULITIS: ICD-10-CM

## 2021-03-03 PROCEDURE — 99215 OFFICE O/P EST HI 40 MIN: CPT | Performed by: NURSE PRACTITIONER

## 2021-03-03 RX ORDER — HYDROCODONE BITARTRATE AND ACETAMINOPHEN 5; 325 MG/1; MG/1
1 TABLET ORAL EVERY 8 HOURS PRN
Qty: 42 TABLET | Refills: 0 | Status: SHIPPED | OUTPATIENT
Start: 2021-03-03 | End: 2021-03-03 | Stop reason: SDUPTHER

## 2021-03-03 RX ORDER — HYDROCODONE BITARTRATE AND ACETAMINOPHEN 5; 325 MG/1; MG/1
1 TABLET ORAL EVERY 8 HOURS PRN
Qty: 42 TABLET | Refills: 0 | Status: SHIPPED | OUTPATIENT
Start: 2021-03-03 | End: 2021-03-16 | Stop reason: SDUPTHER

## 2021-03-03 ASSESSMENT — ENCOUNTER SYMPTOMS
CHEST TIGHTNESS: 0
NAUSEA: 0
VOMITING: 0
EYE PAIN: 0
WHEEZING: 0
RHINORRHEA: 0
ABDOMINAL PAIN: 0
SORE THROAT: 0
SHORTNESS OF BREATH: 0
BACK PAIN: 1
DIARRHEA: 0
COUGH: 0
COLOR CHANGE: 0
PHOTOPHOBIA: 0
CONSTIPATION: 0
SINUS PRESSURE: 0

## 2021-03-03 NOTE — LETTER
[address] [address] [address]  Phone: [phone]  Fax: [phone]    FRANK Antonio CNP        March 3, 2021     Patient: Star Hodges   YOB: 1960   Date of Visit: 3/3/2021       To Whom It May Concern: It is my medical opinion that Reji Hillin not lift over 20 pounds. If you have any questions or concerns, please don't hesitate to call.     Sincerely,        FRANK Antonio CNP

## 2021-03-03 NOTE — TELEPHONE ENCOUNTER
Called Jeaneth at [facility]. We need authorization for a right hip injection. ICD code M16.11. Left a voice message requesting a call back.

## 2021-03-03 NOTE — PROGRESS NOTES
[facility] [address]  Dept: [phone]  Dept Fax: [phone]: [phone]    Visit Date: 3/3/2021    Functionality Assessment/Goals Worksheet     On a scale of 0 (Does not Interfere) to 10 (Completely Interferes)     1. Which number describes how during the past week pain has interfered with       the following:  A. General Activity:  9  B. Mood: 9  C. Walking Ability:  9  D. Normal Work (Includes both work outside the home and housework):  9  E. Relations with Other People:   9  F. Sleep:   10  G. Enjoyment of Life:   10    2. Patient Prefers to Take their Pain Medications:     [x]  On a regular basis   []  Only when necessary    []  Does not take pain medications    3. What are the Patient's Goals/Expectations for Visiting Pain Management? [x]  Learn about my pain    []  Receive Medication   []  Physical Therapy     [x]  Treat Depression   [x]  Receive Injections    []  Treat Sleep   []  Deal with Anxiety and Stress   []  Treat Opoid Dependence/Addiction   [x]  Other:mri results    HPI:   Deniz Guadarrama is a 61 y.o. male is here today for    Chief Complaint: Low back pain, Neck pain, Right leg pain and Hip pain    HPI   F/u MRI   Here today with cane and limping RLE weakness   F/U TFLESI L4 and L5 right  2/8/2021 he states he received about 50% pain relief in his low back but increased pain in his RLE down to knee. He went to ER due to RLE pain and weakness. He continues to take Flexeril Tramadol Neurontin. He states Tramadol doesn't work or help his pain, he has been taking extra without relief. He has been missing work since the [employer] due to pain and RLE weakness. He continues to have right hip pain low back Right SI  Right thigh pain mostly when he goes to stand. He has increased pain with walking standing stairs.     He states he has had right hip pain his entire life nad  Is uneven. Medications reviewed. Patient denies side effects with medications. Patient states he is taking medications as prescribed. Hedenies receiving pain medications from other sources. He complains of any ER visits since last visit. Pain scale with out pain medications or at its worst is 9/10. Pain scale with pain medications or at its best is 6/10. Last dose of Tramadol was yesterday  Drug screen reviewed from 1/2021 and was appropriate  Pill count was not completed today and patient was educated on bringing in medications  Patient does not have naloxone available at home. Patient has not required use of naloxone at home since last office visit. Lumbar MRI  2/2021  FINDINGS:   There is anatomic vertebral body height and alignment. There is small amount of edema in the intervertebral disc at L4-5. There is mixed hyperintense T1 and T2 signal with hyperintense STIR signal at the opposing endplates of D9-5 with associated    osteophytes as evidence for mixture of Modic 1 and Modic 2 degenerative change. Similar, less pronounced, degenerative changes are present at the L2-3 level anteriorly. There are a few small scattered focal areas of hyperintense T1 and T2 signal in the    T12 and L1 vertebral bodies as evidence for hemangiomas. Marrow signal is otherwise within normal limits. The conus terminates in a normal fashion at the L1 level. Paraspinal soft tissues are unremarkable. At T12-L1 there is no significant spinal canal or neuroforaminal stenosis. At L1-2 there is no significant spinal canal stenosis. There is mild bilateral neural foraminal stenosis in association with minimal disc bulge and facet hypertrophy and ligamentum flavum thickening.    At L2-3 there is a disc bulge which indents the thecal sac and appears to contact traversing L3 nerve roots bilaterally and contributes to moderate spinal canal stenosis in association with facet hypertrophy and ligamentum flavum thickening. There is    also moderate bilateral neural foraminal stenosis. At L3-4 there is a disc bulge which contributes to moderate spinal canal stenosis in association with prominent facet hypertrophy and ligamentum flavum thickening. There is moderate to severe right and moderate left neural foraminal stenosis. At L4-5 there is a disc bulge which contributes to moderate to severe spinal canal stenosis in association with prominent facet hypertrophy and ligament flavum thickening. There is also moderate bilateral neural foraminal stenosis. At L5-S1 there is a shallow disc bulge which mildly indents thecal sac but does not contact traversing nerve roots. There is mild to moderate left and moderate right neural foraminal stenosis in association with facet hypertrophy and ligament flavum    thickening.           Impression    Multilevel degenerative changes most pronounced at L4-5 where a disc bulge causes moderate to severe spinal canal stenosis in association with facet hypertrophy and ligamentum flavum thickening and moderate bilateral neural foraminal stenosis. There is    also moderate spinal canal stenosis and up to moderate to severe neuroforaminal stenosis at L3-4.         Right hip XR  2/2020  1. Possible 15 mm osteochondroma arising from the distal lateral femoral metaphysis. 2. Otherwise negative right femur radiographs. .           The patienthas No Known Allergies. Subjective:      Review of Systems   Constitutional: Positive for activity change. Negative for appetite change, chills, diaphoresis, fatigue, fever and unexpected weight change. HENT: Negative for congestion, ear pain, hearing loss, mouth sores, nosebleeds, rhinorrhea, sinus pressure and sore throat. Eyes: Negative for photophobia, pain and visual disturbance. Respiratory: Negative for cough, chest tightness, shortness of breath and wheezing. Cardiovascular: Negative for chest pain and palpitations.    Gastrointestinal: Negative for abdominal pain, constipation, diarrhea, nausea and vomiting. Endocrine: Negative for cold intolerance, heat intolerance, polydipsia, polyphagia and polyuria. Genitourinary: Negative for decreased urine volume, difficulty urinating, frequency and hematuria. Musculoskeletal: Positive for arthralgias, back pain, gait problem and myalgias. Negative for joint swelling, neck pain and neck stiffness. Skin: Negative for color change and rash. Allergic/Immunologic: Negative for food allergies and immunocompromised state. Neurological: Negative for dizziness, tremors, seizures, syncope, facial asymmetry, speech difficulty, weakness, light-headedness, numbness and headaches. Hematological: Does not bruise/bleed easily. Psychiatric/Behavioral: Negative for agitation, behavioral problems, confusion, decreased concentration, dysphoric mood, hallucinations, self-injury, sleep disturbance and suicidal ideas. The patient is not nervous/anxious and is not hyperactive. Objective:     Vitals:    03/03/21 1107   BP: 132/84   Site: Left Upper Arm   Position: Sitting   Cuff Size: Medium Adult   Pulse: 78   Temp: 96.7 °F (35.9 °C)   TempSrc: Temporal   Weight: 180 lb (81.6 kg)   Height: 6' 2\" (1.88 m)       Physical Exam  Vitals signs and nursing note reviewed. Constitutional:       General: He is not in acute distress. Appearance: He is well-developed. He is not diaphoretic. HENT:      Head: Normocephalic and atraumatic. Right Ear: External ear normal.      Left Ear: External ear normal.      Nose: Nose normal.      Mouth/Throat:      Pharynx: No oropharyngeal exudate. Eyes:      General: No scleral icterus. Right eye: No discharge. Left eye: No discharge. Conjunctiva/sclera: Conjunctivae normal.      Pupils: Pupils are equal, round, and reactive to light. Neck:      Musculoskeletal: Full passive range of motion without pain, normal range of motion and neck supple. Normal range of motion. No edema, erythema, neck rigidity or muscular tenderness. Thyroid: No thyromegaly. Cardiovascular:      Rate and Rhythm: Normal rate and regular rhythm. Heart sounds: Normal heart sounds. No murmur. No friction rub. No gallop. Pulmonary:      Effort: Pulmonary effort is normal. No respiratory distress. Breath sounds: Normal breath sounds. No wheezing or rales. Chest:      Chest wall: No tenderness. Abdominal:      General: Bowel sounds are normal. There is no distension. Palpations: Abdomen is soft. Tenderness: There is no abdominal tenderness. There is no guarding or rebound. Musculoskeletal:      Right shoulder: He exhibits tenderness. He exhibits normal range of motion. Left shoulder: He exhibits normal range of motion and no tenderness. Right elbow: Tenderness found. Right wrist: He exhibits tenderness. Right hip: He exhibits decreased range of motion, decreased strength, tenderness and bony tenderness. Left hip: He exhibits no tenderness. Right knee: He exhibits normal range of motion. Tenderness found. Left knee: He exhibits normal range of motion. No tenderness found. Right ankle: He exhibits no swelling. Left ankle: He exhibits no swelling. Cervical back: He exhibits decreased range of motion, tenderness, bony tenderness, pain and spasm. Thoracic back: He exhibits no tenderness. Lumbar back: He exhibits decreased range of motion, tenderness and pain. Right upper arm: He exhibits tenderness. Right forearm: He exhibits tenderness. Right hand: He exhibits tenderness. Decreased sensation noted. Right upper leg: He exhibits tenderness. Right lower leg: He exhibits no swelling. Left lower leg: He exhibits no swelling. Skin:     General: Skin is warm. Coloration: Skin is not pale. Findings: No erythema or rash.    Neurological:      Mental Status: He is alert and oriented to person, place, and time. He is not disoriented. Cranial Nerves: No cranial nerve deficit. Sensory: No sensory deficit. Motor: No atrophy or abnormal muscle tone. Coordination: Coordination normal.      Gait: Gait abnormal.      Deep Tendon Reflexes: Babinski sign absent on the right side. Babinski sign absent on the left side. Reflex Scores:       Tricep reflexes are 2+ on the right side and 2+ on the left side. Bicep reflexes are 2+ on the right side and 2+ on the left side. Brachioradialis reflexes are 2+ on the right side and 2+ on the left side. Patellar reflexes are 2+ on the right side and 2+ on the left side. Achilles reflexes are 1+ on the right side and 2+ on the left side. Comments: SLR -POS RLE   Psychiatric:         Attention and Perception: Attention normal. He is attentive. Mood and Affect: Mood normal. Mood is not anxious or depressed. Affect is not labile, blunt, angry or inappropriate. Speech: Speech normal. He is communicative. Speech is not rapid and pressured, delayed, slurred or tangential.         Behavior: Behavior normal. Behavior is not agitated, slowed, aggressive, withdrawn, hyperactive or combative. Behavior is cooperative. Thought Content: Thought content normal. Thought content is not paranoid or delusional. Thought content does not include homicidal or suicidal ideation. Thought content does not include homicidal or suicidal plan. Cognition and Memory: Cognition normal. Memory is not impaired. He does not exhibit impaired recent memory or impaired remote memory. Judgment: Judgment is not impulsive or inappropriate. IGNACIA test: +  Yeomans test: +  Gaenslen test:+     Assessment:     1. Spinal stenosis of lumbar region without neurogenic claudication    2. Primary osteoarthritis of right hip    3. Lumbar spondylosis    4. Lumbosacral radiculitis    5.  Right leg pain

## 2021-03-08 ENCOUNTER — TELEPHONE (OUTPATIENT)
Dept: PHYSICAL MEDICINE AND REHAB | Age: 61
End: 2021-03-08

## 2021-03-08 NOTE — TELEPHONE ENCOUNTER
Spoke to Angela Puente at the formerly [facility] to get authorization for hip injection. She requested last office note and alerted patient's prime team.  She will call back the end of the week to let us know coverage. March 3rd office visit faxed.

## 2021-03-10 DIAGNOSIS — M47.816 LUMBAR SPONDYLOSIS: Primary | ICD-10-CM

## 2021-03-10 DIAGNOSIS — M47.816 LUMBAR SPONDYLOSIS: ICD-10-CM

## 2021-03-10 DIAGNOSIS — M48.061 SPINAL STENOSIS OF LUMBAR REGION WITHOUT NEUROGENIC CLAUDICATION: ICD-10-CM

## 2021-03-10 DIAGNOSIS — G89.4 CHRONIC PAIN SYNDROME: ICD-10-CM

## 2021-03-10 DIAGNOSIS — M54.17 LUMBOSACRAL RADICULITIS: ICD-10-CM

## 2021-03-10 DIAGNOSIS — F11.90 CHRONIC, CONTINUOUS USE OF OPIOIDS: ICD-10-CM

## 2021-03-10 DIAGNOSIS — M79.604 RIGHT LEG PAIN: ICD-10-CM

## 2021-03-10 RX ORDER — HYDROCODONE BITARTRATE AND ACETAMINOPHEN 5; 325 MG/1; MG/1
1 TABLET ORAL EVERY 8 HOURS PRN
Qty: 42 TABLET | Refills: 0 | Status: CANCELLED | OUTPATIENT
Start: 2021-03-17 | End: 2021-03-31

## 2021-03-10 NOTE — TELEPHONE ENCOUNTER
Pt will be discharged. We do not prescribe narcotics with continuous alcohol use, will no longer prescribe narcotics also due to taking extra without advised to do so and running out an entire week or more early.

## 2021-03-10 NOTE — TELEPHONE ENCOUNTER
Patient came into office today to get a refill on Norco. His medication is not due until 3/17/21. He told me that he has been taking more than what is prescribed and he will be out this weekend. I told him that there was alcohol in his system and he would have to do a UDS today. Patient states he had a little bit of alcohol two days ago.

## 2021-03-12 ENCOUNTER — TELEPHONE (OUTPATIENT)
Dept: PHYSICAL MEDICINE AND REHAB | Age: 61
End: 2021-03-12

## 2021-03-15 ENCOUNTER — HOSPITAL ENCOUNTER (OUTPATIENT)
Age: 61
Setting detail: OUTPATIENT SURGERY
Discharge: HOME | End: 2021-03-15
Attending: PAIN MEDICINE | Admitting: PAIN MEDICINE
Payer: OTHER GOVERNMENT

## 2021-03-15 ENCOUNTER — APPOINTMENT (OUTPATIENT)
Dept: GENERAL RADIOLOGY | Age: 61
End: 2021-03-15
Attending: PAIN MEDICINE
Payer: OTHER GOVERNMENT

## 2021-03-15 VITALS
BODY MASS INDEX: 25.31 KG/M2 | RESPIRATION RATE: 16 BRPM | TEMPERATURE: 97.1 F | SYSTOLIC BLOOD PRESSURE: 137 MMHG | WEIGHT: 197.2 LBS | OXYGEN SATURATION: 97 % | HEART RATE: 74 BPM | HEIGHT: 74 IN | DIASTOLIC BLOOD PRESSURE: 87 MMHG

## 2021-03-15 PROCEDURE — 7100000011 HC PHASE II RECOVERY - ADDTL 15 MIN: Performed by: PAIN MEDICINE

## 2021-03-15 PROCEDURE — 2500000003 HC RX 250 WO HCPCS: Performed by: PAIN MEDICINE

## 2021-03-15 PROCEDURE — 3600000052 HC PAIN LEVEL 2 BASE: Performed by: PAIN MEDICINE

## 2021-03-15 PROCEDURE — 7100000010 HC PHASE II RECOVERY - FIRST 15 MIN: Performed by: PAIN MEDICINE

## 2021-03-15 PROCEDURE — 20610 DRAIN/INJ JOINT/BURSA W/O US: CPT | Performed by: PAIN MEDICINE

## 2021-03-15 PROCEDURE — 2709999900 HC NON-CHARGEABLE SUPPLY: Performed by: PAIN MEDICINE

## 2021-03-15 PROCEDURE — 3209999900 FLUORO FOR SURGICAL PROCEDURES

## 2021-03-15 PROCEDURE — 77002 NEEDLE LOCALIZATION BY XRAY: CPT | Performed by: PAIN MEDICINE

## 2021-03-15 PROCEDURE — 6360000002 HC RX W HCPCS: Performed by: PAIN MEDICINE

## 2021-03-15 RX ORDER — METHYLPREDNISOLONE ACETATE 80 MG/ML
INJECTION, SUSPENSION INTRA-ARTICULAR; INTRALESIONAL; INTRAMUSCULAR; SOFT TISSUE PRN
Status: DISCONTINUED | OUTPATIENT
Start: 2021-03-15 | End: 2021-03-15 | Stop reason: ALTCHOICE

## 2021-03-15 RX ORDER — BUPIVACAINE HYDROCHLORIDE 2.5 MG/ML
INJECTION, SOLUTION EPIDURAL; INFILTRATION; INTRACAUDAL PRN
Status: DISCONTINUED | OUTPATIENT
Start: 2021-03-15 | End: 2021-03-15 | Stop reason: ALTCHOICE

## 2021-03-15 RX ORDER — LIDOCAINE HYDROCHLORIDE 10 MG/ML
INJECTION, SOLUTION INFILTRATION; PERINEURAL PRN
Status: DISCONTINUED | OUTPATIENT
Start: 2021-03-15 | End: 2021-03-15 | Stop reason: ALTCHOICE

## 2021-03-15 ASSESSMENT — PAIN - FUNCTIONAL ASSESSMENT: PAIN_FUNCTIONAL_ASSESSMENT: 0-10

## 2021-03-15 NOTE — OP NOTE
Operative Note  Pre-Procedure Note    Patient Name: Stuart Mortensen   YOB: 1960  Medical Record Number: [id number]  Date: 3/15/21       Indication:  RIGHT HIP PAIN  Consent: On file. Vital Signs:   Vitals:    03/15/21 1219   BP: (!) 150/97   Pulse: 84   Resp: 18   Temp: 96.3 °F (35.7 °C)   SpO2: 98%       Past Medical History:   has a past medical history of Bipolar 1 disorder ([facility] [address] COPD (chronic obstructive pulmonary disease) ([facility] [address] Hypertension, and Lumbar radiculopathy. Past Surgical History:   has a past surgical history that includes Foot surgery (Right); Tonsillectomy; Colonoscopy (Left, 6/21/2019); Upper gastrointestinal endoscopy (Left, 6/21/2019); and Pain management procedure (Right, 2/8/2021). Pre-Sedation Documentation and Exam:   Vital signs have been reviewed (see flow sheet for vitals). Sedation/ Anesthesia Plan:   LOCAL    Patient is an appropriate candidate for plan of sedation: yes    Preoperative Diagnosis:   1.  right hip joint osteoarthritis. Postoperative Diagnosis:   1. right hip joint osteoarthritis. Procedure Performed:  1.  right hip joint injection. under fluroscopic guidance. Indication for the Procedure: The patient is complaining of pain in the right hip area of longstanding duration. Patient's pain is not responding to conservative measures and is interfering with activities of daily living. Physical examination revealed tenderness over the joint and movements of the joint are painful. Je's test is positive with patient complaining of pain in the hip. As patient is not responding to conservative management and interfering with activities of daily living we decided to proceed with right hip injection. The procedure and risks were discussed with the patient and an informed consent was obtained. Procedure: The patient is placed in lateral position with right hip upward.   The skin over the hip joint was preped and draped in sterile manner. The skin and deep tissues over the greater trochanter were infilitrated with 3 ml of  1% lidocaine. The # 22-gauge, 3-1/2 inch long needle was insrted through the skin under fluroscopy such that the tip of the needle lies in the joint space. Then after negative aspiration a total of 1 ml of 0.25% Marcaine with 80 mg of DEPOMEDROL was injected. EBL-0    Patient's vital signs remained stable and tolerated the procedure well. Patient was discharged home in stable condition and will be followed in the pain clinic in the next few weeks for further planning.     Electronically signed by Cristina Daley MD on 3/15/21 at 1:29 PM EDT

## 2021-03-15 NOTE — H&P
H&P    F/u MRI   Here today with cane and limping RLE weakness   F/U TFLESI L4 and L5 right  2/8/2021 he states he received about 50% pain relief in his low back but increased pain in his RLE down to knee. He went to ER due to RLE pain and weakness. He continues to take Flexeril Tramadol Neurontin. He states Tramadol doesn't work or help his pain, he has been taking extra without relief. He has been missing work since the [employer] due to pain and RLE weakness. He continues to have right hip pain low back Right SI  Right thigh pain mostly when he goes to stand. He has increased pain with walking standing stairs. He states he has had right hip pain his entire life nad  Is uneven. Medications reviewed. Patient denies side effects with medications. Patient states he is taking medications as prescribed. Hedenies receiving pain medications from other sources. He complains of any ER visits since last visit.     Pain scale with out pain medications or at its worst is 9/10. Pain scale with pain medications or at its best is 6/10. Last dose of Tramadol was yesterday  Drug screen reviewed from 1/2021 and was appropriate  Pill count was not completed today and patient was educated on bringing in medications  Patient does not have naloxone available at home. Patient has not required use of naloxone at home since last office visit. Lumbar MRI  2/2021  FINDINGS:   There is anatomic vertebral body height and alignment. There is small amount of edema in the intervertebral disc at L4-5. There is mixed hyperintense T1 and T2 signal with hyperintense STIR signal at the opposing endplates of U2-2 with associated    osteophytes as evidence for mixture of Modic 1 and Modic 2 degenerative change. Similar, less pronounced, degenerative changes are present at the L2-3 level anteriorly. There are a few small scattered focal areas of hyperintense T1 and T2 signal in the    T12 and L1 vertebral bodies as evidence for hemangiomas. Marrow signal is otherwise within normal limits. The conus terminates in a normal fashion at the L1 level. Paraspinal soft tissues are unremarkable. At T12-L1 there is no significant spinal canal or neuroforaminal stenosis. At L1-2 there is no significant spinal canal stenosis. There is mild bilateral neural foraminal stenosis in association with minimal disc bulge and facet hypertrophy and ligamentum flavum thickening. At L2-3 there is a disc bulge which indents the thecal sac and appears to contact traversing L3 nerve roots bilaterally and contributes to moderate spinal canal stenosis in association with facet hypertrophy and ligamentum flavum thickening. There is    also moderate bilateral neural foraminal stenosis. At L3-4 there is a disc bulge which contributes to moderate spinal canal stenosis in association with prominent facet hypertrophy and ligamentum flavum thickening. There is moderate to severe right and moderate left neural foraminal stenosis. At L4-5 there is a disc bulge which contributes to moderate to severe spinal canal stenosis in association with prominent facet hypertrophy and ligament flavum thickening. There is also moderate bilateral neural foraminal stenosis. At L5-S1 there is a shallow disc bulge which mildly indents thecal sac but does not contact traversing nerve roots. There is mild to moderate left and moderate right neural foraminal stenosis in association with facet hypertrophy and ligament flavum    thickening.           Impression    Multilevel degenerative changes most pronounced at L4-5 where a disc bulge causes moderate to severe spinal canal stenosis in association with facet hypertrophy and ligamentum flavum thickening and moderate bilateral neural foraminal stenosis. There is    also moderate spinal canal stenosis and up to moderate to severe neuroforaminal stenosis at L3-4.          Right hip XR  2/2020  1.  Possible 15 mm osteochondroma arising from the distal Exam  Vitals signs and nursing note reviewed. Constitutional:       General: He is not in acute distress. Appearance: He is well-developed. He is not diaphoretic. HENT:      Head: Normocephalic and atraumatic. Right Ear: External ear normal.      Left Ear: External ear normal.      Nose: Nose normal.      Mouth/Throat:      Pharynx: No oropharyngeal exudate. Eyes:      General: No scleral icterus. Right eye: No discharge. Left eye: No discharge. Conjunctiva/sclera: Conjunctivae normal.      Pupils: Pupils are equal, round, and reactive to light. Neck:      Musculoskeletal: Full passive range of motion without pain, normal range of motion and neck supple. Normal range of motion. No edema, erythema, neck rigidity or muscular tenderness. Thyroid: No thyromegaly. Cardiovascular:      Rate and Rhythm: Normal rate and regular rhythm. Heart sounds: Normal heart sounds. No murmur. No friction rub. No gallop. Pulmonary:      Effort: Pulmonary effort is normal. No respiratory distress. Breath sounds: Normal breath sounds. No wheezing or rales. Chest:      Chest wall: No tenderness. Abdominal:      General: Bowel sounds are normal. There is no distension. Palpations: Abdomen is soft. Tenderness: There is no abdominal tenderness. There is no guarding or rebound. Musculoskeletal:      Right shoulder: He exhibits tenderness. He exhibits normal range of motion. Left shoulder: He exhibits normal range of motion and no tenderness. Right elbow: Tenderness found. Right wrist: He exhibits tenderness. Right hip: He exhibits decreased range of motion, decreased strength, tenderness and bony tenderness. Left hip: He exhibits no tenderness. Right knee: He exhibits normal range of motion. Tenderness found. Left knee: He exhibits normal range of motion. No tenderness found. Right ankle: He exhibits no swelling.       Left ankle: He exhibits no swelling. Cervical back: He exhibits decreased range of motion, tenderness, bony tenderness, pain and spasm. Thoracic back: He exhibits no tenderness. Lumbar back: He exhibits decreased range of motion, tenderness and pain. Right upper arm: He exhibits tenderness. Right forearm: He exhibits tenderness. Right hand: He exhibits tenderness. Decreased sensation noted. Right upper leg: He exhibits tenderness. Right lower leg: He exhibits no swelling. Left lower leg: He exhibits no swelling. Skin:     General: Skin is warm. Coloration: Skin is not pale. Findings: No erythema or rash. Neurological:      Mental Status: He is alert and oriented to person, place, and time. He is not disoriented. Cranial Nerves: No cranial nerve deficit. Sensory: No sensory deficit. Motor: No atrophy or abnormal muscle tone. Coordination: Coordination normal.      Gait: Gait abnormal.      Deep Tendon Reflexes: Babinski sign absent on the right side. Babinski sign absent on the left side. Reflex Scores:       Tricep reflexes are 2+ on the right side and 2+ on the left side. Bicep reflexes are 2+ on the right side and 2+ on the left side. Brachioradialis reflexes are 2+ on the right side and 2+ on the left side. Patellar reflexes are 2+ on the right side and 2+ on the left side. Achilles reflexes are 1+ on the right side and 2+ on the left side. Comments: SLR -POS RLE   Psychiatric:         Attention and Perception: Attention normal. He is attentive. Mood and Affect: Mood normal. Mood is not anxious or depressed. Affect is not labile, blunt, angry or inappropriate. Speech: Speech normal. He is communicative. Speech is not rapid and pressured, delayed, slurred or tangential.         Behavior: Behavior normal. Behavior is not agitated, slowed, aggressive, withdrawn, hyperactive or combative.  Behavior is cooperative. Thought Content: Thought content normal. Thought content is not paranoid or delusional. Thought content does not include homicidal or suicidal ideation. Thought content does not include homicidal or suicidal plan. Cognition and Memory: Cognition normal. Memory is not impaired. He does not exhibit impaired recent memory or impaired remote memory. Judgment: Judgment is not impulsive or inappropriate.         IGNACIA test: +  Yeomans test: +  Gaenslen test:+  Assessment:      1. Spinal stenosis of lumbar region without neurogenic claudication    2. Primary osteoarthritis of right hip    3. Lumbar spondylosis    4. Lumbosacral radiculitis    5. Right leg pain    6. Chronic pain syndrome    7. Chronic, continuous use of opioids       Plan:      · OARRS reviewed. Current MED: 10  · Patient was not offered naloxone for home. · Discussed long term side effects of medications, tolerance, dependency and addiction. · Previous UDS reviewed  · UDS preformed today for compliance. · Patient told can not receive any pain medications from any other source. · No evidence of abuse, diversion or aberrant behavior. · Medications and/or procedures to improve function and quality of life- patient understanding with this and that may not be pain free  · Discussed with patient about safe storage of medications at home  · Discussed possible weaning of medication dosing dependent on treatment/procedure results. · Testing: reviewed Lumbar MRI. · Procedures: right hip steroid injection no sedation then 3-4 weeks later LESI L2 or L3 with sedation   · Discussed with patient about risks with procedure including infection, reaction to medication, increased pain, or bleeding. · Medications:stop Tramadol Trial Norco 5 prn q 8 hrs, educated to bring pills to every visit.    · If patient is on blood thinners will need approval to hold:N/A   · Discussed  Hip femur  surgical eval or spine surgical eval if need  · No lifting over 20 lbs              Return for right hip steroid injection no sedation  3-4 weeks later LESI @L2 or  L3 F/U after.

## 2021-03-16 ENCOUNTER — OFFICE VISIT (OUTPATIENT)
Dept: PHYSICAL MEDICINE AND REHAB | Age: 61
End: 2021-03-16
Payer: OTHER GOVERNMENT

## 2021-03-16 ENCOUNTER — TELEPHONE (OUTPATIENT)
Dept: PHYSICAL MEDICINE AND REHAB | Age: 61
End: 2021-03-16

## 2021-03-16 VITALS
BODY MASS INDEX: 25.28 KG/M2 | HEIGHT: 74 IN | DIASTOLIC BLOOD PRESSURE: 84 MMHG | WEIGHT: 197 LBS | HEART RATE: 80 BPM | SYSTOLIC BLOOD PRESSURE: 144 MMHG | TEMPERATURE: 97.8 F

## 2021-03-16 DIAGNOSIS — M54.17 LUMBOSACRAL RADICULITIS: ICD-10-CM

## 2021-03-16 DIAGNOSIS — M48.061 SPINAL STENOSIS OF LUMBAR REGION WITHOUT NEUROGENIC CLAUDICATION: Primary | ICD-10-CM

## 2021-03-16 DIAGNOSIS — M16.11 PRIMARY OSTEOARTHRITIS OF RIGHT HIP: ICD-10-CM

## 2021-03-16 DIAGNOSIS — F11.90 CHRONIC, CONTINUOUS USE OF OPIOIDS: ICD-10-CM

## 2021-03-16 DIAGNOSIS — M79.604 RIGHT LEG PAIN: ICD-10-CM

## 2021-03-16 DIAGNOSIS — G89.4 CHRONIC PAIN SYNDROME: ICD-10-CM

## 2021-03-16 DIAGNOSIS — M47.816 LUMBAR SPONDYLOSIS: ICD-10-CM

## 2021-03-16 DIAGNOSIS — G62.9 NEUROPATHY: ICD-10-CM

## 2021-03-16 PROCEDURE — 99214 OFFICE O/P EST MOD 30 MIN: CPT | Performed by: NURSE PRACTITIONER

## 2021-03-16 RX ORDER — HYDROCODONE BITARTRATE AND ACETAMINOPHEN 5; 325 MG/1; MG/1
1 TABLET ORAL EVERY 6 HOURS PRN
Qty: 120 TABLET | Refills: 0 | Status: SHIPPED | OUTPATIENT
Start: 2021-03-16 | End: 2021-03-16 | Stop reason: SDUPTHER

## 2021-03-16 RX ORDER — TIZANIDINE 4 MG/1
4 TABLET ORAL EVERY 8 HOURS PRN
Qty: 90 TABLET | Refills: 1 | Status: SHIPPED | OUTPATIENT
Start: 2021-03-16 | End: 2021-03-16 | Stop reason: SDUPTHER

## 2021-03-16 RX ORDER — GABAPENTIN 300 MG/1
300 CAPSULE ORAL 2 TIMES DAILY
Qty: 60 CAPSULE | Refills: 1 | Status: SHIPPED | OUTPATIENT
Start: 2021-03-16 | End: 2021-05-14

## 2021-03-16 RX ORDER — TIZANIDINE 4 MG/1
4 TABLET ORAL EVERY 8 HOURS PRN
Qty: 90 TABLET | Refills: 1 | Status: SHIPPED | OUTPATIENT
Start: 2021-03-16 | End: 2021-06-02 | Stop reason: SDUPTHER

## 2021-03-16 RX ORDER — GABAPENTIN 300 MG/1
300 CAPSULE ORAL 2 TIMES DAILY
Qty: 60 CAPSULE | Refills: 1 | Status: SHIPPED | OUTPATIENT
Start: 2021-03-16 | End: 2021-03-16 | Stop reason: SDUPTHER

## 2021-03-16 RX ORDER — HYDROCODONE BITARTRATE AND ACETAMINOPHEN 5; 325 MG/1; MG/1
1 TABLET ORAL EVERY 6 HOURS PRN
Qty: 120 TABLET | Refills: 0 | Status: SHIPPED | OUTPATIENT
Start: 2021-03-16 | End: 2021-04-14

## 2021-03-16 ASSESSMENT — ENCOUNTER SYMPTOMS
COLOR CHANGE: 0
NAUSEA: 0
VOMITING: 0
DIARRHEA: 0
WHEEZING: 0
RHINORRHEA: 0
CONSTIPATION: 0
CHEST TIGHTNESS: 0
SINUS PRESSURE: 0
BACK PAIN: 1
SORE THROAT: 0
ABDOMINAL PAIN: 0
PHOTOPHOBIA: 0
EYE PAIN: 0
SHORTNESS OF BREATH: 0
COUGH: 0

## 2021-03-16 NOTE — PROGRESS NOTES
[address] [facility] [address]  Dept: [phone]  Dept Fax: [phone]: [phone]    Visit Date: 3/16/2021    Functionality Assessment/Goals Worksheet     On a scale of 0 (Does not Interfere) to 10 (Completely Interferes)     1. Which number describes how during the past week pain has interfered with       the following:  A. General Activity:  9  B. Mood: 10  C. Walking Ability:  9  D. Normal Work (Includes both work outside the home and housework):  9  E. Relations with Other People:   9  F. Sleep:   10  G. Enjoyment of Life:   10    2. Patient Prefers to Take their Pain Medications:     [x]  On a regular basis   []  Only when necessary    []  Does not take pain medications    3. What are the Patient's Goals/Expectations for Visiting Pain Management? []  Learn about my pain    [x]  Receive Medication   []  Physical Therapy     []  Treat Depression   []  Receive Injections    []  Treat Sleep   []  Deal with Anxiety and Stress   []  Treat Opoid Dependence/Addiction   []  Other:    HPI:   Bam Guillen is a 61 y.o. male is here today for    Chief Complaint: Low back pain, Neck pain, Right leg pain and Hip pain    HPI   F/U UDS and Pill Count. + ETOH and took extra, was given 2 week trial of Norco stopped Tramadol. He took all his Norco in 7-10 days. Continues to have low back right SI Right hip RLE pain. He has increased low back RLE pain with standing walking bending lifting twisting turning. He has had TFLESI  Right L4 and L5 with increased RLE  Pain. The procedure helped his back pain about  50% but caused increased RLE radicular pain. He works but is having trouble with increased pain. He is using cane today. Right hip steroid injection 3/15/2021  Was just yesterday will re eval  benefit on March 31 visit.        Lumbar MRI 2/23/2021  FINDINGS:   There is anatomic vertebral body height and alignment. There is small amount of edema in the intervertebral disc at L4-5. There is mixed hyperintense T1 and T2 signal with hyperintense STIR signal at the opposing endplates of W5-8 with associated    osteophytes as evidence for mixture of Modic 1 and Modic 2 degenerative change. Similar, less pronounced, degenerative changes are present at the L2-3 level anteriorly. There are a few small scattered focal areas of hyperintense T1 and T2 signal in the    T12 and L1 vertebral bodies as evidence for hemangiomas. Marrow signal is otherwise within normal limits. The conus terminates in a normal fashion at the L1 level. Paraspinal soft tissues are unremarkable. At T12-L1 there is no significant spinal canal or neuroforaminal stenosis. At L1-2 there is no significant spinal canal stenosis. There is mild bilateral neural foraminal stenosis in association with minimal disc bulge and facet hypertrophy and ligamentum flavum thickening. At L2-3 there is a disc bulge which indents the thecal sac and appears to contact traversing L3 nerve roots bilaterally and contributes to moderate spinal canal stenosis in association with facet hypertrophy and ligamentum flavum thickening. There is    also moderate bilateral neural foraminal stenosis. At L3-4 there is a disc bulge which contributes to moderate spinal canal stenosis in association with prominent facet hypertrophy and ligamentum flavum thickening. There is moderate to severe right and moderate left neural foraminal stenosis. At L4-5 there is a disc bulge which contributes to moderate to severe spinal canal stenosis in association with prominent facet hypertrophy and ligament flavum thickening. There is also moderate bilateral neural foraminal stenosis. At L5-S1 there is a shallow disc bulge which mildly indents thecal sac but does not contact traversing nerve roots.  There is mild to moderate left and moderate right neural foraminal stenosis in association with facet hypertrophy and ligament flavum    thickening.           Impression    Multilevel degenerative changes most pronounced at L4-5 where a disc bulge causes moderate to severe spinal canal stenosis in association with facet hypertrophy and ligamentum flavum thickening and moderate bilateral neural foraminal stenosis. There is    also moderate spinal canal stenosis and up to moderate to severe neuroforaminal stenosis at L3-4.                 Medications reviewed. Patient denies side effects with medications. Patient states he is taking medications as prescribed. Hedenies receiving pain medications from other sources. He denies any ER visits since last visit. Pain scale with out pain medications or at its worst is 9/10. Pain scale with pain medications or at its best is 6/10. Last dose of  Norco  was  3 days ago ran out   Drug screen reviewed from 3/2021 and was appropriate  Pill count was not completed today and  Out   Patient does not have naloxone available at home. Patient has not required use of naloxone at home since last office visit. The patienthas No Known Allergies. Subjective:      Review of Systems   Constitutional: Positive for activity change. Negative for appetite change, chills, diaphoresis, fatigue, fever and unexpected weight change. HENT: Negative for congestion, ear pain, hearing loss, mouth sores, nosebleeds, rhinorrhea, sinus pressure and sore throat. Eyes: Negative for photophobia, pain and visual disturbance. Respiratory: Negative for cough, chest tightness, shortness of breath and wheezing. Cardiovascular: Negative for chest pain and palpitations. Gastrointestinal: Negative for abdominal pain, constipation, diarrhea, nausea and vomiting. Endocrine: Negative for cold intolerance, heat intolerance, polydipsia, polyphagia and polyuria.    Genitourinary: Negative for decreased urine volume, difficulty urinating, frequency and hematuria. Musculoskeletal: Positive for arthralgias, back pain, gait problem, myalgias, neck pain and neck stiffness. Negative for joint swelling. Skin: Negative for color change and rash. Allergic/Immunologic: Negative for food allergies and immunocompromised state. Neurological: Negative for dizziness, tremors, seizures, syncope, facial asymmetry, speech difficulty, weakness, light-headedness, numbness and headaches. Hematological: Does not bruise/bleed easily. Psychiatric/Behavioral: Negative for agitation, behavioral problems, confusion, decreased concentration, dysphoric mood, hallucinations, self-injury, sleep disturbance and suicidal ideas. The patient is not nervous/anxious and is not hyperactive. Objective:     Vitals:    03/16/21 1409 03/16/21 1416   BP: (!) 144/84 (!) 144/84   Site: Left Upper Arm Right Upper Arm   Position: Sitting    Cuff Size: Medium Adult    Pulse: 80    Temp: 97.8 °F (36.6 °C)    TempSrc: Temporal    Weight: 197 lb (89.4 kg)    Height: 6' 2\" (1.88 m)        Physical Exam  Vitals signs and nursing note reviewed. Constitutional:       General: He is not in acute distress. Appearance: He is well-developed. He is not diaphoretic. HENT:      Head: Normocephalic and atraumatic. Right Ear: External ear normal.      Left Ear: External ear normal.      Nose: Nose normal.      Mouth/Throat:      Pharynx: No oropharyngeal exudate. Eyes:      General: No scleral icterus. Right eye: No discharge. Left eye: No discharge. Conjunctiva/sclera: Conjunctivae normal.      Pupils: Pupils are equal, round, and reactive to light. Neck:      Musculoskeletal: Full passive range of motion without pain, normal range of motion and neck supple. Normal range of motion. No edema, erythema, neck rigidity or muscular tenderness. Thyroid: No thyromegaly. Cardiovascular:      Rate and Rhythm: Normal rate and regular rhythm.       Heart sounds: Normal heart sounds. No murmur. No friction rub. No gallop. Pulmonary:      Effort: Pulmonary effort is normal. No respiratory distress. Breath sounds: Examination of the right-upper field reveals wheezing. Examination of the left-upper field reveals wheezing. Wheezing present. No rales. Chest:      Chest wall: No tenderness. Abdominal:      General: Bowel sounds are normal. There is no distension. Palpations: Abdomen is soft. Tenderness: There is no abdominal tenderness. There is no guarding or rebound. Musculoskeletal:      Right shoulder: He exhibits tenderness. He exhibits normal range of motion. Left shoulder: He exhibits normal range of motion and no tenderness. Right elbow: Tenderness found. Right wrist: He exhibits tenderness. Right hip: He exhibits decreased range of motion, decreased strength, tenderness and bony tenderness. Left hip: He exhibits no tenderness. Right knee: He exhibits normal range of motion. Tenderness found. Left knee: He exhibits normal range of motion. No tenderness found. Right ankle: He exhibits no swelling. Left ankle: He exhibits no swelling. Cervical back: He exhibits decreased range of motion, tenderness, bony tenderness, pain and spasm. Thoracic back: He exhibits no tenderness. Lumbar back: He exhibits decreased range of motion, tenderness, bony tenderness, pain and spasm. Right upper arm: He exhibits tenderness. Right forearm: He exhibits tenderness. Right hand: He exhibits tenderness. Decreased sensation noted. Right upper leg: He exhibits tenderness. Right lower leg: He exhibits no swelling. Left lower leg: He exhibits no swelling. Skin:     General: Skin is warm. Coloration: Skin is not pale. Findings: No erythema or rash. Neurological:      Mental Status: He is alert and oriented to person, place, and time. He is not disoriented.       Cranial Nerves: No cranial nerve deficit. Sensory: No sensory deficit. Motor: No atrophy or abnormal muscle tone. Coordination: Coordination normal.      Gait: Gait abnormal.      Deep Tendon Reflexes: Babinski sign absent on the right side. Babinski sign absent on the left side. Reflex Scores:       Tricep reflexes are 2+ on the right side and 2+ on the left side. Bicep reflexes are 2+ on the right side and 2+ on the left side. Brachioradialis reflexes are 2+ on the right side and 2+ on the left side. Patellar reflexes are 2+ on the right side and 2+ on the left side. Achilles reflexes are 1+ on the right side and 2+ on the left side. Comments: SLR -POS RLE   Psychiatric:         Attention and Perception: Attention normal. He is attentive. Mood and Affect: Mood normal. Mood is not anxious or depressed. Affect is not labile, blunt, angry or inappropriate. Speech: Speech normal. He is communicative. Speech is not rapid and pressured, delayed, slurred or tangential.         Behavior: Behavior normal. Behavior is not agitated, slowed, aggressive, withdrawn, hyperactive or combative. Behavior is cooperative. Thought Content: Thought content normal. Thought content is not paranoid or delusional. Thought content does not include homicidal or suicidal ideation. Thought content does not include homicidal or suicidal plan. Cognition and Memory: Cognition normal. Memory is not impaired. He does not exhibit impaired recent memory or impaired remote memory. Judgment: Judgment is not impulsive or inappropriate. IGNACIA test: +  Yeomans test:+  Gaenslen test: +     Assessment:     1. Spinal stenosis of lumbar region without neurogenic claudication    2. Lumbar spondylosis    3. Lumbosacral radiculitis    4. Chronic pain syndrome    5. Primary osteoarthritis of right hip    6. Right leg pain    7. Chronic, continuous use of opioids    8.  Neuropathy            Plan: · OARRS reviewed. Current MED: 20  · Patient was not offered naloxone for home. · Discussed long term side effects of medications, tolerance, dependency and addiction. · Previous UDS reviewed  · UDS preformed today for compliance. · Patient told can not receive any pain medications from any other source. · No evidence of abuse, diversion or aberrant behavior.  Medications and/or procedures to improve function and quality of life- patient understanding with this and that may not be pain free   Discussed with patient about safe storage of medications at home   Discussed possible weaning of medication dosing dependent on treatment/procedure results.  Testing: reviewed Lumbar MRI    Procedures: LESI L4 #1 intralaminar    Discussed with patient about risks with procedure including infection, reaction to medication, increased pain, or bleeding.  Medications:Norco 5 prn q 6 hrs educated do not take extra, call office first if having increased pain. Stop Flexeril. Trial Zanaflex medication reviewed   If patient is on blood thinners will need approval to hold: N/A      Meds. Prescribed:   Orders Placed This Encounter   Medications    DISCONTD: HYDROcodone-acetaminophen (NORCO) 5-325 MG per tablet     Sig: Take 1 tablet by mouth every 6 hours as needed for Pain for up to 30 days. Dispense:  120 tablet     Refill:  0     Reduce doses taken as pain becomes manageable    DISCONTD: tiZANidine (ZANAFLEX) 4 MG tablet     Sig: Take 1 tablet by mouth every 8 hours as needed (spasms)     Dispense:  90 tablet     Refill:  1    DISCONTD: gabapentin (NEURONTIN) 300 MG capsule     Sig: Take 1 capsule by mouth 2 times daily for 30 days. Dispense:  60 capsule     Refill:  1    HYDROcodone-acetaminophen (NORCO) 5-325 MG per tablet     Sig: Take 1 tablet by mouth every 6 hours as needed for Pain for up to 30 days.      Dispense:  120 tablet     Refill:  0     Reduce doses taken as pain becomes manageable    gabapentin (NEURONTIN) 300 MG capsule     Sig: Take 1 capsule by mouth 2 times daily for 30 days. Dispense:  60 capsule     Refill:  1    tiZANidine (ZANAFLEX) 4 MG tablet     Sig: Take 1 tablet by mouth every 8 hours as needed (spasms)     Dispense:  90 tablet     Refill:  1       Return for LESI @L4#1, Follow up after procedure 4 weeks from last procedure on 3/15/2021.                Electronically signed by FRANK Jiménez CNP on3/16/2021 at 3:05 PM

## 2021-03-16 NOTE — TELEPHONE ENCOUNTER
Pt. In office requesting refill of pain medications. Did advise him that Daryl Tran  would  not prescribe pain medication due to continuous alcohol use and taking extra pain medication without being advised by provider. Requesting to see the provider today regarding this. States he can do a urine test now since he hasn't drank. Follow up to discussed medication scheduled today @ 2:40pm. Verbalized understanding.

## 2021-03-26 ENCOUNTER — HOSPITAL ENCOUNTER (EMERGENCY)
Age: 61
Discharge: HOME | End: 2021-03-26
Attending: EMERGENCY MEDICINE
Payer: MEDICARE

## 2021-03-26 VITALS
DIASTOLIC BLOOD PRESSURE: 80 MMHG | WEIGHT: 198 LBS | TEMPERATURE: 98.8 F | SYSTOLIC BLOOD PRESSURE: 131 MMHG | OXYGEN SATURATION: 98 % | BODY MASS INDEX: 25.41 KG/M2 | RESPIRATION RATE: 18 BRPM | HEIGHT: 74 IN | HEART RATE: 108 BPM

## 2021-03-26 DIAGNOSIS — M54.16 LUMBAR RADICULOPATHY: Primary | ICD-10-CM

## 2021-03-26 PROCEDURE — 6370000000 HC RX 637 (ALT 250 FOR IP): Performed by: EMERGENCY MEDICINE

## 2021-03-26 PROCEDURE — 96374 THER/PROPH/DIAG INJ IV PUSH: CPT

## 2021-03-26 PROCEDURE — 6360000002 HC RX W HCPCS: Performed by: EMERGENCY MEDICINE

## 2021-03-26 PROCEDURE — 99285 EMERGENCY DEPT VISIT HI MDM: CPT

## 2021-03-26 RX ORDER — CYCLOBENZAPRINE HCL 10 MG
10 TABLET ORAL ONCE
Status: COMPLETED | OUTPATIENT
Start: 2021-03-26 | End: 2021-03-26

## 2021-03-26 RX ORDER — KETOROLAC TROMETHAMINE 30 MG/ML
30 INJECTION, SOLUTION INTRAMUSCULAR; INTRAVENOUS ONCE
Status: COMPLETED | OUTPATIENT
Start: 2021-03-26 | End: 2021-03-26

## 2021-03-26 RX ADMIN — KETOROLAC TROMETHAMINE 30 MG: 30 INJECTION, SOLUTION INTRAMUSCULAR at 03:28

## 2021-03-26 RX ADMIN — CYCLOBENZAPRINE HYDROCHLORIDE 10 MG: 10 TABLET, FILM COATED ORAL at 03:28

## 2021-03-26 ASSESSMENT — ENCOUNTER SYMPTOMS
EYE ITCHING: 0
STRIDOR: 0
PHOTOPHOBIA: 0
EYE REDNESS: 0
EYE PAIN: 0
BACK PAIN: 1
CONSTIPATION: 0
RHINORRHEA: 0
EYE DISCHARGE: 0
SORE THROAT: 0
CHEST TIGHTNESS: 0
DIARRHEA: 0
SHORTNESS OF BREATH: 0
ABDOMINAL PAIN: 0
WHEEZING: 0
COUGH: 0
ABDOMINAL DISTENTION: 0
NAUSEA: 0
VOMITING: 0

## 2021-03-26 ASSESSMENT — PAIN SCALES - GENERAL
PAINLEVEL_OUTOF10: 10
PAINLEVEL_OUTOF10: 5

## 2021-03-26 NOTE — ED NOTES
Pt requesting work slip. Medicated per MAR. Call light within reach.  Will monitor     Anny Horn, PINA  03/26/21 1693

## 2021-03-26 NOTE — ED NOTES
Pt arrives to ER with c/o right leg pain. Pt reports he received a epidural for his back pain 2 weeks ago. Pt reports \"they missed the nerve\". He reports he has had right leg pain since. Pt had order for muscle relaxer but reports \"that dont do no good\". Reports pain 10/10. Will monitor.       Roanldo Dyson RN  03/26/21 9052

## 2021-03-26 NOTE — ED PROVIDER NOTES
[facility]  EMERGENCY DEPARTMENT ENCOUNTER      PATIENT NAME: Steven Sharma  MRN: [id number]  : 1960  MATA: 3/26/2021  PROVIDER: Tony Rangel MD      CHIEF COMPLAINT       Chief Complaint   Patient presents with    Leg Pain       Nurses Notes reviewed and I agreeexcept as noted in the HPI. HISTORY OF PRESENT ILLNESS    Steven Sharma is a 61 y.o. male who presents to Emergency Department with right side leg pain. Onset: Gradual  Location: At home  Severity: Moderate  Timing: Last two weeks  Modifying factors: Worse on walking and bending forward  Quality: right lower back pain and right posterior leg pain  Radiation: down to below right knee  Duration: Persistent  Context: Past medical history remarkable for bipolar disorder, COPD, hypertension, lumbar radiculopathy and right hip osteoarthritis status post right hip injection on 3/15/2021. He states he has Zanaflex at home. He feels pain is getting worse in the last 24 hours. No change of bowel and bladder functions. This HPI was provided by the patient. REVIEW OF SYSTEMS     Review of Systems   Constitutional: Negative for activity change, appetite change, chills, fatigue, fever and unexpected weight change. HENT: Negative for congestion, ear discharge, ear pain, hearing loss, nosebleeds, rhinorrhea and sore throat. Eyes: Negative for photophobia, pain, discharge, redness and itching. Respiratory: Negative for cough, chest tightness, shortness of breath, wheezing and stridor. Cardiovascular: Negative for chest pain, palpitations and leg swelling. Gastrointestinal: Negative for abdominal distention, abdominal pain, constipation, diarrhea, nausea and vomiting. Endocrine: Negative for cold intolerance, heat intolerance, polydipsia and polyphagia. Genitourinary: Negative for dysuria, flank pain, frequency and hematuria. Musculoskeletal: Positive for arthralgias, back pain and myalgias.  Negative for gait problem, neck pain and neck stiffness. Skin: Negative for pallor, rash and wound. Allergic/Immunologic: Negative for environmental allergies and food allergies. Neurological: Negative for dizziness, tremors, syncope, weakness and headaches. Psychiatric/Behavioral: Negative for agitation, behavioral problems, confusion, self-injury, sleep disturbance and suicidal ideas. All other systems reviewed and are negative. PAST MEDICAL HISTORY     Past Medical History:   Diagnosis Date    [facility] ([facility] [address]     COPD (chronic obstructive pulmonary disease) ([facility] [address]     Hypertension     Lumbar radiculopathy        SURGICAL HISTORY       Past Surgical History:   Procedure Laterality Date    COLONOSCOPY Left 6/21/2019    COLONOSCOPY DIAGNOSTIC performed by Elie Keen MD at [place] Right     HIP SURGERY Right 3/15/2021    right hip steroid injection no sedation performed by Jez Nguyen MD at [place] [address] Right 2/8/2021    RIGHT TFLESI @ L4 and L5. performed by Jez Nguyen MD at [facility] Left 6/21/2019    EGD BIOPSY performed by Elie Keen MD at [facility]       Previous Medications    ACETAMINOPHEN (APAP EXTRA STRENGTH) 500 MG TABLET    Take 1 tablet by mouth every 6 hours as needed for Pain    ALBUTEROL SULFATE  (90 BASE) MCG/ACT INHALER    2 puffs every 4-6 hours with spacer for wheezing and chest congestionwhen necessary    CALCIUM POLYCARBOPHIL (FIBER) 625 MG TABS    Take 1 tablet by mouth daily    FLUOXETINE (PROZAC) 20 MG CAPSULE    Take 20 mg by mouth daily. GABAPENTIN (NEURONTIN) 300 MG CAPSULE    Take 1 capsule by mouth 2 times daily for 30 days. HYDROCODONE-ACETAMINOPHEN (NORCO) 5-325 MG PER TABLET    Take 1 tablet by mouth every 6 hours as needed for Pain for up to 30 days.     IBUPROFEN (IBU) 400 MG TABLET    Take 1 tablet by mouth every 6 hours as needed for Pain    KETOROLAC (TORADOL) 10 MG TABLET    Take 1 tablet by mouth every 6 hours as needed for Pain    LACTASE (LACTAID) 3000 UNITS TABLET    Take 1 tablet by mouth 3 times daily (with meals)    METOPROLOL (TOPROL-XL) 50 MG XL TABLET    Take 50 mg by mouth daily. TIZANIDINE (ZANAFLEX) 4 MG TABLET    Take 1 tablet by mouth every 8 hours as needed (spasms)       ALLERGIES     Patient has no known allergies. FAMILY HISTORY     He indicated that his mother is alive. He indicated that his father is . family history includes Diabetes in his father and mother. SOCIAL HISTORY      reports that he has been smoking cigarettes. He started smoking about 37 years ago. He has a 7.50 pack-year smoking history. He has never used smokeless tobacco. He reports current alcohol use. He reports that he does not use drugs. PHYSICAL EXAM     INITIAL VITALS:    height is 6' 2\" (1.88 m) and weight is 198 lb (89.8 kg). His temperature is 98.8 °F (37.1 °C). His blood pressure is 131/80 and his pulse is 108. His respiration is 18 and oxygen saturation is 98%. Physical Exam  Vitals signs and nursing note reviewed. Constitutional:       Appearance: He is well-developed. He is not diaphoretic. HENT:      Head: Normocephalic and atraumatic. Nose: Nose normal.   Eyes:      General: No scleral icterus. Right eye: No discharge. Left eye: No discharge. Conjunctiva/sclera: Conjunctivae normal.      Pupils: Pupils are equal, round, and reactive to light. Neck:      Musculoskeletal: Normal range of motion and neck supple. Vascular: No JVD. Trachea: No tracheal deviation. Cardiovascular:      Rate and Rhythm: Regular rhythm. Tachycardia present. Heart sounds: Normal heart sounds. No murmur. No friction rub. No gallop. Pulmonary:      Effort: Pulmonary effort is normal. No respiratory distress.       Breath sounds: Normal breath sounds. No stridor. No wheezing or rales. Chest:      Chest wall: No tenderness. Abdominal:      General: Bowel sounds are normal. There is no distension. Palpations: Abdomen is soft. There is no mass. Tenderness: There is no abdominal tenderness. There is no guarding or rebound. Hernia: No hernia is present. Musculoskeletal:         General: No tenderness or deformity. Lymphadenopathy:      Cervical: No cervical adenopathy. Skin:     General: Skin is warm and dry. Capillary Refill: Capillary refill takes less than 2 seconds. Coloration: Skin is not pale. Findings: No erythema or rash. Neurological:      Mental Status: He is alert and oriented to person, place, and time. Cranial Nerves: No cranial nerve deficit. Sensory: No sensory deficit. Motor: No abnormal muscle tone. Coordination: Coordination normal.      Deep Tendon Reflexes: Reflexes normal.      Comments: Right lower extremity straight leg raise test is positive at 20 degrees. Psychiatric:         Behavior: Behavior normal.         Thought Content: Thought content normal.         Judgment: Judgment normal.         MEDICAL DEDISION MAKINGS:   3:47 AM: Patient is seen and evaluated in a timely fashion. ED nurse's documentations are reviewed. DIFFERENTIAL DIAGNOSIS:  Lumbar radiculopathy, back strain, hamstring strain, osteoarthritis of right hip    RATIONALE:     He is slightly tacky, he says this is due to pain and anxiety. Patient was medicated with following ED medications. Medications   ketorolac (TORADOL) injection 30 mg (30 mg Intravenous Given 3/26/21 0328)   cyclobenzaprine (FLEXERIL) tablet 10 mg (10 mg Oral Given 3/26/21 0328)     Pain improved on reassessment. He has Zanaflex at home. No fever, no chills, no lumbar midline tenderness, no red flags of back pain. Discharged with pain management, Dr. Ronnie Whittaker as scheduled on 3/31/2021.

## 2021-03-31 ENCOUNTER — HOSPITAL ENCOUNTER (OUTPATIENT)
Dept: GENERAL RADIOLOGY | Age: 61
Discharge: HOME | End: 2021-03-31
Payer: OTHER GOVERNMENT

## 2021-03-31 ENCOUNTER — OFFICE VISIT (OUTPATIENT)
Dept: PHYSICAL MEDICINE AND REHAB | Age: 61
End: 2021-03-31
Payer: OTHER GOVERNMENT

## 2021-03-31 ENCOUNTER — HOSPITAL ENCOUNTER (OUTPATIENT)
Age: 61
Discharge: HOME | End: 2021-03-31
Payer: OTHER GOVERNMENT

## 2021-03-31 VITALS
TEMPERATURE: 97.4 F | DIASTOLIC BLOOD PRESSURE: 80 MMHG | BODY MASS INDEX: 25.41 KG/M2 | WEIGHT: 198 LBS | SYSTOLIC BLOOD PRESSURE: 130 MMHG | HEIGHT: 74 IN

## 2021-03-31 DIAGNOSIS — M79.604 RIGHT LEG PAIN: ICD-10-CM

## 2021-03-31 DIAGNOSIS — M47.816 LUMBAR SPONDYLOSIS: ICD-10-CM

## 2021-03-31 DIAGNOSIS — M54.2 NECK PAIN: ICD-10-CM

## 2021-03-31 DIAGNOSIS — M48.061 SPINAL STENOSIS OF LUMBAR REGION WITHOUT NEUROGENIC CLAUDICATION: ICD-10-CM

## 2021-03-31 DIAGNOSIS — G89.4 CHRONIC PAIN SYNDROME: ICD-10-CM

## 2021-03-31 DIAGNOSIS — G62.9 NEUROPATHY: ICD-10-CM

## 2021-03-31 DIAGNOSIS — M54.17 LUMBOSACRAL RADICULITIS: Primary | ICD-10-CM

## 2021-03-31 DIAGNOSIS — M16.11 PRIMARY OSTEOARTHRITIS OF RIGHT HIP: ICD-10-CM

## 2021-03-31 PROCEDURE — 72040 X-RAY EXAM NECK SPINE 2-3 VW: CPT

## 2021-03-31 PROCEDURE — 99214 OFFICE O/P EST MOD 30 MIN: CPT | Performed by: NURSE PRACTITIONER

## 2021-03-31 ASSESSMENT — ENCOUNTER SYMPTOMS: BACK PAIN: 1

## 2021-03-31 NOTE — PROGRESS NOTES
[address] [facility] [address]  Dept: [phone]  Dept Fax: [phone]: [phone]    Visit Date: 3/31/2021    Functionality Assessment/Goals Worksheet     On a scale of 0 (Does not Interfere) to 10 (Completely Interferes)     1. Which number describes how during the past week pain has interfered with       the following:  A. General Activity:  10  B. Mood: 10  C. Walking Ability:  10  D. Normal Work (Includes both work outside the home and housework):  10  E. Relations with Other People:   10  F. Sleep:   10  G. Enjoyment of Life:   10    2. Patient Prefers to Take their Pain Medications:     [x]  On a regular basis   [x]  Only when necessary    []  Does not take pain medications    3. What are the Patient's Goals/Expectations for Visiting Pain Management? []  Learn about my pain    [x]  Receive Medication   []  Physical Therapy     []  Treat Depression   [x]  Receive Injections    []  Treat Sleep   []  Deal with Anxiety and Stress   []  Treat Opoid Dependence/Addiction   []  Other:      HPI:   Norma Fuentes is a [address] y.o. male is here today for    Chief Complaint: Right leg pain, neck apin     HPI   2 week FU. Patient here for complaint of pain in right lower back radiating in right lower extremity from right side hip areas and radiates down [place] anterior leg to t about right knee- sharp, pain with tingling and increased with walking. Pain is mainly in right lower extremity 90% bothersome. Also complaining of pain in right side of neck sharp and aching aching pain. Norco prn continues to help decrease pain to a tolerable level. Medications reviewed. Patient denies side effects with medications. Patient states he is taking medications as prescribed. Hedenies receiving pain medications from other sources.  He had 1 ER visit on 3/26/2021 for right leg pain any ER visits since last visit. Pain scale with out pain medications or at its worst is 10/10. Pain scale with pain medications or at its best is 0/10. Last dose of Norco was yesterday  Drug screen reviewed from 3/16/2021 and appropriate for note    Pill count was not completed today and instructed to bring into this clinic. Patient does not have naloxone available at home. Patient has not required use of naloxone at home since last office visit. The patienthas No Known Allergies. Subjective:      Review of Systems   Constitutional: Negative. Musculoskeletal: Positive for arthralgias, back pain, myalgias, neck pain and neck stiffness. Ambulating with strait cane    Neurological: Positive for weakness and numbness. Psychiatric/Behavioral: Negative. Objective:     Vitals:    03/31/21 0948   BP: 130/80   Temp: 97.4 °F (36.3 °C)   Weight: 198 lb (89.8 kg)   Height: 6' 2\" (1.88 m)       Physical Exam  Vitals signs and nursing note reviewed. Constitutional:       General: He is not in acute distress. Appearance: He is well-developed. He is not diaphoretic. HENT:      Head: Normocephalic and atraumatic. Right Ear: External ear normal.      Left Ear: External ear normal.      Nose: Nose normal.      Mouth/Throat:      Pharynx: No oropharyngeal exudate. Eyes:      General: No scleral icterus. Right eye: No discharge. Left eye: No discharge. Conjunctiva/sclera: Conjunctivae normal.      Pupils: Pupils are equal, round, and reactive to light. Neck:      Musculoskeletal: Full passive range of motion without pain, normal range of motion and neck supple. Normal range of motion. No edema, erythema, neck rigidity or muscular tenderness. Thyroid: No thyromegaly. Cardiovascular:      Rate and Rhythm: Normal rate and regular rhythm. Heart sounds: Normal heart sounds. No murmur. No friction rub. No gallop.     Pulmonary:      Effort: Pulmonary effort is normal. No respiratory distress. Breath sounds: Examination of the right-upper field reveals wheezing. Examination of the left-upper field reveals wheezing. Wheezing present. No rales. Chest:      Chest wall: No tenderness. Abdominal:      General: Bowel sounds are normal. There is no distension. Palpations: Abdomen is soft. Tenderness: There is no abdominal tenderness. There is no guarding or rebound. Musculoskeletal:         General: Swelling and tenderness present. Right shoulder: He exhibits tenderness. He exhibits normal range of motion. Left shoulder: He exhibits normal range of motion and no tenderness. Right elbow: Tenderness found. Right wrist: He exhibits tenderness. Right hip: He exhibits decreased range of motion, decreased strength, tenderness and bony tenderness. Left hip: He exhibits no tenderness. Right knee: He exhibits normal range of motion. Tenderness found. Left knee: He exhibits normal range of motion. No tenderness found. Right ankle: He exhibits no swelling. Left ankle: He exhibits no swelling. Cervical back: He exhibits decreased range of motion, tenderness, bony tenderness, pain and spasm. Thoracic back: He exhibits no tenderness. Lumbar back: He exhibits decreased range of motion, tenderness, bony tenderness, pain and spasm. Back:       Right upper arm: He exhibits tenderness. Right forearm: He exhibits tenderness. Right hand: He exhibits tenderness. Decreased sensation noted. Right upper leg: He exhibits tenderness. Right lower leg: He exhibits no swelling. No edema. Left lower leg: He exhibits no swelling. No edema. Skin:     General: Skin is warm. Coloration: Skin is not pale. Findings: No erythema or rash. Neurological:      General: No focal deficit present. Mental Status: He is alert and oriented to person, place, and time. He is not disoriented. Cranial Nerves: No cranial nerve deficit. Sensory: Sensory deficit present. Motor: Weakness present. No atrophy or abnormal muscle tone. Coordination: Romberg sign positive. Coordination normal.      Gait: Gait abnormal.      Deep Tendon Reflexes: Babinski sign absent on the right side. Babinski sign absent on the left side. Reflex Scores:       Tricep reflexes are 2+ on the right side and 2+ on the left side. Bicep reflexes are 2+ on the right side and 2+ on the left side. Brachioradialis reflexes are 2+ on the right side and 2+ on the left side. Patellar reflexes are 2+ on the right side and 2+ on the left side. Achilles reflexes are 1+ on the right side and 2+ on the left side. Comments: SLR -POS RLE at 60 degrees    Psychiatric:         Attention and Perception: Attention normal. He is attentive. Mood and Affect: Mood normal. Mood is not anxious or depressed. Affect is not labile, blunt, angry or inappropriate. Speech: Speech normal. He is communicative. Speech is not rapid and pressured, delayed, slurred or tangential.         Behavior: Behavior normal. Behavior is not agitated, slowed, aggressive, withdrawn, hyperactive or combative. Behavior is cooperative. Thought Content: Thought content normal. Thought content is not paranoid or delusional. Thought content does not include homicidal or suicidal ideation. Thought content does not include homicidal or suicidal plan. Cognition and Memory: Cognition normal. Memory is not impaired. He does not exhibit impaired recent memory or impaired remote memory. Judgment: Judgment is not impulsive or inappropriate. IGNACIA test: + right side   Yeomans test: + right side   Gaenslen test: + right side     Assessment:     1. Lumbosacral radiculitis    2. Spinal stenosis of lumbar region without neurogenic claudication    3. Right leg pain    4. Lumbar spondylosis    5.  Neuropathy

## 2021-04-05 ENCOUNTER — HOSPITAL ENCOUNTER (EMERGENCY)
Age: 61
Discharge: HOME | End: 2021-04-05
Attending: EMERGENCY MEDICINE
Payer: MEDICARE

## 2021-04-05 VITALS
WEIGHT: 195 LBS | HEIGHT: 74 IN | RESPIRATION RATE: 18 BRPM | BODY MASS INDEX: 25.03 KG/M2 | HEART RATE: 82 BPM | DIASTOLIC BLOOD PRESSURE: 89 MMHG | OXYGEN SATURATION: 98 % | TEMPERATURE: 97.9 F | SYSTOLIC BLOOD PRESSURE: 129 MMHG

## 2021-04-05 DIAGNOSIS — M54.50 ACUTE EXACERBATION OF CHRONIC LOW BACK PAIN: Primary | ICD-10-CM

## 2021-04-05 DIAGNOSIS — G89.29 ACUTE EXACERBATION OF CHRONIC LOW BACK PAIN: Primary | ICD-10-CM

## 2021-04-05 PROCEDURE — 96372 THER/PROPH/DIAG INJ SC/IM: CPT

## 2021-04-05 PROCEDURE — 6360000002 HC RX W HCPCS: Performed by: EMERGENCY MEDICINE

## 2021-04-05 PROCEDURE — 99283 EMERGENCY DEPT VISIT LOW MDM: CPT

## 2021-04-05 RX ORDER — KETOROLAC TROMETHAMINE 30 MG/ML
30 INJECTION, SOLUTION INTRAMUSCULAR; INTRAVENOUS ONCE
Status: COMPLETED | OUTPATIENT
Start: 2021-04-05 | End: 2021-04-05

## 2021-04-05 RX ORDER — METHYLPREDNISOLONE SODIUM SUCCINATE 125 MG/2ML
60 INJECTION, POWDER, LYOPHILIZED, FOR SOLUTION INTRAMUSCULAR; INTRAVENOUS ONCE
Status: COMPLETED | OUTPATIENT
Start: 2021-04-05 | End: 2021-04-05

## 2021-04-05 RX ADMIN — KETOROLAC TROMETHAMINE 30 MG: 30 INJECTION, SOLUTION INTRAMUSCULAR; INTRAVENOUS at 01:30

## 2021-04-05 RX ADMIN — METHYLPREDNISOLONE SODIUM SUCCINATE 60 MG: 125 INJECTION, POWDER, LYOPHILIZED, FOR SOLUTION INTRAMUSCULAR; INTRAVENOUS at 01:26

## 2021-04-05 ASSESSMENT — ENCOUNTER SYMPTOMS
SHORTNESS OF BREATH: 0
ABDOMINAL DISTENTION: 0
WHEEZING: 0
VOMITING: 0
CONSTIPATION: 0
CHEST TIGHTNESS: 0
COUGH: 0
DIARRHEA: 0
VOICE CHANGE: 0
EYE PAIN: 0
RHINORRHEA: 0
SINUS PRESSURE: 0
TROUBLE SWALLOWING: 0
BLOOD IN STOOL: 0
EYE DISCHARGE: 0
EYE REDNESS: 0
ABDOMINAL PAIN: 0
EYE ITCHING: 0
PHOTOPHOBIA: 0
CHOKING: 0
SORE THROAT: 0
NAUSEA: 0
BACK PAIN: 1

## 2021-04-05 ASSESSMENT — PAIN SCALES - GENERAL
PAINLEVEL_OUTOF10: 10
PAINLEVEL_OUTOF10: 10

## 2021-04-05 ASSESSMENT — PAIN DESCRIPTION - LOCATION: LOCATION: NECK;LEG

## 2021-04-05 ASSESSMENT — PAIN DESCRIPTION - ORIENTATION: ORIENTATION: RIGHT

## 2021-04-05 NOTE — ED TRIAGE NOTES
Pt presents to the ED today with c/o right sided neck and leg pain. Pt states he is supposed to have a procedure on April 12th and pt was Rx muscle relaxer, Norco, and gabapentin for the pain until the procedure. Pt states that he has not had a norco since Friday because they were stolen from him. Pt states he lives in a group home. Pt states he does have his muscle relaxer and he took two three hours ago PTA. Pt alert and oriented. Pt breathing easy and unlabored. Pt tells RN that Maribeth Garcia will not do for his pain\". This RN helps make pt comfortable in cot and lights off for comfort. VS obtained.

## 2021-04-05 NOTE — ED PROVIDER NOTES
[facility]  eMERGENCY dEPARTMENT eNCOUnter          [address] [facility]       Chief Complaint   Patient presents with    Neck Pain    Leg Pain     right    Medication Refill       Nurses Notes reviewed and I agree except as noted in the HPI. HISTORY OF PRESENT ILLNESS    Jenny Persaud is a 61 y.o. male who presents chronic low back pain. Patient states that his low back pain is worsened. Patient is seeing a pain management specialist.  He is also seeing another physician who prescribed him Norco on the 16th. I told him he should be taking that at home at which point he states to me that he thinks that they were stolen. He is wishing to have a refill of this prescription. I politely told the patient that we could not refill that prescription. He would need to make out a police report first.  And then he would have to go back to his caregiver who gave him the prescription. Patient is a chronic pain patient. It was discussed with him the fact that he will be getting Toradol Flexeril and Solu-Medrol for his pain as usual.  Patient understood and agreed. Patient ambulated here from home. REVIEW OF SYSTEMS     Review of Systems   Constitutional: Negative for activity change, appetite change, diaphoresis, fatigue and unexpected weight change. HENT: Negative for congestion, ear discharge, ear pain, hearing loss, rhinorrhea, sinus pressure, sore throat, trouble swallowing and voice change. Eyes: Negative for photophobia, pain, discharge, redness and itching. Respiratory: Negative for cough, choking, chest tightness, shortness of breath and wheezing. Cardiovascular: Negative for chest pain, palpitations and leg swelling. Gastrointestinal: Negative for abdominal distention, abdominal pain, blood in stool, constipation, diarrhea, nausea and vomiting. Endocrine: Negative for polydipsia, polyphagia and polyuria.    Genitourinary: Negative for decreased urine volume, difficulty urinating, dysuria, enuresis, frequency, hematuria and urgency. Musculoskeletal: Positive for back pain and myalgias. Negative for arthralgias, gait problem, neck pain and neck stiffness. Skin: Negative for pallor and rash. Allergic/Immunologic: Negative for immunocompromised state. Neurological: Negative for dizziness, tremors, seizures, syncope, facial asymmetry, weakness, light-headedness, numbness and headaches. Hematological: Negative for adenopathy. Does not bruise/bleed easily. Psychiatric/Behavioral: Negative for agitation, hallucinations and suicidal ideas. The patient is not nervous/anxious. PAST MEDICAL HISTORY    has a past medical history of Bipolar 1 disorder ([facility] [address] COPD (chronic obstructive pulmonary disease) ([facility] [address] Hypertension, and Lumbar radiculopathy. SURGICAL HISTORY      has a past surgical history that includes Foot surgery (Right); Tonsillectomy; Colonoscopy (Left, 6/21/2019); Upper gastrointestinal endoscopy (Left, 6/21/2019); Pain management procedure (Right, 2/8/2021); and hip surgery (Right, 3/15/2021). CURRENT MEDICATIONS       Previous Medications    ACETAMINOPHEN (APAP EXTRA STRENGTH) 500 MG TABLET    Take 1 tablet by mouth every 6 hours as needed for Pain    ALBUTEROL SULFATE  (90 BASE) MCG/ACT INHALER    2 puffs every 4-6 hours with spacer for wheezing and chest congestionwhen necessary    CALCIUM POLYCARBOPHIL (FIBER) 625 MG TABS    Take 1 tablet by mouth daily    FLUOXETINE (PROZAC) 20 MG CAPSULE    Take 20 mg by mouth daily. GABAPENTIN (NEURONTIN) 300 MG CAPSULE    Take 1 capsule by mouth 2 times daily for 30 days. HYDROCODONE-ACETAMINOPHEN (NORCO) 5-325 MG PER TABLET    Take 1 tablet by mouth every 6 hours as needed for Pain for up to 30 days.     IBUPROFEN (IBU) 400 MG TABLET    Take 1 tablet by mouth every 6 hours as needed for Pain    KETOROLAC (TORADOL) 10 MG TABLET    Take 1 tablet by mouth every 6 hours as needed for Pain    LACTASE (LACTAID) 3000 UNITS TABLET    Take 1 tablet by mouth 3 times daily (with meals)    METOPROLOL (TOPROL-XL) 50 MG XL TABLET    Take 50 mg by mouth daily. TIZANIDINE (ZANAFLEX) 4 MG TABLET    Take 1 tablet by mouth every 8 hours as needed (spasms)       ALLERGIES     has No Known Allergies. FAMILY HISTORY     He indicated that his mother is alive. He indicated that his father is . family history includes Diabetes in his father and mother. SOCIAL HISTORY      reports that he has been smoking cigarettes. He started smoking about 37 years ago. He has a 7.50 pack-year smoking history. He has never used smokeless tobacco. He reports previous alcohol use. He reports that he does not use drugs. PHYSICAL EXAM     INITIAL VITALS:  height is 6' 2\" (1.88 m) and weight is 195 lb (88.5 kg). His oral temperature is 97.9 °F (36.6 °C). His blood pressure is 138/80 and his pulse is 92. His respiration is 18 and oxygen saturation is 100%. Physical Exam  Vitals signs and nursing note reviewed. Constitutional:       General: He is not in acute distress. Appearance: He is well-developed. He is not diaphoretic. HENT:      Head: Normocephalic and atraumatic. Right Ear: External ear normal.      Left Ear: External ear normal.      Nose: Nose normal.   Eyes:      General: Lids are normal. No scleral icterus. Right eye: No discharge. Left eye: No discharge. Conjunctiva/sclera: Conjunctivae normal.      Right eye: No exudate. Left eye: No exudate. Pupils: Pupils are equal, round, and reactive to light. Neck:      Musculoskeletal: Normal range of motion and neck supple. Normal range of motion. Thyroid: No thyromegaly. Vascular: No JVD. Trachea: No tracheal deviation. Cardiovascular:      Rate and Rhythm: Normal rate and regular rhythm. Pulses: Normal pulses. Heart sounds: Normal heart sounds, S1 normal and S2 normal. No murmur. No friction rub.  No

## 2021-04-05 NOTE — ED NOTES
Pt medicated per [facility] and pt educated. Pt informed RN that it \"does not matter because it wont help. \" RN to reassess pain and DC pt. Pt requesting turkey sandwhich and apple juice. RN provided for pt.       Jaxon Joseph RN  04/05/21 3182

## 2021-04-08 ENCOUNTER — TELEPHONE (OUTPATIENT)
Dept: PHYSICAL MEDICINE AND REHAB | Age: 61
End: 2021-04-08

## 2021-04-14 DIAGNOSIS — G89.4 CHRONIC PAIN SYNDROME: ICD-10-CM

## 2021-04-14 DIAGNOSIS — F11.90 CHRONIC, CONTINUOUS USE OF OPIOIDS: ICD-10-CM

## 2021-04-14 DIAGNOSIS — M79.604 RIGHT LEG PAIN: ICD-10-CM

## 2021-04-14 DIAGNOSIS — M47.816 LUMBAR SPONDYLOSIS: ICD-10-CM

## 2021-04-14 DIAGNOSIS — M48.061 SPINAL STENOSIS OF LUMBAR REGION WITHOUT NEUROGENIC CLAUDICATION: ICD-10-CM

## 2021-04-14 DIAGNOSIS — M54.17 LUMBOSACRAL RADICULITIS: ICD-10-CM

## 2021-04-14 RX ORDER — HYDROCODONE BITARTRATE AND ACETAMINOPHEN 5; 325 MG/1; MG/1
TABLET ORAL
Qty: 120 TABLET | Refills: 0 | Status: SHIPPED | OUTPATIENT
Start: 2021-04-15 | End: 2021-05-02

## 2021-04-14 NOTE — TELEPHONE ENCOUNTER
[facility] reviewed. UDS: + for  Cyclobenzaprine. Last seen: 3/16/2021. Follow-up: 4/28/21      Per [facility] on 3/3/21 for #42 for a 14 day fill and on 3/16/21 filled #120 for 30 day.

## 2021-04-20 ENCOUNTER — ANESTHESIA EVENT (OUTPATIENT)
Dept: OPERATING ROOM | Age: 61
End: 2021-04-20
Payer: OTHER GOVERNMENT

## 2021-04-20 ENCOUNTER — APPOINTMENT (OUTPATIENT)
Dept: GENERAL RADIOLOGY | Age: 61
End: 2021-04-20
Attending: PAIN MEDICINE
Payer: OTHER GOVERNMENT

## 2021-04-20 ENCOUNTER — ANESTHESIA (OUTPATIENT)
Dept: OPERATING ROOM | Age: 61
End: 2021-04-20
Payer: OTHER GOVERNMENT

## 2021-04-20 ENCOUNTER — HOSPITAL ENCOUNTER (OUTPATIENT)
Age: 61
Setting detail: OUTPATIENT SURGERY
Discharge: HOME | End: 2021-04-20
Attending: PAIN MEDICINE | Admitting: PAIN MEDICINE
Payer: OTHER GOVERNMENT

## 2021-04-20 VITALS
WEIGHT: 192 LBS | HEART RATE: 78 BPM | TEMPERATURE: 97.8 F | RESPIRATION RATE: 20 BRPM | BODY MASS INDEX: 24.64 KG/M2 | SYSTOLIC BLOOD PRESSURE: 135 MMHG | OXYGEN SATURATION: 98 % | HEIGHT: 74 IN | DIASTOLIC BLOOD PRESSURE: 88 MMHG

## 2021-04-20 VITALS
DIASTOLIC BLOOD PRESSURE: 69 MMHG | RESPIRATION RATE: 21 BRPM | OXYGEN SATURATION: 99 % | SYSTOLIC BLOOD PRESSURE: 127 MMHG

## 2021-04-20 PROCEDURE — 3209999900 FLUORO FOR SURGICAL PROCEDURES

## 2021-04-20 PROCEDURE — 6360000002 HC RX W HCPCS: Performed by: PAIN MEDICINE

## 2021-04-20 PROCEDURE — 3600000054 HC PAIN LEVEL 3 BASE: Performed by: PAIN MEDICINE

## 2021-04-20 PROCEDURE — 62323 NJX INTERLAMINAR LMBR/SAC: CPT | Performed by: PAIN MEDICINE

## 2021-04-20 PROCEDURE — 6360000004 HC RX CONTRAST MEDICATION: Performed by: PAIN MEDICINE

## 2021-04-20 PROCEDURE — 2580000003 HC RX 258: Performed by: PAIN MEDICINE

## 2021-04-20 PROCEDURE — 7100000010 HC PHASE II RECOVERY - FIRST 15 MIN: Performed by: PAIN MEDICINE

## 2021-04-20 PROCEDURE — 7100000011 HC PHASE II RECOVERY - ADDTL 15 MIN: Performed by: PAIN MEDICINE

## 2021-04-20 PROCEDURE — 3600000055 HC PAIN LEVEL 3 ADDL 15 MIN: Performed by: PAIN MEDICINE

## 2021-04-20 PROCEDURE — 2500000003 HC RX 250 WO HCPCS: Performed by: PAIN MEDICINE

## 2021-04-20 PROCEDURE — 2709999900 HC NON-CHARGEABLE SUPPLY: Performed by: PAIN MEDICINE

## 2021-04-20 RX ORDER — SODIUM CHLORIDE 9 MG/ML
INJECTION INTRAVENOUS PRN
Status: DISCONTINUED | OUTPATIENT
Start: 2021-04-20 | End: 2021-04-20 | Stop reason: ALTCHOICE

## 2021-04-20 RX ORDER — DEXAMETHASONE SODIUM PHOSPHATE 4 MG/ML
INJECTION, SOLUTION INTRA-ARTICULAR; INTRALESIONAL; INTRAMUSCULAR; INTRAVENOUS; SOFT TISSUE PRN
Status: DISCONTINUED | OUTPATIENT
Start: 2021-04-20 | End: 2021-04-20 | Stop reason: ALTCHOICE

## 2021-04-20 RX ORDER — LIDOCAINE HYDROCHLORIDE 10 MG/ML
INJECTION, SOLUTION INFILTRATION; PERINEURAL PRN
Status: DISCONTINUED | OUTPATIENT
Start: 2021-04-20 | End: 2021-04-20 | Stop reason: ALTCHOICE

## 2021-04-20 ASSESSMENT — PAIN DESCRIPTION - DESCRIPTORS: DESCRIPTORS: CONSTANT

## 2021-04-20 ASSESSMENT — PULMONARY FUNCTION TESTS
PIF_VALUE: 1
PIF_VALUE: 0
PIF_VALUE: 0

## 2021-04-20 ASSESSMENT — LIFESTYLE VARIABLES: SMOKING_STATUS: 1

## 2021-04-20 ASSESSMENT — PAIN - FUNCTIONAL ASSESSMENT: PAIN_FUNCTIONAL_ASSESSMENT: 0-10

## 2021-04-20 NOTE — PROGRESS NOTES
1328-Patient to Phase II via cart. Report received from [facility] [employer]. Patient awake and alert-local sedation only. Vitals obtained and stable. Respirations even and unlabored on room air. Patient denies pain, nausea, numbness and tingling. Patient able to move all extremities. No drainage noted at injection sites. Patient instructed to stay in bed. Instructed on call light use. 1333-Patient provided with snack and drink. Call light in reach. Friend called to notify of pickup. 1350-Patient discharged in stable condition with responsible . All belongings given to patient. Patient ambulated to car with assistance from RN. Patient tolerated well.

## 2021-04-20 NOTE — H&P
H&P    2 week FU. Patient here for complaint of pain in right lower back radiating in right lower extremity from right side hip areas and radiates down [place] anterior leg to t about right knee- sharp, pain with tingling and increased with walking. Pain is mainly in right lower extremity 90% bothersome.      Also complaining of pain in right side of neck sharp and aching aching pain.      Norco prn continues to help decrease pain to a tolerable level.         Medications reviewed. Patient denies side effects with medications. Patient states he is taking medications as prescribed. Hedenies receiving pain medications from other sources. He had 1 ER visit on 3/26/2021 for right leg pain  any ER visits since last visit.     Pain scale with out pain medications or at its worst is 10/10. Pain scale with pain medications or at its best is 0/10. Last dose of Norco was yesterday  Drug screen reviewed from 3/16/2021 and appropriate for note    Pill count was not completed today and instructed to bring into this clinic. Patient does not have naloxone available at home. Patient has not required use of naloxone at home since last office visit.         The patienthas No Known Allergies.        Subjective:      Review of Systems   Constitutional: Negative. Musculoskeletal: Positive for arthralgias, back pain, myalgias, neck pain and neck stiffness. Ambulating with strait cane    Neurological: Positive for weakness and numbness. Psychiatric/Behavioral: Negative.          Objective:      Vitals       Vitals:     03/31/21 0948   BP: 130/80   Temp: 97.4 °F (36.3 °C)   Weight: 198 lb (89.8 kg)   Height: 6' 2\" (1.88 m)            Physical Exam  Vitals signs and nursing note reviewed. Constitutional:       General: He is not in acute distress. Appearance: He is well-developed. He is not diaphoretic. HENT:      Head: Normocephalic and atraumatic.       Right Ear: External ear normal.      Left Ear: External ear back: He exhibits no tenderness. Lumbar back: He exhibits decreased range of motion, tenderness, bony tenderness, pain and spasm. Back:       Right upper arm: He exhibits tenderness. Right forearm: He exhibits tenderness. Right hand: He exhibits tenderness. Decreased sensation noted. Right upper leg: He exhibits tenderness. Right lower leg: He exhibits no swelling. No edema. Left lower leg: He exhibits no swelling. No edema. Skin:     General: Skin is warm. Coloration: Skin is not pale. Findings: No erythema or rash. Neurological:      General: No focal deficit present. Mental Status: He is alert and oriented to person, place, and time. He is not disoriented. Cranial Nerves: No cranial nerve deficit. Sensory: Sensory deficit present. Motor: Weakness present. No atrophy or abnormal muscle tone. Coordination: Romberg sign positive. Coordination normal.      Gait: Gait abnormal.      Deep Tendon Reflexes: Babinski sign absent on the right side. Babinski sign absent on the left side. Reflex Scores:       Tricep reflexes are 2+ on the right side and 2+ on the left side. Bicep reflexes are 2+ on the right side and 2+ on the left side. Brachioradialis reflexes are 2+ on the right side and 2+ on the left side. Patellar reflexes are 2+ on the right side and 2+ on the left side. Achilles reflexes are 1+ on the right side and 2+ on the left side. Comments: SLR -POS RLE at 60 degrees    Psychiatric:         Attention and Perception: Attention normal. He is attentive. Mood and Affect: Mood normal. Mood is not anxious or depressed. Affect is not labile, blunt, angry or inappropriate. Speech: Speech normal. He is communicative.  Speech is not rapid and pressured, delayed, slurred or tangential.         Behavior: Behavior normal. Behavior is not agitated, slowed, aggressive, withdrawn, hyperactive or combative. Behavior is cooperative. Thought Content: Thought content normal. Thought content is not paranoid or delusional. Thought content does not include homicidal or suicidal ideation. Thought content does not include homicidal or suicidal plan. Cognition and Memory: Cognition normal. Memory is not impaired. He does not exhibit impaired recent memory or impaired remote memory. Judgment: Judgment is not impulsive or inappropriate.         IGNACIA test: + right side   Yeomans test: + right side   Gaenslen test: + right side  Assessment:      1. Lumbosacral radiculitis    2. Spinal stenosis of lumbar region without neurogenic claudication    3. Right leg pain    4. Lumbar spondylosis    5. Neuropathy    6. Chronic pain syndrome    7. Primary osteoarthritis of right hip    8. Neck pain       Plan:      · OARRS reviewed. Current MED: 20.00  · Patient was offered naloxone for home. · Discussed long term side effects of medications, tolerance, dependency and addiction. · Previous UDS reviewed  · UDS preformed today for compliance. · Patient told can not receive any pain medications from any other source. · No evidence of abuse, diversion or aberrant behavior. · Medications and/or procedures to improve function and quality of life- patient understanding with this and that may not be pain free  · Discussed with patient about safe storage of medications at home  · Discussed possible weaning of medication dosing dependent on treatment/procedure results. · Discussed with patient about risks with procedure including infection, reaction to medication, increased pain, or bleeding. · Procedure notes reveiwed in detail   · Only 1 day of relief from previous right TFLESI   · Reviewed lumbar MRI  · Plan right LESI @ L4. Procedure notes reviewed in detail. · Continue Norco 5 prn q 6 hrs prn- filled 3/16/2021  · Continue Neurontin- 300 mg BID- filled 3/16/2021.    · Instructed patient that he needs to

## 2021-04-20 NOTE — OP NOTE
Operative Note    Pre-Procedure Note    Patient Name: Princess Escobedo   YOB: 1960  Medical Record Number: [id number]  Date: 4/20/21       Indication:  L-spinal stenosis  Consent: On file. Vital Signs:   Vitals:    04/20/21 1215   BP: 124/75   Pulse: 93   Resp: 16   Temp: 97.3 °F (36.3 °C)   SpO2: 97%       Past Medical History:   has a past medical history of Bipolar 1 disorder ([facility] COPD (chronic obstructive pulmonary disease) ([facility] Hypertension, and Lumbar radiculopathy. Past Surgical History:   has a past surgical history that includes Foot surgery (Right); Tonsillectomy; Colonoscopy (Left, 6/21/2019); Upper gastrointestinal endoscopy (Left, 6/21/2019); Pain management procedure (Right, 2/8/2021); and hip surgery (Right, 3/15/2021). Pre-Sedation Documentation and Exam:   Vital signs have been reviewed (see flow sheet for vitals). Sedation/Anesthesia Plan:   MAC    Medications Planned:   Per Anesthesia. Patient is an appropriate candidate for plan of sedation: yes    Preoperative Diagnosis:  L-radiculitis, L-spinal  Stenosis      Post-Op Dx: as above    Procedure Performed:  Lumbar epidural steroid injection under fluoroscopy guidance # 1. .    Indication for the Procedure: The patient failed conservative management  for pain in the low back radiating to lower extremities. The patient is undergoing lumbar epidural steroid injection. As the patient is not responding to conservative management and it is interfering with activities of daily living we decided to proceed with lumbar epidural steroid injection. The procedure and risks were discussed with the patient and an informed consent was obtained    Procedure: Attempted L4 right to narrow    The patient is placed in prone position. Skin over the back was prepped and draped in sterile manner.  Then using fluoroscopy the L5 interspace was observed and the skin and deep tissues in the left paramedian area were infiltrated with 12 ml of 1% lidocaine. The #20-gauge, 3-1/2 inch Tuohy needle was inserted through the skin wheal and the epidural space was identified using loss of resistance technique . This was confirmed with AP and lateral views using fluoroscopy after injecting about 2 ml of Omnipaque-180 and observing the spread of the contrast in the epidural space. Then after negative aspiration a total of 12 mg of dexamethasone  with 4 ml of normal saline was injected into the epidural space. The needle is removed and a Band-Aid was placed over the needle insertion site. No paresthesia. EBL-0  Patient's vital signs remained stable and the patient tolerated the procedure well. The patient will be discharged home in stable condition and will be followed in the office in the next few weeks for further planning.     Electronically signed by Latisha Servin MD on 4/20/21 at 1:11 PM EDT

## 2021-04-20 NOTE — H&P
[facility]  History and Physical Update    Pt Name: Kori Mustafa  MRN: [id number]  YOB: 1960  Date of evaluation: 4/20/2021      I have examined the patient and reviewed the H&P/Consult and there are no changes to the patient or plans.         Electronically signed by David Driver MD on 4/20/2021 at 12:52 PM

## 2021-04-20 NOTE — ANESTHESIA PRE PROCEDURE
Department of Anesthesiology  Preprocedure Note       Name:  Lolita Painter   Age:  61 y.o.  :  1960                                          MRN:  [id number]         Date:  2021      Surgeon: Maurizio Blanchard):  Davie Nesbitt MD    Procedure: Procedure(s):  LESI @L4#1 right    Medications prior to admission:   Prior to Admission medications    Medication Sig Start Date End Date Taking? Authorizing Provider   HYDROcodone-acetaminophen (NORCO) 5-325 MG per tablet take 1 tablet by mouth every 6 hours if needed for pain 4/15/21 5/15/21  FRANK Givens CNP   gabapentin (NEURONTIN) 300 MG capsule Take 1 capsule by mouth 2 times daily for 30 days. 3/16/21 4/15/21  FRANK Givens CNP   ketorolac (TORADOL) 10 MG tablet Take 1 tablet by mouth every 6 hours as needed for Pain 9/11/20 3/16/21  FRANK Hilton CNP   ibuprofen (IBU) 400 MG tablet Take 1 tablet by mouth every 6 hours as needed for Pain 4/15/20 3/16/21  Myra Wood DO   lactase (LACTAID) 3000 units tablet Take 1 tablet by mouth 3 times daily (with meals) 3/30/20   Afshan Forbes PA-C   Calcium Polycarbophil (FIBER) 625 MG TABS Take 1 tablet by mouth daily 3/30/20   Afshan Forbes PA-C   acetaminophen (APAP EXTRA STRENGTH) 500 MG tablet Take 1 tablet by mouth every 6 hours as needed for Pain 18   Benito Chakraborty PA-C   albuterol sulfate  (90 BASE) MCG/ACT inhaler 2 puffs every 4-6 hours with spacer for wheezing and chest congestionwhen necessary 16   Georgina Denson MD   FLUoxetine (PROZAC) 20 MG capsule Take 20 mg by mouth daily. Historical Provider, MD   metoprolol (TOPROL-XL) 50 MG XL tablet Take 50 mg by mouth daily. Historical Provider, MD       Current medications:    No current facility-administered medications for this encounter.         Allergies:  No Known Allergies    Problem List:    Patient Active Problem List   Diagnosis Code    Closed right calcaneal fracture S92.001A    Lumbar radiculitis M54.16    Spinal stenosis of lumbar region without neurogenic claudication M48.061    Primary osteoarthritis of right hip M16.11       Past Medical History:        Diagnosis Date    [facility]     [facility]     Hypertension     Lumbar radiculopathy        Past Surgical History:        Procedure Laterality Date    COLONOSCOPY Left 6/21/2019    COLONOS[facility]AGNOSTIC [facility] at [facility] Right     HIP SURGERY Right 3/15/2021    right hip steroid injection no sedation performed by Davie Nesbitt MD at [place] [address] Right 2/8/2021    RIGHT TFLESI @ L4 and L5. performed by [facility]ie Nesbitt [facility] at [facility] [address] [place] [address] ENDOSCOPY Left 6/21/2019    EGD BIOPSY performed by Neri Lynch MD at [facility]       Social History:    Social History     Tobacco Use    Smoking status: Current Every Day Smoker     Packs/day: 0.25     Years: 30.00     Pack years: 7.50     Types: Cigarettes     Start date: 4/27/1983    Smokeless tobacco: Never Used   Substance Use Topics    Alcohol use: Not Currently     Comment: occasionally                                Ready to quit: Not Answered  Counseling given: Not Answered      Vital Signs (Current):   Vitals:    04/20/21 1215   BP: 124/75   Pulse: 93   Resp: 16   Temp: 97.3 °F (36.3 °C)   TempSrc: Temporal   SpO2: 97%   Weight: 192 lb (87.1 kg)   Height: 6' 2\" (1.88 m)                                              BP Readings from Last 3 Encounters:   04/20/21 124/75   04/05/21 129/89   03/31/21 130/80       NPO Status: Time of last liquid consumption: 2300                        Time of last solid consumption: 2300                        Date of last liquid consumption: 04/19/21                        Date of last solid food consumption: 04/19/21    BMI:   Wt Endo/Other: Negative Endo/Other ROS             Pt had no PAT visit       Abdominal:           Vascular: negative vascular ROS. Anesthesia Plan      MAC     ASA 3       Induction: intravenous. Anesthetic plan and risks discussed with patient. Plan discussed with CRNA.                   Carson Myers DO   4/20/2021

## 2021-05-02 ENCOUNTER — HOSPITAL ENCOUNTER (EMERGENCY)
Age: 61
Discharge: HOME | End: 2021-05-02
Attending: EMERGENCY MEDICINE
Payer: MEDICARE

## 2021-05-02 VITALS
HEART RATE: 72 BPM | DIASTOLIC BLOOD PRESSURE: 95 MMHG | HEIGHT: 74 IN | OXYGEN SATURATION: 98 % | WEIGHT: 192 LBS | SYSTOLIC BLOOD PRESSURE: 159 MMHG | RESPIRATION RATE: 18 BRPM | BODY MASS INDEX: 24.64 KG/M2 | TEMPERATURE: 98.9 F

## 2021-05-02 DIAGNOSIS — M54.41 CHRONIC RIGHT-SIDED LOW BACK PAIN WITH RIGHT-SIDED SCIATICA: Primary | ICD-10-CM

## 2021-05-02 DIAGNOSIS — G89.29 CHRONIC RIGHT-SIDED LOW BACK PAIN WITH RIGHT-SIDED SCIATICA: Primary | ICD-10-CM

## 2021-05-02 PROCEDURE — 99283 EMERGENCY DEPT VISIT LOW MDM: CPT

## 2021-05-02 PROCEDURE — 6360000002 HC RX W HCPCS: Performed by: STUDENT IN AN ORGANIZED HEALTH CARE EDUCATION/TRAINING PROGRAM

## 2021-05-02 PROCEDURE — 96372 THER/PROPH/DIAG INJ SC/IM: CPT

## 2021-05-02 RX ORDER — METHYLPREDNISOLONE SODIUM SUCCINATE 125 MG/2ML
60 INJECTION, POWDER, LYOPHILIZED, FOR SOLUTION INTRAMUSCULAR; INTRAVENOUS ONCE
Status: COMPLETED | OUTPATIENT
Start: 2021-05-02 | End: 2021-05-02

## 2021-05-02 RX ORDER — KETOROLAC TROMETHAMINE 30 MG/ML
30 INJECTION, SOLUTION INTRAMUSCULAR; INTRAVENOUS ONCE
Status: COMPLETED | OUTPATIENT
Start: 2021-05-02 | End: 2021-05-02

## 2021-05-02 RX ORDER — METHYLPREDNISOLONE 4 MG/1
TABLET ORAL
Qty: 1 KIT | Refills: 0 | Status: SHIPPED | OUTPATIENT
Start: 2021-05-02 | End: 2021-05-08

## 2021-05-02 RX ADMIN — METHYLPREDNISOLONE SODIUM SUCCINATE 60 MG: 125 INJECTION, POWDER, FOR SOLUTION INTRAMUSCULAR; INTRAVENOUS at 05:10

## 2021-05-02 RX ADMIN — KETOROLAC TROMETHAMINE 30 MG: 30 INJECTION, SOLUTION INTRAMUSCULAR; INTRAVENOUS at 05:10

## 2021-05-02 ASSESSMENT — ENCOUNTER SYMPTOMS
BACK PAIN: 1
SHORTNESS OF BREATH: 0
SORE THROAT: 0
EYE PAIN: 0
SINUS PRESSURE: 0
COLOR CHANGE: 0
ABDOMINAL PAIN: 0
CONSTIPATION: 0
COUGH: 0
DIARRHEA: 0
VOMITING: 0

## 2021-05-02 NOTE — ED NOTES
ED nurse-to-nurse bedside report    Chief Complaint   Patient presents with    Leg Pain      LOC: alert and orientated to name, place, date  Vital signs   Vitals:    05/02/21 0328 05/02/21 0430   BP: (!) 145/90 (!) 159/95   Pulse: 77 72   Resp: 20 18   Temp: 98.9 °F (37.2 °C)    TempSrc: Oral    SpO2: 99% 98%   Weight: 192 lb (87.1 kg)    Height: 6' 2\" (1.88 m)       Pain:    Pain Interventions: pain shot (not yet ordered)  Pain Goal: 4/10  Oxygen: No    Current needs required pain shot   Telemetry: No  LDAs:    Continuous Infusions:   Mobility: Independent  Hwang Fall Risk Score: No flowsheet data found. Fall Interventions: pt not a fall risk. Pt ambulates independently without complications.   Report given to: Khari Grider RN  05/02/21 3076

## 2021-05-02 NOTE — ED PROVIDER NOTES
for weakness, light-headedness, numbness and headaches. PAST MEDICAL AND SURGICAL HISTORY     Past Medical History:   Diagnosis Date    B[facility] ([facility]     COPD (chronic obstructive pulmonary disease) ([facility]     Hypertension     Lumbar radiculopathy      Past Surgical History:   Procedure Laterality Date    COLONOSCOPY Left 6/21/2019    COLONOSCOPY DIAGNOSTIC performed by Ranulfo Bacon MD at [place] Right     HIP SURGERY Right 3/15/2021    right hip steroid injection no sedation performed by Beverley Abbasi MD at [place] [address] Right 2/8/2021    RIGHT TFLESI @ L4 and L5. performed by Beverley Abbasi MD at [place] [address] Right 4/20/2021    LESI @L4#1 right performed by Beverley Abbasi MD at [address] [place] [address] ENDOSCOPY Left 6/21/2019    EGD BIOPSY performed by Ranulfo Bacon MD at [address] [place] [address]   No current facility-administered medications for this encounter. Current Outpatient Medications:     methylPREDNISolone (MEDROL, MARILY,) 4 MG tablet, Take by mouth., Disp: 1 kit, Rfl: 0    gabapentin (NEURONTIN) 300 MG capsule, Take 1 capsule by mouth 2 times daily for 30 days. , Disp: 60 capsule, Rfl: 1    ketorolac (TORADOL) 10 MG tablet, Take 1 tablet by mouth every 6 hours as needed for Pain, Disp: 10 tablet, Rfl: 0    ibuprofen (IBU) 400 MG tablet, Take 1 tablet by mouth every 6 hours as needed for Pain, Disp: 40 tablet, Rfl: 0    lactase (LACTAID) 3000 units tablet, Take 1 tablet by mouth 3 times daily (with meals), Disp: 30 tablet, Rfl: 0    Calcium Polycarbophil (FIBER) 625 MG TABS, Take 1 tablet by mouth daily, Disp: 14 tablet, Rfl: 0    acetaminophen (APAP EXTRA STRENGTH) 500 MG tablet, Take 1 tablet by mouth every 6 hours as needed for Pain, Disp: 120 tablet, Rfl: 0    albuterol sulfate  (90 BASE) MCG/ACT inhaler, 2 puffs every 4-6 hours with spacer for wheezing and chest congestionwhen necessary, Disp: 1 Inhaler, Rfl: 0    FLUoxetine (PROZAC) 20 MG capsule, Take 20 mg by mouth daily. , Disp: , Rfl:     metoprolol (TOPROL-XL) 50 MG XL tablet, Take 50 mg by mouth daily. , Disp: , Rfl:       SOCIAL HISTORY     Social History     Social History Narrative    Not on file     Social History     Tobacco Use    Smoking status: Current Every Day Smoker     Packs/day: 0.25     Years: 30.00     Pack years: 7.50     Types: Cigarettes     Start date: 4/27/1983    Smokeless tobacco: Never Used   Substance Use Topics    Alcohol use: Not Currently     Comment: occasionally    Drug use: No     Comment: hx of         ALLERGIES   No Known Allergies      FAMILY HISTORY     Family History   Problem Relation Age of Onset    Diabetes Mother     Diabetes Father          PREVIOUS RECORDS   Previous records reviewed: Patient last seen approximately 1 month ago for acute exacerbation of low back pain. Shoshana Zhang PHYSICAL EXAM     ED Triage Vitals [05/02/21 0328]   BP Temp Temp Source Pulse Resp SpO2 Height Weight   (!) 145/90 98.9 °F (37.2 °C) Oral 77 20 99 % 6' 2\" (1.88 m) 192 lb (87.1 kg)     Initial vital signs and nursing assessment reviewed and normal. Body mass index is 24.65 kg/m². Pulsoximetry is normal per my interpretation. Additional Vital Signs:  Vitals:    05/02/21 0430   BP: (!) 159/95   Pulse: 72   Resp: 18   Temp:    SpO2: 98%       Physical Exam  Constitutional:       General: He is not in acute distress. Appearance: Normal appearance. He is not ill-appearing or diaphoretic. HENT:      Head: Normocephalic and atraumatic. Nose: Nose normal. No congestion or rhinorrhea. Mouth/Throat:      Mouth: Mucous membranes are moist.      Pharynx: Oropharynx is clear. No oropharyngeal exudate or posterior oropharyngeal erythema. Eyes:      Extraocular Movements: Extraocular movements intact. display    Radiologic studies results:  No orders to display       ED Medications administered this visit:   Medications   ketorolac (TORADOL) injection 30 mg (30 mg Intramuscular Given 5/2/21 0510)   methylPREDNISolone sodium (SOLU-MEDROL) injection 60 mg (60 mg Intramuscular Given 5/2/21 0510)         ED COURSE          Strict return precautions and follow up instructions were discussed with the patient prior to discharge, with which the patient agrees. FINAL DISPOSITION     Final diagnoses:   Chronic right-sided low back pain with right-sided sciatica     Condition: condition: stable  Dispo: Discharge to home      This transcription was electronically signed. Parts of this transcriptions may have been dictated by use of voice recognition software and electronically transcribed, and parts may have been transcribed with the assistance of an ED scribe. The transcription may contain errors not detected in proofreading. Please refer to my supervising physician's documentation if my documentation differs.     Electronically Signed: Simin Chiu, 05/02/21, 6:31 AM       Simin Chiu DO  Resident  05/02/21 7994

## 2021-05-02 NOTE — ED PROVIDER NOTES

## 2021-05-02 NOTE — ED NOTES
Pt requesting turkey sandwich at this time and walking around the hallway without complications.       Jennifer Mccracken RN  05/02/21 7916

## 2021-05-14 DIAGNOSIS — M54.17 LUMBOSACRAL RADICULITIS: ICD-10-CM

## 2021-05-14 DIAGNOSIS — M79.604 RIGHT LEG PAIN: ICD-10-CM

## 2021-05-14 DIAGNOSIS — M47.816 LUMBAR SPONDYLOSIS: ICD-10-CM

## 2021-05-14 DIAGNOSIS — M48.061 SPINAL STENOSIS OF LUMBAR REGION WITHOUT NEUROGENIC CLAUDICATION: ICD-10-CM

## 2021-05-14 DIAGNOSIS — G89.4 CHRONIC PAIN SYNDROME: ICD-10-CM

## 2021-05-14 DIAGNOSIS — F11.90 CHRONIC, CONTINUOUS USE OF OPIOIDS: ICD-10-CM

## 2021-05-17 RX ORDER — HYDROCODONE BITARTRATE AND ACETAMINOPHEN 5; 325 MG/1; MG/1
TABLET ORAL
Qty: 120 TABLET | Refills: 0 | Status: SHIPPED | OUTPATIENT
Start: 2021-05-17 | End: 2021-06-14

## 2021-05-17 RX ORDER — GABAPENTIN 300 MG/1
CAPSULE ORAL
Qty: 60 CAPSULE | Refills: 1 | Status: SHIPPED | OUTPATIENT
Start: 2021-05-17 | End: 2021-07-08

## 2021-05-19 ENCOUNTER — TELEPHONE (OUTPATIENT)
Dept: PHYSICAL MEDICINE AND REHAB | Age: 61
End: 2021-05-19

## 2021-05-19 NOTE — TELEPHONE ENCOUNTER
Leena Camacho is calling he just wants Gabby Eden that he misplaced his gabapentin and norco, he is aware that he is not able to get anymore, just wanted Jacob to be aware prior to his 06-02 appt.

## 2021-06-02 ENCOUNTER — TELEPHONE (OUTPATIENT)
Dept: PHYSICAL MEDICINE AND REHAB | Age: 61
End: 2021-06-02

## 2021-06-02 ENCOUNTER — OFFICE VISIT (OUTPATIENT)
Dept: PHYSICAL MEDICINE AND REHAB | Age: 61
End: 2021-06-02
Payer: OTHER GOVERNMENT

## 2021-06-02 VITALS
WEIGHT: 192 LBS | HEIGHT: 74 IN | BODY MASS INDEX: 24.64 KG/M2 | SYSTOLIC BLOOD PRESSURE: 138 MMHG | DIASTOLIC BLOOD PRESSURE: 88 MMHG

## 2021-06-02 DIAGNOSIS — G62.9 NEUROPATHY: ICD-10-CM

## 2021-06-02 DIAGNOSIS — M48.061 SPINAL STENOSIS OF LUMBAR REGION WITHOUT NEUROGENIC CLAUDICATION: ICD-10-CM

## 2021-06-02 DIAGNOSIS — M54.17 LUMBOSACRAL RADICULITIS: Primary | ICD-10-CM

## 2021-06-02 DIAGNOSIS — F11.90 CHRONIC, CONTINUOUS USE OF OPIOIDS: ICD-10-CM

## 2021-06-02 DIAGNOSIS — M79.604 RIGHT LEG PAIN: ICD-10-CM

## 2021-06-02 DIAGNOSIS — M47.816 LUMBAR SPONDYLOSIS: ICD-10-CM

## 2021-06-02 DIAGNOSIS — M16.11 PRIMARY OSTEOARTHRITIS OF RIGHT HIP: ICD-10-CM

## 2021-06-02 DIAGNOSIS — G89.4 CHRONIC PAIN SYNDROME: ICD-10-CM

## 2021-06-02 PROCEDURE — 99214 OFFICE O/P EST MOD 30 MIN: CPT | Performed by: NURSE PRACTITIONER

## 2021-06-02 RX ORDER — TIZANIDINE 4 MG/1
4 TABLET ORAL EVERY 8 HOURS PRN
Qty: 90 TABLET | Refills: 0 | Status: SHIPPED | OUTPATIENT
Start: 2021-06-02 | End: 2021-07-02

## 2021-06-02 ASSESSMENT — ENCOUNTER SYMPTOMS: BACK PAIN: 1

## 2021-06-02 NOTE — PROGRESS NOTES
[address] [facility] [address]  Dept: [phone]  Dept Fax: [phone]: [phone]    Visit Date: 6/2/2021    Functionality Assessment/Goals Worksheet     On a scale of 0 (Does not Interfere) to 10 (Completely Interferes)     1. Which number describes how during the past week pain has interfered with       the following:  A. General Activity:  10  B. Mood: 10  C. Walking Ability:  10  D. Normal Work (Includes both work outside the home and housework):  6  E. Relations with Other People:   10  F. Sleep:   10  G. Enjoyment of Life:   10    2. Patient Prefers to Take their Pain Medications:     []  On a regular basis   []  Only when necessary    []  Does not take pain medications    3. What are the Patient's Goals/Expectations for Visiting Pain Management? []  Learn about my pain    [x]  Receive Medication   []  Physical Therapy     []  Treat Depression   [x]  Receive Injections    []  Treat Sleep   []  Deal with Anxiety and Stress   []  Treat Opoid Dependence/Addiction   []  Other:      HPI:   Jenny Persaud is a 61 y.o. male is here today for    Chief Complaint: Right leg pain  HPI   FU from LESI # 1 from 4/20/2021. Received about 80% relief of right leg pain for 1 full week and then pain came right back. Was able to tolerate full activity for that week. Denies any pain in lower back pain is down right leg from right side hip radiates down lateral and anterior leg to about right knee- sharp, pain with tingling and increased with walking. Denies any neck pain today   States that he lost his pain medication while shopping and misplaced them and does not know what happened to them and states that he is suffering d/t that. Discussed we will not fill early and if it happens again he will be discharged. Medications reviewed. Patient denies side effects with medications. Patient states he is taking medications as prescribed. Hedenies receiving pain medications from other sources. He had 1 ER visit since last visit. Pain scale with out pain medications or at its worst is 10/10. Pain scale with pain medications or at its best is 0/10. Last dose of Norco was over a week ago   Drug screen reviewed from 3/16/2021 and was appropriate per note   Pill count was not completed today patient misplaced medications   Patient does not have naloxone available at home. Patient has not required use of naloxone at home since last office visit. The patienthas No Known Allergies. Subjective:      Review of Systems   Constitutional: Negative. Musculoskeletal: Positive for arthralgias, back pain, gait problem, myalgias, neck pain and neck stiffness. Ambulating with strait cane    Neurological: Positive for weakness and numbness. Psychiatric/Behavioral: Negative. Objective:     Vitals:    06/02/21 0723   BP: 138/88   Weight: 192 lb (87.1 kg)   Height: 6' 2\" (1.88 m)       Physical Exam  Vitals and nursing note reviewed. Constitutional:       General: He is not in acute distress. Appearance: He is well-developed. He is not diaphoretic. HENT:      Head: Normocephalic and atraumatic. Right Ear: External ear normal.      Left Ear: External ear normal.      Nose: Nose normal.      Mouth/Throat:      Pharynx: No oropharyngeal exudate. Eyes:      General: No scleral icterus. Right eye: No discharge. Left eye: No discharge. Conjunctiva/sclera: Conjunctivae normal.      Pupils: Pupils are equal, round, and reactive to light. Neck:      Thyroid: No thyromegaly. Cardiovascular:      Rate and Rhythm: Normal rate and regular rhythm. Heart sounds: Normal heart sounds. No murmur heard. No friction rub. No gallop. Pulmonary:      Effort: Pulmonary effort is normal. No respiratory distress.       Breath sounds: Examination of the Weakness present. No atrophy or abnormal muscle tone. Coordination: Romberg sign positive. Coordination normal.      Gait: Gait abnormal.      Deep Tendon Reflexes: Babinski sign absent on the right side. Babinski sign absent on the left side. Reflex Scores:       Tricep reflexes are 2+ on the right side and 2+ on the left side. Bicep reflexes are 2+ on the right side and 2+ on the left side. Brachioradialis reflexes are 2+ on the right side and 2+ on the left side. Patellar reflexes are 2+ on the right side and 2+ on the left side. Achilles reflexes are 1+ on the right side and 2+ on the left side. Comments: SLR -POS RLE at 60 degrees     Right lower extremity decraesed sensation in thigh    Psychiatric:         Attention and Perception: Attention normal. He is attentive. Mood and Affect: Mood normal. Mood is not anxious or depressed. Affect is not labile, blunt, angry or inappropriate. Speech: Speech normal. He is communicative. Speech is not rapid and pressured, delayed, slurred or tangential.         Behavior: Behavior normal. Behavior is not agitated, slowed, aggressive, withdrawn, hyperactive or combative. Behavior is cooperative. Thought Content: Thought content normal. Thought content is not paranoid or delusional. Thought content does not include homicidal or suicidal ideation. Thought content does not include homicidal or suicidal plan. Cognition and Memory: Cognition normal. Memory is not impaired. He does not exhibit impaired recent memory or impaired remote memory. Judgment: Judgment is not impulsive or inappropriate. IGNACIA test: + right   Yeomans test: + right   Gaenslen test: + right      Assessment:     1. Lumbosacral radiculitis    2. Spinal stenosis of lumbar region without neurogenic claudication    3. Lumbar spondylosis    4. Right leg pain    5. Chronic pain syndrome    6. Neuropathy    7.  Primary osteoarthritis of right hip            Plan:      · OARRS reviewed. Current MED: 20.00  · Patient was offered naloxone for home. · Discussed long term side effects of medications, tolerance, dependency and addiction. · Previous UDS reviewed  · UDS preformed today for compliance. · Patient told can not receive any pain medications from any other source. · No evidence of abuse, diversion or aberrant behavior.  Medications and/or procedures to improve function and quality of life- patient understanding with this and that may not be pain free   Discussed with patient about safe storage of medications at home   Discussed possible weaning of medication dosing dependent on treatment/procedure results.  Discussed with patient about risks with procedure including infection, reaction to medication, increased pain, or bleeding.  Procedure notes reveiwed in detail  · Only 1 day of relief from previous right TFLESI   · Received over 80% relief from right LESI # 1 for 1 full week with full ROM. · Plan to repeat right LESI but at L3. Procedure and risks discussed in detail with patient. · Discussed SCS if LESI does not work or last. Kristennino Bring information   · Continue Norco 5 prn q 6 hrs prn- filled 3/17/2021 patient misplaced them will not ill early and he has last chance if this happens again will not prescribe. · Continue Neurontin- 300 mg BID- has plenty  · Reviewed neck xray- neck pain resolved. · Will order next UDS at [facility] because it will be negative today       Meds. Prescribed:   Orders Placed This Encounter   Medications    tiZANidine (ZANAFLEX) 4 MG tablet     Sig: Take 1 tablet by mouth every 8 hours as needed (spasms)     Dispense:  90 tablet     Refill:  0       Return for right LESI # 2 @ L3. , follow up after procedure.                Electronically signed by FRANK Riley CNP on6/2/2021 at 7:44 AM

## 2021-06-14 DIAGNOSIS — M48.061 SPINAL STENOSIS OF LUMBAR REGION WITHOUT NEUROGENIC CLAUDICATION: ICD-10-CM

## 2021-06-14 DIAGNOSIS — M54.17 LUMBOSACRAL RADICULITIS: ICD-10-CM

## 2021-06-14 DIAGNOSIS — F11.90 CHRONIC, CONTINUOUS USE OF OPIOIDS: ICD-10-CM

## 2021-06-14 DIAGNOSIS — G89.4 CHRONIC PAIN SYNDROME: ICD-10-CM

## 2021-06-14 DIAGNOSIS — M79.604 RIGHT LEG PAIN: ICD-10-CM

## 2021-06-14 DIAGNOSIS — M47.816 LUMBAR SPONDYLOSIS: ICD-10-CM

## 2021-06-14 RX ORDER — HYDROCODONE BITARTRATE AND ACETAMINOPHEN 5; 325 MG/1; MG/1
TABLET ORAL
Qty: 120 TABLET | Refills: 0 | Status: SHIPPED | OUTPATIENT
Start: 2021-06-16 | End: 2021-07-08

## 2021-06-14 NOTE — TELEPHONE ENCOUNTER
OARRS reviewed. UDS: + for Cyclobenzaprine. Last seen: 3/16/2021.  Follow-up:   Future Appointments   Date Time Provider Francesco Flores   6/28/2021 10:30 AM Jo Castro MD [address] [place] [address]   7/21/2021 12:00 PM FRANK Pineda - CNP [facility]

## 2021-06-21 ENCOUNTER — HOSPITAL ENCOUNTER (EMERGENCY)
Age: 61
Discharge: HOME | End: 2021-06-21
Attending: EMERGENCY MEDICINE
Payer: MEDICARE

## 2021-06-21 VITALS
SYSTOLIC BLOOD PRESSURE: 135 MMHG | BODY MASS INDEX: 22.53 KG/M2 | RESPIRATION RATE: 18 BRPM | OXYGEN SATURATION: 96 % | TEMPERATURE: 97.7 F | DIASTOLIC BLOOD PRESSURE: 81 MMHG | HEART RATE: 96 BPM | WEIGHT: 170 LBS | HEIGHT: 73 IN

## 2021-06-21 DIAGNOSIS — L23.7 POISON IVY DERMATITIS: Primary | ICD-10-CM

## 2021-06-21 PROCEDURE — 96372 THER/PROPH/DIAG INJ SC/IM: CPT

## 2021-06-21 PROCEDURE — 99282 EMERGENCY DEPT VISIT SF MDM: CPT

## 2021-06-21 PROCEDURE — 6360000002 HC RX W HCPCS: Performed by: EMERGENCY MEDICINE

## 2021-06-21 RX ORDER — PREDNISONE 20 MG/1
20 TABLET ORAL DAILY
Qty: 7 TABLET | Refills: 0 | Status: SHIPPED | OUTPATIENT
Start: 2021-06-21 | End: 2021-06-28

## 2021-06-21 RX ORDER — METHYLPREDNISOLONE SODIUM SUCCINATE 125 MG/2ML
60 INJECTION, POWDER, LYOPHILIZED, FOR SOLUTION INTRAMUSCULAR; INTRAVENOUS ONCE
Status: COMPLETED | OUTPATIENT
Start: 2021-06-21 | End: 2021-06-21

## 2021-06-21 RX ADMIN — METHYLPREDNISOLONE SODIUM SUCCINATE 60 MG: 125 INJECTION, POWDER, FOR SOLUTION INTRAMUSCULAR; INTRAVENOUS at 23:01

## 2021-06-21 ASSESSMENT — ENCOUNTER SYMPTOMS
EYE PAIN: 0
SORE THROAT: 0
EYE DISCHARGE: 0
CHEST TIGHTNESS: 0
VOICE CHANGE: 0
ABDOMINAL PAIN: 0
BACK PAIN: 0
COUGH: 0
PHOTOPHOBIA: 0
RHINORRHEA: 0
DIARRHEA: 0
CONSTIPATION: 0
SHORTNESS OF BREATH: 0
EYE REDNESS: 0
WHEEZING: 0
TROUBLE SWALLOWING: 0
ABDOMINAL DISTENTION: 0
CHOKING: 0
EYE ITCHING: 0
BLOOD IN STOOL: 0
SINUS PRESSURE: 0
NAUSEA: 0
VOMITING: 0

## 2021-06-22 ENCOUNTER — HOSPITAL ENCOUNTER (OUTPATIENT)
Age: 61
Setting detail: OUTPATIENT SURGERY
Discharge: HOME | End: 2021-06-22
Attending: PAIN MEDICINE | Admitting: PAIN MEDICINE
Payer: OTHER GOVERNMENT

## 2021-06-22 ENCOUNTER — APPOINTMENT (OUTPATIENT)
Dept: GENERAL RADIOLOGY | Age: 61
End: 2021-06-22
Attending: PAIN MEDICINE
Payer: OTHER GOVERNMENT

## 2021-06-22 VITALS
DIASTOLIC BLOOD PRESSURE: 85 MMHG | TEMPERATURE: 97.9 F | WEIGHT: 192 LBS | RESPIRATION RATE: 16 BRPM | SYSTOLIC BLOOD PRESSURE: 134 MMHG | HEIGHT: 73 IN | BODY MASS INDEX: 25.45 KG/M2 | HEART RATE: 84 BPM | OXYGEN SATURATION: 97 %

## 2021-06-22 PROCEDURE — 7100000011 HC PHASE II RECOVERY - ADDTL 15 MIN: Performed by: PAIN MEDICINE

## 2021-06-22 PROCEDURE — 2580000003 HC RX 258: Performed by: PAIN MEDICINE

## 2021-06-22 PROCEDURE — 6360000004 HC RX CONTRAST MEDICATION: Performed by: PAIN MEDICINE

## 2021-06-22 PROCEDURE — 2709999900 HC NON-CHARGEABLE SUPPLY: Performed by: PAIN MEDICINE

## 2021-06-22 PROCEDURE — 6360000002 HC RX W HCPCS: Performed by: PAIN MEDICINE

## 2021-06-22 PROCEDURE — 7100000010 HC PHASE II RECOVERY - FIRST 15 MIN: Performed by: PAIN MEDICINE

## 2021-06-22 PROCEDURE — 62323 NJX INTERLAMINAR LMBR/SAC: CPT | Performed by: PAIN MEDICINE

## 2021-06-22 PROCEDURE — 2500000003 HC RX 250 WO HCPCS: Performed by: PAIN MEDICINE

## 2021-06-22 PROCEDURE — 3209999900 FLUORO FOR SURGICAL PROCEDURES

## 2021-06-22 PROCEDURE — 3600000054 HC PAIN LEVEL 3 BASE: Performed by: PAIN MEDICINE

## 2021-06-22 RX ORDER — SODIUM CHLORIDE 9 MG/ML
INJECTION INTRAVENOUS PRN
Status: DISCONTINUED | OUTPATIENT
Start: 2021-06-22 | End: 2021-06-22 | Stop reason: ALTCHOICE

## 2021-06-22 RX ORDER — DEXAMETHASONE SODIUM PHOSPHATE 4 MG/ML
INJECTION, SOLUTION INTRA-ARTICULAR; INTRALESIONAL; INTRAMUSCULAR; INTRAVENOUS; SOFT TISSUE PRN
Status: DISCONTINUED | OUTPATIENT
Start: 2021-06-22 | End: 2021-06-22 | Stop reason: ALTCHOICE

## 2021-06-22 RX ORDER — LIDOCAINE HYDROCHLORIDE 10 MG/ML
INJECTION, SOLUTION INFILTRATION; PERINEURAL PRN
Status: DISCONTINUED | OUTPATIENT
Start: 2021-06-22 | End: 2021-06-22 | Stop reason: ALTCHOICE

## 2021-06-22 ASSESSMENT — PAIN - FUNCTIONAL ASSESSMENT: PAIN_FUNCTIONAL_ASSESSMENT: 0-10

## 2021-06-22 ASSESSMENT — PAIN DESCRIPTION - DESCRIPTORS: DESCRIPTORS: STABBING

## 2021-06-22 ASSESSMENT — PAIN SCALES - GENERAL: PAINLEVEL_OUTOF10: 0

## 2021-06-22 NOTE — OP NOTE
Operative Note    Pre-Procedure Note    Patient Name: Christiane Diaz   YOB: 1960  Medical Record Number: [id number]  Date: 6/22/21       Indication:  L-SPINAL STENOSIS  Consent: On file. Vital Signs:   Vitals:    06/22/21 1048   BP: 138/87   Pulse: 84   Resp: 16   Temp: 97.9 °F (36.6 °C)   SpO2: 97%       Past Medical History:   has a past medical history of Bipolar 1 disorder ([facility] [address] COPD (chronic obstructive pulmonary disease) ([facility] [address] Hypertension, and Lumbar radiculopathy. Past Surgical History:   has a past surgical history that includes Foot surgery (Right); Tonsillectomy; Colonoscopy (Left, 6/21/2019); Upper gastrointestinal endoscopy (Left, 6/21/2019); Pain management procedure (Right, 2/8/2021); hip surgery (Right, 3/15/2021); and Pain management procedure (Right, 4/20/2021). Pre-Sedation Documentation and Exam:   Vital signs have been reviewed (see flow sheet for vitals). Sedation/Anesthesia Plan:   LOCAL    Medications Planned:   Per Anesthesia. Patient is an appropriate candidate for plan of sedation: yes      Preoperative Diagnosis:  L-radiculitis, L-spinal  Stenosis        Post-Op Dx: as above     Procedure Performed:  Lumbar epidural steroid injection under fluoroscopy guidance # 2. .     Indication for the Procedure: The patient failed conservative management  for pain in the low back radiating to lower extremities. The patient is undergoing lumbar epidural steroid injection. As the patient is not responding to conservative management and it is interfering with activities of daily living we decided to proceed with lumbar epidural steroid injection. The procedure and risks were discussed with the patient and an informed consent was obtained     Procedure:       The patient is placed in prone position. Skin over the back was prepped and draped in sterile manner.  Then using fluoroscopy the L5 interspace was observed and the skin and deep tissues in the left paramedian area were infiltrated with 12 ml of 1% lidocaine. The #20-gauge, 3-1/2 inch Tuohy needle was inserted through the skin wheal and the epidural space was identified using loss of resistance technique . This was confirmed with AP and lateral views using fluoroscopy after injecting about 2 ml of Omnipaque-180 and observing the spread of the contrast in the epidural space. Then after negative aspiration a total of 12 mg of dexamethasone  with 4 ml of normal saline was injected into the epidural space. The needle is removed and a Band-Aid was placed over the needle insertion site. No paresthesia. EBL-0  Patient's vital signs remained stable and the patient tolerated the procedure well. The patient will be discharged home in stable condition and will be followed in the office in the next few weeks for further planning.       Electronically signed by James Reagan MD on 6/22/21 at 11:27 AM EDT

## 2021-06-22 NOTE — ED PROVIDER NOTES
[facility]  eMERGENCY dEPARTMENT eNCOUnter          CHIEF COMPLAINT       Chief Complaint   Patient presents with   [facility]       Nurses Notes reviewed and I agree except as noted in the HPI. HISTORY OF PRESENT ILLNESS    Ernestine Estrada is a 61 y.o. male who presents with poison ivy of the wrist and groin. Apparently he was outside working and he got into it. He has been there for several days it is starting to worsen. He denies any throat swelling lip swelling or tongue swelling. He is not having difficulty breathing. He has constant itching for what she is using topicals on right now. Patient states that when he gets poison ivy gets it very bad. Currently the patient is resting comfortably on the cot no apparent distress no other physical complaints at this time. REVIEW OF SYSTEMS     Review of Systems   Constitutional: Negative for activity change, appetite change, diaphoresis, fatigue and unexpected weight change. HENT: Negative for congestion, ear discharge, ear pain, hearing loss, rhinorrhea, sinus pressure, sore throat, trouble swallowing and voice change. Eyes: Negative for photophobia, pain, discharge, redness and itching. Respiratory: Negative for cough, choking, chest tightness, shortness of breath and wheezing. Cardiovascular: Negative for chest pain, palpitations and leg swelling. Gastrointestinal: Negative for abdominal distention, abdominal pain, blood in stool, constipation, diarrhea, nausea and vomiting. Endocrine: Negative for polydipsia, polyphagia and polyuria. Genitourinary: Negative for decreased urine volume, difficulty urinating, dysuria, enuresis, frequency, hematuria and urgency. Musculoskeletal: Negative for arthralgias, back pain, gait problem, myalgias, neck pain and neck stiffness. Skin: Positive for rash. Negative for pallor. Allergic/Immunologic: Negative for immunocompromised state.    Neurological: Negative for dizziness, indicated that his mother is alive. He indicated that his father is . family history includes Diabetes in his father and mother. SOCIAL HISTORY      reports that he has been smoking cigarettes. He started smoking about 38 years ago. He has a 7.50 pack-year smoking history. He has never used smokeless tobacco. He reports previous alcohol use. He reports that he does not use drugs. PHYSICAL EXAM     INITIAL VITALS:  height is 6' 1\" (1.854 m) and weight is 170 lb (77.1 kg). His oral temperature is 97.7 °F (36.5 °C). His blood pressure is 135/81 and his pulse is 96. His respiration is 18 and oxygen saturation is 96%. Physical Exam  Vitals and nursing note reviewed. Constitutional:       General: He is not in acute distress. Appearance: He is well-developed. He is not diaphoretic. HENT:      Head: Normocephalic and atraumatic. Right Ear: External ear normal.      Left Ear: External ear normal.      Nose: Nose normal.   Eyes:      General: Lids are normal. No scleral icterus. Right eye: No discharge. Left eye: No discharge. Conjunctiva/sclera: Conjunctivae normal.      Right eye: No exudate. Left eye: No exudate. Pupils: Pupils are equal, round, and reactive to light. Neck:      Thyroid: No thyromegaly. Vascular: No JVD. Trachea: No tracheal deviation. Cardiovascular:      Rate and Rhythm: Normal rate and regular rhythm. Pulses: Normal pulses. Heart sounds: Normal heart sounds, S1 normal and S2 normal. No murmur heard. No friction rub. No gallop. Pulmonary:      Effort: Pulmonary effort is normal. No respiratory distress. Breath sounds: Normal breath sounds. No stridor. No wheezing or rales. Chest:      Chest wall: No tenderness. Abdominal:      General: Bowel sounds are normal. There is no distension. Palpations: Abdomen is soft. There is no mass. Tenderness: There is no abdominal tenderness.  There is no guarding or rebound. Musculoskeletal:         General: No tenderness. Normal range of motion. Cervical back: Normal range of motion and neck supple. Normal range of motion. Lymphadenopathy:      Cervical: No cervical adenopathy. Skin:     General: Skin is warm and dry. Findings: Rash present. No abrasion, abscess, acne, bruising, burn, ecchymosis, erythema, signs of injury, laceration, lesion, petechiae or wound. Neurological:      Mental Status: He is alert and oriented to person, place, and time. Cranial Nerves: No cranial nerve deficit. Sensory: No sensory deficit. Coordination: Coordination normal.      Deep Tendon Reflexes: Reflexes are normal and symmetric. Psychiatric:         Speech: Speech normal.         Behavior: Behavior normal.         Thought Content: Thought content normal.         Judgment: Judgment normal.           DIFFERENTIAL DIAGNOSIS:   Poison ivy, poison sumac, poison oak    DIAGNOSTIC RESULTS     EKG: All EKG's are interpreted by the Emergency Department Physician who either signs or Co-signs this chart in the absence of a cardiologist.  None    RADIOLOGY: non-plain film images(s) such as CT, Ultrasound and MRI are read by the radiologist.  None. LABS:   Labs Reviewed - No data to display    EMERGENCY DEPARTMENT COURSE:   Vitals:    Vitals:    06/21/21 2247   BP: 135/81   Pulse: 96   Resp: 18   Temp: 97.7 °F (36.5 °C)   TempSrc: Oral   SpO2: 96%   Weight: 170 lb (77.1 kg)   Height: 6' 1\" (1.854 m)     Patient is is that is a decision to treat was made. Patient will be given a steroid shot here. He will be given a course of steroids for home. He is encouraged to continue to use topical as directed. He is instructed to follow-up with his primary care physician to do so within the next 1 to 2 days. He is instructed return to the emergency room for any new or worsening complaint. Patient understood and agreed with plan.   Patient is discharged home in good condition. The patient has what appears to be poison ivy of the groin and wrist.  Patient has been given steroids he is instructed to take those as prescribed. Patient is instructed to follow-up with primary care physician and do so within the next 1 to 2 days. Patient is instructed to continue to use his topicals as directed. Patient is instructed to return to the nearest emergency room immediately for any new or worsening complaints. CRITICAL CARE:   None     CONSULTS:  None    PROCEDURES:  None    FINAL IMPRESSION      1.  Poison ivy dermatitis          DISPOSITION/PLAN   Discharge    PATIENT REFERRED TO:  Jasmin Finley DO  [address] [place] [address]  [phone]    Call in 1 day        DISCHARGE MEDICATIONS:  New Prescriptions    PREDNISONE (DELTASONE) 20 MG TABLET    Take 1 tablet by mouth daily for 7 days       (Please note that portions of this note were completed with a voice recognition program.  Efforts were made to edit the dictations but occasionally words are mis-transcribed.)    Gisselle Romano, [address] [place] [address],   06/21/21 0589

## 2021-06-22 NOTE — PROGRESS NOTES
[address] NO COMPLAINT OF PAIN   [address] GIVEN  1154 UP AT SIDE OF BED TO GET DRESSED  [address] [place] [address] CALLED FOR TRANSPORT  1155 GETTING DRESSED  1215 ASSISTED  TO AMBULATE TO TRANSPORTATION VEHICLE WITHOUT PROBLEM

## 2021-06-22 NOTE — H&P
H&P    FU from [place] # 1 from 4/20/2021. Received about 80% relief of right leg pain for 1 full week and then pain came right back. Was able to tolerate full activity for that week. Denies any pain in lower back pain is down right leg from right side hip radiates down lateral and anterior leg to about right knee- sharp, pain with tingling and increased with walking.     Denies any neck pain today   States that he lost his pain medication while shopping and misplaced them and does not know what happened to them and states that he is suffering d/t that. Discussed we will not fill early and if it happens again he will be discharged.      Medications reviewed. Patient denies side effects with medications. Patient states he is taking medications as prescribed. Hedenies receiving pain medications from other sources. He had 1 ER visit since last visit.      Pain scale with out pain medications or at its worst is 10/10. Pain scale with pain medications or at its best is 0/10. Last dose of Norco was over a week ago   Drug screen reviewed from 3/16/2021 and was appropriate per note   Pill count was not completed today patient misplaced medications   Patient does not have naloxone available at home. Patient has not required use of naloxone at home since last office visit.         The patienthas No Known Allergies.        Subjective:      Review of Systems   Constitutional: Negative. Musculoskeletal: Positive for arthralgias, back pain, gait problem, myalgias, neck pain and neck stiffness. Ambulating with strait cane    Neurological: Positive for weakness and numbness. Psychiatric/Behavioral: Negative.          Objective:      Vitals       Vitals:     06/02/21 0723   BP: 138/88   Weight: 192 lb (87.1 kg)   Height: 6' 2\" (1.88 m)            Physical Exam  Vitals and nursing note reviewed. Constitutional:       General: He is not in acute distress. Appearance: He is well-developed. He is not diaphoretic. Thought Content: Thought content normal. Thought content is not paranoid or delusional. Thought content does not include homicidal or suicidal ideation. Thought content does not include homicidal or suicidal plan. Cognition and Memory: Cognition normal. Memory is not impaired. He does not exhibit impaired recent memory or impaired remote memory. Judgment: Judgment is not impulsive or inappropriate.         IGNACIA test: + right   Yeomans test: + right   Gaenslen test: + right   Assessment:      1. Lumbosacral radiculitis    2. Spinal stenosis of lumbar region without neurogenic claudication    3. Lumbar spondylosis    4. Right leg pain    5. Chronic pain syndrome    6. Neuropathy    7. Primary osteoarthritis of right hip       Plan:      · OARRS reviewed. Current MED: 20.00  · Patient was offered naloxone for home. · Discussed long term side effects of medications, tolerance, dependency and addiction. · Previous UDS reviewed  · UDS preformed today for compliance. · Patient told can not receive any pain medications from any other source. · No evidence of abuse, diversion or aberrant behavior. · Medications and/or procedures to improve function and quality of life- patient understanding with this and that may not be pain free  · Discussed with patient about safe storage of medications at home  · Discussed possible weaning of medication dosing dependent on treatment/procedure results. · Discussed with patient about risks with procedure including infection, reaction to medication, increased pain, or bleeding. · Procedure notes reveiwed in detail  · Only 1 day of relief from previous right TFLESI   · Received over 80% relief from right LESI # 1 for 1 full week with full ROM. · Plan to repeat right LESI but at L3. Procedure and risks discussed in detail with patient.    · Discussed SCS if LESI does not work or last. Gave information   · Continue Norco 5 prn q 6 hrs prn- filled 3/17/2021 patient misplaced them will not ill early and he has last chance if this happens again will not prescribe. · Continue Neurontin- 300 mg BID- has plenty  · Reviewed neck xray- neck pain resolved.    · Will order next UDS at [facility] because it will be negative today               Return for right JAIRO # 2 @ L3. , follow up after procedure.

## 2021-06-22 NOTE — ED TRIAGE NOTES
Pt to er. Pt c/o poison ivy in groin area and on face for a week. States been using OTC stuff but not helping.

## 2021-06-22 NOTE — PROGRESS NOTES
Patient requests to have procedure done as local rather than MAC. Dr. Lata Lira and Dr. Mir Mccabe informed and ok to have procedure done as local. Patient updated.

## 2021-07-08 ENCOUNTER — HOSPITAL ENCOUNTER (EMERGENCY)
Age: 61
Discharge: HOME | End: 2021-07-08
Payer: MEDICARE

## 2021-07-08 VITALS
SYSTOLIC BLOOD PRESSURE: 135 MMHG | DIASTOLIC BLOOD PRESSURE: 92 MMHG | OXYGEN SATURATION: 99 % | HEART RATE: 84 BPM | TEMPERATURE: 98 F | WEIGHT: 185 LBS | RESPIRATION RATE: 18 BRPM | HEIGHT: 73 IN | BODY MASS INDEX: 24.52 KG/M2

## 2021-07-08 DIAGNOSIS — M79.606 CHRONIC PAIN OF LOWER EXTREMITY, UNSPECIFIED LATERALITY: Primary | ICD-10-CM

## 2021-07-08 DIAGNOSIS — G89.29 CHRONIC PAIN OF LOWER EXTREMITY, UNSPECIFIED LATERALITY: Primary | ICD-10-CM

## 2021-07-08 PROCEDURE — 96372 THER/PROPH/DIAG INJ SC/IM: CPT

## 2021-07-08 PROCEDURE — 6370000000 HC RX 637 (ALT 250 FOR IP): Performed by: NURSE PRACTITIONER

## 2021-07-08 PROCEDURE — 6360000002 HC RX W HCPCS: Performed by: NURSE PRACTITIONER

## 2021-07-08 PROCEDURE — 99283 EMERGENCY DEPT VISIT LOW MDM: CPT

## 2021-07-08 RX ORDER — ORPHENADRINE CITRATE 30 MG/ML
60 INJECTION INTRAMUSCULAR; INTRAVENOUS ONCE
Status: COMPLETED | OUTPATIENT
Start: 2021-07-08 | End: 2021-07-08

## 2021-07-08 RX ORDER — IBUPROFEN 800 MG/1
800 TABLET ORAL ONCE
Status: COMPLETED | OUTPATIENT
Start: 2021-07-08 | End: 2021-07-08

## 2021-07-08 RX ORDER — METHYLPREDNISOLONE SODIUM SUCCINATE 125 MG/2ML
80 INJECTION, POWDER, LYOPHILIZED, FOR SOLUTION INTRAMUSCULAR; INTRAVENOUS ONCE
Status: COMPLETED | OUTPATIENT
Start: 2021-07-08 | End: 2021-07-08

## 2021-07-08 RX ADMIN — ORPHENADRINE CITRATE 60 MG: 30 INJECTION INTRAMUSCULAR; INTRAVENOUS at 03:48

## 2021-07-08 RX ADMIN — IBUPROFEN 800 MG: 800 TABLET, FILM COATED ORAL at 03:48

## 2021-07-08 RX ADMIN — METHYLPREDNISOLONE SODIUM SUCCINATE 80 MG: 125 INJECTION, POWDER, FOR SOLUTION INTRAMUSCULAR; INTRAVENOUS at 03:48

## 2021-07-08 ASSESSMENT — PAIN DESCRIPTION - LOCATION: LOCATION: LEG

## 2021-07-08 ASSESSMENT — PAIN DESCRIPTION - PAIN TYPE: TYPE: ACUTE PAIN

## 2021-07-08 ASSESSMENT — PAIN DESCRIPTION - ORIENTATION: ORIENTATION: RIGHT

## 2021-07-08 ASSESSMENT — PAIN SCALES - GENERAL: PAINLEVEL_OUTOF10: 8

## 2021-07-08 ASSESSMENT — PAIN DESCRIPTION - FREQUENCY: FREQUENCY: CONTINUOUS

## 2021-07-08 ASSESSMENT — PAIN DESCRIPTION - ONSET: ONSET: ON-GOING

## 2021-07-08 NOTE — ED TRIAGE NOTES
Pt presents to the ED with c/o RT leg pain. Pt states he had an epidural injection in June which helped with his pain. Pt states 2 days ago he started having RT leg pain. Pt reports 8/10 pain worse after sitting for awhile or walking.

## 2021-07-13 ENCOUNTER — HOSPITAL ENCOUNTER (OUTPATIENT)
Dept: ULTRASOUND IMAGING | Age: 61
Discharge: HOME | End: 2021-07-13
Payer: OTHER GOVERNMENT

## 2021-07-13 DIAGNOSIS — E04.2 NONTOXIC MULTINODULAR GOITER: ICD-10-CM

## 2021-07-13 PROCEDURE — 76536 US EXAM OF HEAD AND NECK: CPT

## 2021-07-15 ASSESSMENT — ENCOUNTER SYMPTOMS
BACK PAIN: 0
COUGH: 0
CHEST TIGHTNESS: 0
NAUSEA: 0
ABDOMINAL PAIN: 0
SHORTNESS OF BREATH: 0
EYE REDNESS: 0
VOMITING: 0
RHINORRHEA: 0

## 2021-07-15 NOTE — ED PROVIDER NOTES
[facility] Emergency Department    CHIEF COMPLAINT       Chief Complaint   Patient presents with    Leg Pain       Nurses Notes reviewed and I agree except as noted in the HPI. HISTORY OF PRESENT ILLNESS    Jess Yanes loki 61 y.o. male who presents to the ED for evaluation of right leg pain. This is chronic in nature. He gets epidural injections for the pain. He states that the epidural worse off two days ago and his pain has returned. He walks a lot for work and is requesting steroids and a toradol injection. Pain is same as previous. No loss of B/B. No saddle anesthesia. HPI was provided by the patient    REVIEW OF SYSTEMS     Review of Systems   Constitutional: Negative for chills, fatigue and fever. HENT: Negative for congestion, ear discharge, ear pain, postnasal drip and rhinorrhea. Eyes: Negative for redness. Respiratory: Negative for cough, chest tightness and shortness of breath. Cardiovascular: Negative for chest pain and leg swelling. Gastrointestinal: Negative for abdominal pain, nausea and vomiting. Genitourinary: Negative for difficulty urinating, dysuria, enuresis, flank pain and hematuria. Musculoskeletal: Positive for arthralgias and myalgias. Negative for back pain and joint swelling. Skin: Negative for rash and wound. Neurological: Negative for dizziness, light-headedness, numbness and headaches. Psychiatric/Behavioral: Negative for agitation, behavioral problems and confusion. All other systems negative except as noted. PAST MEDICAL HISTORY     Past Medical History:   Diagnosis Date    B[facility] 1 [facility] ([facility] [address]     COPD (chronic obstructive pulmonary disease) ([facility] [address]     Hypertension     Lumbar radiculopathy        SURGICALHISTORY      has a past surgical history that includes Foot surgery (Right); Tonsillectomy; Colonoscopy (Left, 6/21/2019); Upper gastrointestinal endoscopy (Left, 6/21/2019);  Pain management procedure (Right, 2/8/2021); hip surgery (Right, 3/15/2021); Pain management procedure (Right, 2021); and Pain management procedure (Right, 2021). CURRENT MEDICATIONS       Discharge Medication List as of 2021  3:51 AM      CONTINUE these medications which have NOT CHANGED    Details   lactase (LACTAID) 3000 units tablet Take 1 tablet by mouth 3 times daily (with meals), Disp-30 tablet, R-0Print      Calcium Polycarbophil (FIBER) 625 MG TABS Take 1 tablet by mouth daily, Disp-14 tablet, R-0Print      FLUoxetine (PROZAC) 20 MG capsule Take 20 mg by mouth daily. metoprolol (TOPROL-XL) 50 MG XL tablet Take 50 mg by mouth daily. ALLERGIES     has No Known Allergies. FAMILY HISTORY     He indicated that his mother is alive. He indicated that his father is . family history includes Diabetes in his father and mother. SOCIAL HISTORY       Social History     Socioeconomic History    Marital status: Single     Spouse name: Not on file    Number of children: Not on file    Years of education: Not on file    Highest education level: Not on file   Occupational History    Not on file   Tobacco Use    Smoking status: Current Every Day Smoker     Packs/day: 0.25     Years: 30.00     Pack years: 7.50     Types: Cigarettes     Start date: 1983    Smokeless tobacco: Never Used   Vaping Use    Vaping Use: Never used   Substance and Sexual Activity    Alcohol use: Not Currently     Comment: occasionally    Drug use: No     Comment: hx of    Sexual activity: Yes     Partners: Female   Other Topics Concern    Not on file   Social History Narrative    Not on file     Social Determinants of Health     Financial Resource Strain:     Difficulty of Paying Living Expenses:    Food Insecurity:     Worried About Running Out of Food in the Last Year:     Ran Out of Food in the Last Year:    Transportation Needs:     Lack of Transportation (Medical):      Lack of Transportation (Non-Medical):    Physical in the absence of a cardiologist.        RADIOLOGY: non-plainfilm images(s) such as CT, Ultrasound and MRI are read by the radiologist.  Plain radiographic images are visualized and preliminarily interpreted by the emergency physician unless otherwise stated below. No orders to display         LABS:   Labs Reviewed - No data to display    EMERGENCY DEPARTMENT COURSE:   Vitals:    Vitals:    07/08/21 0300   BP: (!) 135/92   Pulse: 84   Resp: 18   Temp: 98 °F (36.7 °C)   TempSrc: Oral   SpO2: 99%   Weight: 185 lb (83.9 kg)   Height: 6' 1\" (1.854 m)          [facility]    Patient was seen in the ER for an exacerbation of chronic pain. No labs or imaging are necessary as this is not new. Patient is treated with solumedrol, ibuprofen and norflex with good result. Patient will be discharged home with follow up    Medications   methylPREDNISolone sodium (SOLU-MEDROL) injection 80 mg (80 mg Intramuscular Given 7/8/21 0348)   ibuprofen (ADVIL;MOTRIN) tablet 800 mg (800 mg Oral Given 7/8/21 0348)   orphenadrine (NORFLEX) injection 60 mg (60 mg Intramuscular Given 7/8/21 0348)         Patient was seen independently by myself. The patient's final impression and disposition and plan was determined by myself. Strict return precautions and follow up instructions were discussed with the patient prior to discharge, with which the patient agrees. Physical assessment findings, diagnostic testing(s) if applicable, and vital signs reviewed with patient/patient representative. Questions answered. Medications asdirected, including OTC medications for supportive care. Education provided on medications. Differential diagnosis(s) discussed with patient/patient representative. Home care/self care instructions reviewed withpatient/patient representative. Patient is to follow-up with family care provider in 2-3 days if no improvement.   Patient is to go to the emergency department if symptoms worsen. Patient/patient representative isaware of care plan, questions answered, verbalizes understanding and is in agreement. CRITICAL CARE:   None    CONSULTS:  None    PROCEDURES:  None    FINAL IMPRESSION     1.  Chronic pain of lower extremity, unspecified laterality          DISPOSITION/PLAN   DISPOSITION Decision To Discharge 07/08/2021 03:50:17 AM      PATIENT REFERREDTO:  Jackie June [address] [phone]    Schedule an appointment as soon as possible for a visit   For follow up      DISCHARGE MEDICATIONS:  Discharge Medication List as of 7/8/2021  3:51 AM          (Please note that portions of this note were completed with a voice recognition program.  Efforts were made to edit the dictations but occasionally words are mis-transcribed.)         Leary Essex, APRN - CNP Leary Essex, APRN - KT  07/15/21 [id number]

## 2021-07-18 DIAGNOSIS — F11.90 CHRONIC, CONTINUOUS USE OF OPIOIDS: ICD-10-CM

## 2021-07-18 DIAGNOSIS — M47.816 LUMBAR SPONDYLOSIS: ICD-10-CM

## 2021-07-18 DIAGNOSIS — M48.061 SPINAL STENOSIS OF LUMBAR REGION WITHOUT NEUROGENIC CLAUDICATION: ICD-10-CM

## 2021-07-18 DIAGNOSIS — G89.4 CHRONIC PAIN SYNDROME: ICD-10-CM

## 2021-07-18 DIAGNOSIS — M79.604 RIGHT LEG PAIN: ICD-10-CM

## 2021-07-18 DIAGNOSIS — M54.17 LUMBOSACRAL RADICULITIS: ICD-10-CM

## 2021-07-19 RX ORDER — GABAPENTIN 300 MG/1
CAPSULE ORAL
Qty: 60 CAPSULE | Refills: 1 | Status: SHIPPED | OUTPATIENT
Start: 2021-07-19 | End: 2021-09-16 | Stop reason: SDUPTHER

## 2021-07-19 RX ORDER — HYDROCODONE BITARTRATE AND ACETAMINOPHEN 5; 325 MG/1; MG/1
TABLET ORAL
Qty: 120 TABLET | Refills: 0 | Status: SHIPPED | OUTPATIENT
Start: 2021-07-19 | End: 2021-08-16

## 2021-07-22 ENCOUNTER — APPOINTMENT (OUTPATIENT)
Dept: GENERAL RADIOLOGY | Age: 61
End: 2021-07-22
Payer: COMMERCIAL

## 2021-07-22 ENCOUNTER — HOSPITAL ENCOUNTER (EMERGENCY)
Age: 61
Discharge: HOME | End: 2021-07-22
Payer: COMMERCIAL

## 2021-07-22 VITALS
RESPIRATION RATE: 18 BRPM | WEIGHT: 189 LBS | HEIGHT: 73 IN | TEMPERATURE: 98.1 F | SYSTOLIC BLOOD PRESSURE: 144 MMHG | OXYGEN SATURATION: 99 % | HEART RATE: 85 BPM | DIASTOLIC BLOOD PRESSURE: 94 MMHG | BODY MASS INDEX: 25.05 KG/M2

## 2021-07-22 DIAGNOSIS — S93.602A SPRAIN OF LEFT FOOT, INITIAL ENCOUNTER: Primary | ICD-10-CM

## 2021-07-22 PROCEDURE — 73610 X-RAY EXAM OF ANKLE: CPT

## 2021-07-22 PROCEDURE — 96372 THER/PROPH/DIAG INJ SC/IM: CPT

## 2021-07-22 PROCEDURE — 99283 EMERGENCY DEPT VISIT LOW MDM: CPT

## 2021-07-22 PROCEDURE — 6360000002 HC RX W HCPCS: Performed by: NURSE PRACTITIONER

## 2021-07-22 PROCEDURE — 73630 X-RAY EXAM OF FOOT: CPT

## 2021-07-22 RX ORDER — KETOROLAC TROMETHAMINE 30 MG/ML
30 INJECTION, SOLUTION INTRAMUSCULAR; INTRAVENOUS ONCE
Status: COMPLETED | OUTPATIENT
Start: 2021-07-22 | End: 2021-07-22

## 2021-07-22 RX ORDER — IBUPROFEN 800 MG/1
800 TABLET ORAL EVERY 8 HOURS PRN
Qty: 30 TABLET | Refills: 0 | Status: SHIPPED | OUTPATIENT
Start: 2021-07-22 | End: 2022-01-31

## 2021-07-22 RX ADMIN — KETOROLAC TROMETHAMINE 30 MG: 30 INJECTION, SOLUTION INTRAMUSCULAR; INTRAVENOUS at 02:00

## 2021-07-22 ASSESSMENT — PAIN DESCRIPTION - ORIENTATION: ORIENTATION: LEFT

## 2021-07-22 ASSESSMENT — PAIN DESCRIPTION - LOCATION: LOCATION: FOOT

## 2021-07-22 ASSESSMENT — PAIN SCALES - GENERAL
PAINLEVEL_OUTOF10: 10
PAINLEVEL_OUTOF10: 8

## 2021-07-22 NOTE — ED TRIAGE NOTES
Pt to the ED via ED lobby with complaint of left foot pain that happened 3 days ago when he twisted his ankle. Pt stated that he woke up today and could not move foot or walk on it. Pt stated he has used ice and elevated foot with no relief. Pt took tylenol for pain. Pain rated 10/10. VSS. Pt alert and oriented x4. Respirations even and unlabored.

## 2021-07-24 ASSESSMENT — ENCOUNTER SYMPTOMS
COUGH: 0
VOMITING: 0
RHINORRHEA: 0
EYE REDNESS: 0
NAUSEA: 0
ABDOMINAL PAIN: 0
BACK PAIN: 0
CHEST TIGHTNESS: 0

## 2021-07-24 NOTE — ED PROVIDER NOTES
[facility] [address]       Chief Complaint   Patient presents with    Foot Pain     left       Nurses Notes reviewed and I agree except as noted in the HPI. HISTORY OF PRESENT ILLNESS    Margaux Marcos loki 61 y.o. male who presents to the ED for evaluation of left foot pain. The patient states he twisted his foot and ankle and now has pain on the lateral left foot. HPI was provided by the patient    REVIEW OF SYSTEMS     Review of Systems   Constitutional: Negative for chills, fatigue and fever. HENT: Negative for congestion, ear discharge, ear pain, postnasal drip and rhinorrhea. Eyes: Negative for redness. Respiratory: Negative for cough and chest tightness. Cardiovascular: Negative for chest pain and leg swelling. Gastrointestinal: Negative for abdominal pain, nausea and vomiting. Genitourinary: Negative for difficulty urinating, dysuria, enuresis, flank pain and hematuria. Musculoskeletal: Positive for arthralgias and joint swelling. Negative for back pain. Skin: Negative for rash and wound. Neurological: Negative for dizziness, light-headedness, numbness and headaches. Psychiatric/Behavioral: Negative for agitation, behavioral problems and confusion. All other systems negative except as noted. PAST MEDICAL HISTORY     Past Medical History:   Diagnosis Date    Bipolar 1 disorder ([facility] [address]     COPD (chronic obstructive pulmonary disease) ([facility] [address]     Hypertension     Lumbar radiculopathy        SURGICALHISTORY      has a past surgical history that includes Foot surgery (Right); Tonsillectomy; Colonoscopy (Left, 6/21/2019); Upper gastrointestinal endoscopy (Left, 6/21/2019); Pain management procedure (Right, 2/8/2021); hip surgery (Right, 3/15/2021); Pain management procedure (Right, 4/20/2021); and Pain management procedure (Right, 6/22/2021).     CURRENT MEDICATIONS       Discharge Medication List as of 7/22/2021  3:44 AM      CONTINUE these medications which have NOT CHANGED    Details   HYDROcodone-acetaminophen (NORCO) 5-325 MG per tablet take 1 tablet by mouth every 6 hours if needed for pain, Disp-120 tablet, R-0Normal      gabapentin (NEURONTIN) 300 MG capsule take 1 capsule by mouth twice a day, Disp-60 capsule, R-1Normal      lactase (LACTAID) 3000 units tablet Take 1 tablet by mouth 3 times daily (with meals), Disp-30 tablet, R-0Print      Calcium Polycarbophil (FIBER) 625 MG TABS Take 1 tablet by mouth daily, Disp-14 tablet, R-0Print      FLUoxetine (PROZAC) 20 MG capsule Take 20 mg by mouth daily. metoprolol (TOPROL-XL) 50 MG XL tablet Take 50 mg by mouth daily. ALLERGIES     has No Known Allergies. FAMILY HISTORY     He indicated that his mother is alive. He indicated that his father is . family history includes Diabetes in his father and mother. SOCIAL HISTORY       Social History     Socioeconomic History    Marital status: Single     Spouse name: Not on file    Number of children: Not on file    Years of education: Not on file    Highest education level: Not on file   Occupational History    Not on file   Tobacco Use    Smoking status: Current Every Day Smoker     Packs/day: 0.25     Years: 30.00     Pack years: 7.50     Types: Cigarettes     Start date: 1983    Smokeless tobacco: Never Used   Vaping Use    Vaping Use: Never used   Substance and Sexual Activity    Alcohol use: Not Currently     Comment: occasionally    Drug use: No     Comment: hx of    Sexual activity: Yes     Partners: Female   Other Topics Concern    Not on file   Social History Narrative    Not on file     Social Determinants of Health     Financial Resource Strain:     Difficulty of Paying Living Expenses:    Food Insecurity:     Worried About Running Out of Food in the Last Year:     Ran Out of Food in the Last Year:    Transportation Needs:     Lack of Transportation (Medical):      Lack of Transportation (Non-Medical):    Physical Activity:     Days of Exercise per Week:     Minutes of Exercise per Session:    Stress:     Feeling of Stress :    Social Connections:     Frequency of Communication with Friends and Family:     Frequency of Social Gatherings with Friends and Family:     Attends Jew Services:     Active Member of Clubs or Organizations:     Attends Club or Organization Meetings:     Marital Status:    Intimate Partner Violence:     Fear of Current or Ex-Partner:     Emotionally Abused:     Physically Abused:     Sexually Abused:        PHYSICAL EXAM     INITIAL VITALS:  height is 6' 1\" (1.854 m) and weight is 189 lb (85.7 kg). His oral temperature is 98.1 °F (36.7 °C). His blood pressure is 144/94 (abnormal) and his pulse is 85. His respiration is 18 and oxygen saturation is 99%. Physical Exam  Constitutional:       Appearance: Normal appearance. He is well-developed. He is not ill-appearing. HENT:      Head: Normocephalic and atraumatic. Nose: Nose normal.      Mouth/Throat:      Mouth: Mucous membranes are moist.      Pharynx: Oropharynx is clear. Eyes:      Conjunctiva/sclera: Conjunctivae normal.   Cardiovascular:      Rate and Rhythm: Normal rate. Pulses: Normal pulses. Pulmonary:      Effort: Pulmonary effort is normal.   Abdominal:      Palpations: Abdomen is soft. Musculoskeletal:         General: Swelling, tenderness and signs of injury present. Normal range of motion. Cervical back: Normal range of motion. Feet:    Skin:     General: Skin is warm and dry. Capillary Refill: Capillary refill takes less than 2 seconds. Neurological:      General: No focal deficit present. Mental Status: He is alert and oriented to person, place, and time.    Psychiatric:         Behavior: Behavior normal.         DIFFERENTIAL DIAGNOSIS:   Sprain, strain, fracture, dislocation      DIAGNOSTIC RESULTS     EKG: All EKG's are interpreted by the Emergency Department Physician who eithersigns or Co-signs this chart in the absence of a cardiologist.        RADIOLOGY: non-plainfilm images(s) such as CT, Ultrasound and MRI are read by the radiologist.  Plain radiographic images are visualized and preliminarily interpreted by the emergency physician unless otherwise stated below. XR ANKLE LEFT (MIN 3 VIEWS)   Final Result   No acute fracture or dislocation. This document has been electronically signed by: Dalila Rocha MD on    07/22/2021 02:41 AM      XR FOOT LEFT (MIN 3 VIEWS)   Final Result   No acute fracture or dislocation. Degenerative changes. This document has been electronically signed by: Dalila Rocha MD on    07/22/2021 02:44 AM            LABS:   Labs Reviewed - No data to display    EMERGENCY DEPARTMENT COURSE:   Vitals:    Vitals:    07/22/21 0114   BP: (!) 144/94   Pulse: 85   Resp: 18   Temp: 98.1 °F (36.7 °C)   TempSrc: Oral   SpO2: 99%   Weight: 189 lb (85.7 kg)   Height: 6' 1\" (1.854 m)          [facility]    Patient was seen in the ER for a left foot injury. Appropriate imaging is ordered and reviewed. Xrays are negative for fracture. This appears to be a sprain injury. Patient is placed in an ace wrap and a post op shoe. Follow up as directed. Medications   ketorolac (TORADOL) injection 30 mg (30 mg Intramuscular Given 7/22/21 0200)         Patient was seen independently by myself. The patient's final impression and disposition and plan was determined by myself. Strict return precautions and follow up instructions were discussed with the patient prior to discharge, with which the patient agrees. Physical assessment findings, diagnostic testing(s) if applicable, and vital signs reviewed with patient/patient representative. Questions answered. Medications asdirected, including OTC medications for supportive care. Education provided on medications.   Differential diagnosis(s) discussed with patient/patient representative. Home care/self care instructions reviewed withpatient/patient representative. Patient is to follow-up with family care provider in 2-3 days if no improvement. Patient is to go to the emergency department if symptoms worsen. Patient/patient representative isaware of care plan, questions answered, verbalizes understanding and is in agreement. CRITICAL CARE:   None    CONSULTS:  None    PROCEDURES:  None    FINAL IMPRESSION     1.  Sprain of left foot, initial encounter          DISPOSITION/PLAN   DISPOSITION Decision To Discharge 07/22/2021 03:43:18 AM      PATIENT REFERREDTO:  India Ruiz DO  [address] [place] [id number]  [phone]    Schedule an appointment as soon as possible for a visit in 2 days  For follow up      DISCHARGE MEDICATIONS:  Discharge Medication List as of 7/22/2021  3:44 AM      START taking these medications    Details   ibuprofen (ADVIL;MOTRIN) 800 MG tablet Take 1 tablet by mouth every 8 hours as needed for Pain, Disp-30 tablet, R-0Normal             (Please note that portions of this note were completed with a voice recognition program.  Efforts were made to edit the dictations but occasionally words are mis-transcribed.)         FRANK Dominguez - FRANK Cardenas CNP  07/24/21 9006

## 2021-08-16 DIAGNOSIS — M47.816 LUMBAR SPONDYLOSIS: ICD-10-CM

## 2021-08-16 DIAGNOSIS — M54.17 LUMBOSACRAL RADICULITIS: ICD-10-CM

## 2021-08-16 DIAGNOSIS — F11.90 CHRONIC, CONTINUOUS USE OF OPIOIDS: ICD-10-CM

## 2021-08-16 DIAGNOSIS — M79.604 RIGHT LEG PAIN: ICD-10-CM

## 2021-08-16 DIAGNOSIS — M48.061 SPINAL STENOSIS OF LUMBAR REGION WITHOUT NEUROGENIC CLAUDICATION: ICD-10-CM

## 2021-08-16 DIAGNOSIS — G89.4 CHRONIC PAIN SYNDROME: ICD-10-CM

## 2021-08-16 RX ORDER — HYDROCODONE BITARTRATE AND ACETAMINOPHEN 5; 325 MG/1; MG/1
TABLET ORAL
Qty: 120 TABLET | Refills: 0 | Status: SHIPPED | OUTPATIENT
Start: 2021-08-18 | End: 2021-09-17

## 2021-08-25 ENCOUNTER — HOSPITAL ENCOUNTER (EMERGENCY)
Age: 61
Discharge: HOME | End: 2021-08-26
Payer: COMMERCIAL

## 2021-08-25 VITALS
BODY MASS INDEX: 25.18 KG/M2 | HEIGHT: 73 IN | WEIGHT: 190 LBS | SYSTOLIC BLOOD PRESSURE: 136 MMHG | TEMPERATURE: 99.9 F | OXYGEN SATURATION: 94 % | DIASTOLIC BLOOD PRESSURE: 89 MMHG | HEART RATE: 95 BPM | RESPIRATION RATE: 20 BRPM

## 2021-08-25 DIAGNOSIS — M54.31 SCIATICA OF RIGHT SIDE: Primary | ICD-10-CM

## 2021-08-25 DIAGNOSIS — M54.16 LUMBAR RADICULITIS: ICD-10-CM

## 2021-08-25 PROCEDURE — 6370000000 HC RX 637 (ALT 250 FOR IP): Performed by: NURSE PRACTITIONER

## 2021-08-25 PROCEDURE — 96372 THER/PROPH/DIAG INJ SC/IM: CPT

## 2021-08-25 PROCEDURE — 6360000002 HC RX W HCPCS: Performed by: NURSE PRACTITIONER

## 2021-08-25 PROCEDURE — 99282 EMERGENCY DEPT VISIT SF MDM: CPT

## 2021-08-25 RX ORDER — CYCLOBENZAPRINE HCL 10 MG
10 TABLET ORAL ONCE
Status: COMPLETED | OUTPATIENT
Start: 2021-08-25 | End: 2021-08-25

## 2021-08-25 RX ORDER — METHYLPREDNISOLONE SODIUM SUCCINATE 125 MG/2ML
125 INJECTION, POWDER, LYOPHILIZED, FOR SOLUTION INTRAMUSCULAR; INTRAVENOUS ONCE
Status: COMPLETED | OUTPATIENT
Start: 2021-08-25 | End: 2021-08-25

## 2021-08-25 RX ORDER — KETOROLAC TROMETHAMINE 30 MG/ML
30 INJECTION, SOLUTION INTRAMUSCULAR; INTRAVENOUS ONCE
Status: COMPLETED | OUTPATIENT
Start: 2021-08-25 | End: 2021-08-25

## 2021-08-25 RX ADMIN — METHYLPREDNISOLONE SODIUM SUCCINATE 125 MG: 125 INJECTION, POWDER, FOR SOLUTION INTRAMUSCULAR; INTRAVENOUS at 23:44

## 2021-08-25 RX ADMIN — CYCLOBENZAPRINE 10 MG: 10 TABLET, FILM COATED ORAL at 23:43

## 2021-08-25 RX ADMIN — KETOROLAC TROMETHAMINE 30 MG: 30 INJECTION, SOLUTION INTRAMUSCULAR; INTRAVENOUS at 23:44

## 2021-08-25 ASSESSMENT — PAIN DESCRIPTION - LOCATION: LOCATION: LEG

## 2021-08-25 ASSESSMENT — PAIN SCALES - GENERAL
PAINLEVEL_OUTOF10: 10
PAINLEVEL_OUTOF10: 8

## 2021-08-25 ASSESSMENT — PAIN DESCRIPTION - ORIENTATION: ORIENTATION: RIGHT;LEFT

## 2021-08-26 NOTE — ED NOTES
Went in to discharge pt and pt was not in room. Pt not in waiting room. Pt left without discharge papers.       Mango Boateng RN  08/26/21 7704

## 2021-08-26 NOTE — ED TRIAGE NOTES
Pt arrives to ER with complaints of bilateral leg pain. Pt reports he receives steroid shots for pain and hes is due. Pt reports he has numbness is his arm from pinches nerve in back. Pt in room shaking arm up and down.

## 2021-08-27 ASSESSMENT — ENCOUNTER SYMPTOMS
ABDOMINAL PAIN: 0
CHEST TIGHTNESS: 0
EYE REDNESS: 0
NAUSEA: 0
BACK PAIN: 1
VOMITING: 0
RHINORRHEA: 0
COUGH: 0

## 2021-08-27 NOTE — ED PROVIDER NOTES
[facility] Emergency Department    CHIEF COMPLAINT       Chief Complaint   Patient presents with    Leg Pain       Nurses Notes reviewed and I agree except as noted in the HPI. HISTORY OF PRESENT ILLNESS    Nicole Oleary loki 64 y.o. male who presents to the ED for evaluation of right leg pain. This pain is chronic. The patient has a hx of sciatica/lumbar radiculopathy that he gets injections for from pain management. He reports that this is the same pain he normally has. He denies any red flag symptoms. He is requesting toradol, solumedrol and flexeril. HPI was provided by the patient    REVIEW OF SYSTEMS     Review of Systems   Constitutional: Negative for chills, fatigue and fever. HENT: Negative for congestion, ear discharge, ear pain, postnasal drip and rhinorrhea. Eyes: Negative for redness. Respiratory: Negative for cough and chest tightness. Cardiovascular: Negative for chest pain and leg swelling. Gastrointestinal: Negative for abdominal pain, nausea and vomiting. Genitourinary: Negative for difficulty urinating, dysuria, enuresis, flank pain and hematuria. Musculoskeletal: Positive for arthralgias and back pain. Negative for joint swelling. Skin: Negative for rash. Neurological: Negative for dizziness, light-headedness, numbness and headaches. Psychiatric/Behavioral: Negative for agitation, behavioral problems and confusion. All other systems negative except as noted. PAST MEDICAL HISTORY     Past Medical History:   Diagnosis Date    [facility] [address]     COPD (chronic obstructive pulmonary disease) ([facility] [address]     Hypertension     Lumbar radiculopathy        SURGICALHISTORY      has a past surgical history that includes Foot surgery (Right); Tonsillectomy; Colonoscopy (Left, 6/21/2019); Upper gastrointestinal endoscopy (Left, 6/21/2019); Pain management procedure (Right, 2/8/2021); hip surgery (Right, 3/15/2021);  Pain management procedure (Right, 2021); and Pain management procedure (Right, 2021). CURRENT MEDICATIONS       Discharge Medication List as of 2021 11:28 PM      CONTINUE these medications which have NOT CHANGED    Details   HYDROcodone-acetaminophen (NORCO) 5-325 MG per tablet take 1 tablet by mouth every 6 hours if needed for pain, Disp-120 tablet, R-0Normal      ibuprofen (ADVIL;MOTRIN) 800 MG tablet Take 1 tablet by mouth every 8 hours as needed for Pain, Disp-30 tablet, R-0Normal      gabapentin (NEURONTIN) 300 MG capsule take 1 capsule by mouth twice a day, Disp-60 capsule, R-1Normal      lactase (LACTAID) 3000 units tablet Take 1 tablet by mouth 3 times daily (with meals), Disp-30 tablet, R-0Print      Calcium Polycarbophil (FIBER) 625 MG TABS Take 1 tablet by mouth daily, Disp-14 tablet, R-0Print      FLUoxetine (PROZAC) 20 MG capsule Take 20 mg by mouth daily. metoprolol (TOPROL-XL) 50 MG XL tablet Take 50 mg by mouth daily. ALLERGIES     has No Known Allergies. FAMILY HISTORY     He indicated that his mother is alive. He indicated that his father is . family history includes Diabetes in his father and mother.     SOCIAL HISTORY       Social History     Socioeconomic History    Marital status: Single     Spouse name: Not on file    Number of children: Not on file    Years of education: Not on file    Highest education level: Not on file   Occupational History    Not on file   Tobacco Use    Smoking status: Current Every Day Smoker     Packs/day: 0.25     Years: 30.00     Pack years: 7.50     Types: Cigarettes     Start date: 1983    Smokeless tobacco: Never Used   Vaping Use    Vaping Use: Never used   Substance and Sexual Activity    Alcohol use: Not Currently     Comment: occasionally    Drug use: No     Comment: hx of    Sexual activity: Yes     Partners: Female   Other Topics Concern    Not on file   Social History Narrative    Not on file     Social Determinants of Health     Financial Resource Strain:     Difficulty of Paying Living Expenses:    Food Insecurity:     Worried About Running Out of Food in the Last Year:     [address] in the Last Year:    Transportation Needs:     Lack of Transportation (Medical):  Lack of Transportation (Non-Medical):    Physical Activity:     Days of Exercise per Week:     Minutes of Exercise per Session:    Stress:     Feeling of Stress :    Social Connections:     Frequency of Communication with Friends and Family:     Frequency of Social Gatherings with Friends and Family:     Attends [employer] Services:     Active Member of Clubs or Organizations:     Attends Club or Organization Meetings:     Marital Status:    Intimate Partner Violence:     Fear of Current or Ex-Partner:     Emotionally Abused:     Physically Abused:     Sexually Abused:        PHYSICAL EXAM     INITIAL VITALS:  height is 6' 1\" (1.854 m) and weight is 190 lb (86.2 kg). His temperature is 99.9 °F (37.7 °C). His blood pressure is 136/89 and his pulse is 95. His respiration is 20 and oxygen saturation is 94%. Physical Exam  Constitutional:       Appearance: Normal appearance. He is well-developed. He is not ill-appearing. HENT:      Head: Normocephalic and atraumatic. Nose: Nose normal.      Mouth/Throat:      Mouth: Mucous membranes are moist.      Pharynx: Oropharynx is clear. Eyes:      Conjunctiva/sclera: Conjunctivae normal.   Cardiovascular:      Rate and Rhythm: Normal rate. Pulses: Normal pulses. Pulmonary:      Effort: Pulmonary effort is normal.   Abdominal:      Palpations: Abdomen is soft. Musculoskeletal:         General: Tenderness present. No swelling, deformity or signs of injury. Normal range of motion. Cervical back: Normal range of motion. Skin:     General: Skin is warm and dry. Capillary Refill: Capillary refill takes less than 2 seconds. Neurological:      General: No focal deficit present. Mental Status: He is alert and oriented to person, place, and time. Psychiatric:         Behavior: Behavior normal.         DIFFERENTIAL DIAGNOSIS:   Chronic back pain, sciatica, lumbar radiculopathy. DIAGNOSTIC RESULTS     EKG: All EKG's are interpreted by the Emergency Department Physician who eithersigns or Co-signs this chart in the absence of a cardiologist.        RADIOLOGY: non-plainfilm images(s) such as CT, Ultrasound and MRI are read by the radiologist.  Plain radiographic images are visualized and preliminarily interpreted by the emergency physician unless otherwise stated below. No orders to display         LABS:   Labs Reviewed - No data to display    EMERGENCY DEPARTMENT COURSE:   Vitals:    Vitals:    08/25/21 2210 08/25/21 2212   BP:  136/89   Pulse: 95    Resp: 20    Temp: 99.9 °F (37.7 °C)    SpO2: 94%    Weight: 190 lb (86.2 kg)    Height: 6' 1\" (1.854 m)           [facility]                         MDM    Patient was seen in the ER for an exacerbation of chronic back pain/lumbar radiculopathy. He is given toradol, solumedrol, and flexeril with improvement. No red flag symptoms. Patient is comfortable with discharge home with follow up. Medications   cyclobenzaprine (FLEXERIL) tablet 10 mg (10 mg Oral Given 8/25/21 2343)   methylPREDNISolone sodium (SOLU-MEDROL) injection 125 mg (125 mg IntraMUSCular Given 8/25/21 2344)   ketorolac (TORADOL) injection 30 mg (30 mg IntraMUSCular Given 8/25/21 2344)         Patient was seen independently by myself. The patient's final impression and disposition and plan was determined by myself. Strict return precautions and follow up instructions were discussed with the patient prior to discharge, with which the patient agrees. Physical assessment findings, diagnostic testing(s) if applicable, and vital signs reviewed with patient/patient representative. Questions answered.    Medications asdirected, including OTC medications for supportive

## 2021-09-16 ENCOUNTER — OFFICE VISIT (OUTPATIENT)
Dept: PHYSICAL MEDICINE AND REHAB | Age: 61
End: 2021-09-16
Payer: MEDICAID

## 2021-09-16 VITALS
DIASTOLIC BLOOD PRESSURE: 80 MMHG | BODY MASS INDEX: 25.18 KG/M2 | SYSTOLIC BLOOD PRESSURE: 116 MMHG | HEIGHT: 73 IN | WEIGHT: 190 LBS

## 2021-09-16 DIAGNOSIS — M25.511 ACUTE PAIN OF RIGHT SHOULDER: Primary | ICD-10-CM

## 2021-09-16 DIAGNOSIS — G89.4 CHRONIC PAIN SYNDROME: ICD-10-CM

## 2021-09-16 DIAGNOSIS — M79.601 RIGHT ARM PAIN: ICD-10-CM

## 2021-09-16 DIAGNOSIS — M48.061 SPINAL STENOSIS OF LUMBAR REGION WITHOUT NEUROGENIC CLAUDICATION: ICD-10-CM

## 2021-09-16 DIAGNOSIS — M54.17 LUMBOSACRAL RADICULITIS: ICD-10-CM

## 2021-09-16 DIAGNOSIS — F11.90 CHRONIC, CONTINUOUS USE OF OPIOIDS: ICD-10-CM

## 2021-09-16 DIAGNOSIS — M79.2 NERVE PAIN: ICD-10-CM

## 2021-09-16 DIAGNOSIS — M47.816 LUMBAR SPONDYLOSIS: ICD-10-CM

## 2021-09-16 DIAGNOSIS — G62.9 NEUROPATHY: ICD-10-CM

## 2021-09-16 DIAGNOSIS — M79.604 RIGHT LEG PAIN: ICD-10-CM

## 2021-09-16 PROCEDURE — 99214 OFFICE O/P EST MOD 30 MIN: CPT | Performed by: NURSE PRACTITIONER

## 2021-09-16 RX ORDER — GABAPENTIN 300 MG/1
CAPSULE ORAL
Qty: 60 CAPSULE | Refills: 0 | Status: SHIPPED | OUTPATIENT
Start: 2021-09-16 | End: 2021-10-15

## 2021-09-16 RX ORDER — HYDROCODONE BITARTRATE AND ACETAMINOPHEN 5; 325 MG/1; MG/1
1 TABLET ORAL EVERY 6 HOURS PRN
Qty: 120 TABLET | Refills: 0 | Status: SHIPPED | OUTPATIENT
Start: 2021-09-17 | End: 2021-10-15

## 2021-09-16 ASSESSMENT — ENCOUNTER SYMPTOMS: BACK PAIN: 1

## 2021-09-16 NOTE — LETTER
[address] [place] [address] [address]  Phone: [phone]  Fax: [phone], APRN - CNP        September 16, 2021     Patient: Rama Jiménez   YOB: 1960   Date of Visit: 9/16/2021       To Whom It May Concern:     Yamile Lynn was seen by me in the office today. If you have any questions or concerns, please don't hesitate to call.     Sincerely,        Abena Wills, FRANK - CNP

## 2021-09-16 NOTE — PROGRESS NOTES
[address] [facility] [place] [address]  Dept: [phone]  Dept Fax: [phone]: [phone]    Visit Date: 9/16/2021    Functionality Assessment/Goals Worksheet     On a scale of 0 (Does not Interfere) to 10 (Completely Interferes)     1. Which number describes how during the past week pain has interfered with       the following:  A. General Activity:  9  B. Mood: 9  C. Walking Ability:  10  D. Normal Work (Includes both work outside the home and housework):  9  E. Relations with Other People:   8  F. Sleep:   9  G. Enjoyment of Life:   9    2. Patient Prefers to Take their Pain Medications:     [x]  On a regular basis   [x]  Only when necessary    []  Does not take pain medications    3. What are the Patient's Goals/Expectations for Visiting Pain Management? [x]  Learn about my pain    []  Receive Medication   []  Physical Therapy     []  Treat Depression   []  Receive Injections    []  Treat Sleep   []  Deal with Anxiety and Stress   []  Treat Opoid Dependence/Addiction   []  Other:      HPI:   Cata Morrow is a 64 y.o. male is here today for    Chief Complaint: Right leg pain, right shoulder and arm pain     HPI   Fu from Right LESI # 2 from 6/22/2021. Received 75% relief of right leg pain for 1-2 weeks then pain came pain. Continues to have pain in right leg from right side hip radiates down lateral and anterior leg to about right knee- sharp, pain with tingling     Has a new complaint of pain in right shoulder- aching, stabbing pain and numbness down right arm from shoulder to wrist also sharp pain. Intermittent   Pain increases with bending, lifting, reaching, pushing, pulling, standing, raising arms, getting up and down and laying. Continues Norco and Neurontin which are effective   Medications reviewed. Patient denies side effects with medications.  Patient states he is taking medications as prescribed. Hedenies receiving pain medications from other sources. He had 1 ER visit for poison ivy     Pain scale with out pain medications or at its worst is 8-10/10. Pain scale with pain medications or at its best is 0/10. Last dose of Norco and neurontin was Tuesday- 2 days ago   Drug screen reviewed from 3/16/2021 and was appropriate per note  Pill count was completed today and out of medications 2 days early- educated   Patient does not have naloxone available at home. Patient has not required use of naloxone at home since last office visit. The patienthas No Known Allergies. Subjective:      Review of Systems   Constitutional: Negative. Musculoskeletal: Positive for arthralgias, back pain, gait problem, joint swelling, myalgias, neck pain and neck stiffness. Ambulating with strait cane    Neurological: Positive for weakness and numbness. Psychiatric/Behavioral: Negative. Objective:     Vitals:    09/16/21 1049   BP: 116/80   Weight: 190 lb (86.2 kg)   Height: 6' 1\" (1.854 m)       Physical Exam  Vitals and nursing note reviewed. Constitutional:       General: He is not in acute distress. Appearance: He is well-developed. He is not diaphoretic. HENT:      Head: Normocephalic and atraumatic. Right Ear: External ear normal.      Left Ear: External ear normal.      Nose: Nose normal.      Mouth/Throat:      Pharynx: No oropharyngeal exudate. Eyes:      General: No scleral icterus. Right eye: No discharge. Left eye: No discharge. Conjunctiva/sclera: Conjunctivae normal.      Pupils: Pupils are equal, round, and reactive to light. Neck:      Thyroid: No thyromegaly. Cardiovascular:      Rate and Rhythm: Normal rate and regular rhythm. Heart sounds: Normal heart sounds. No murmur heard. No friction rub. No gallop. Pulmonary:      Effort: Pulmonary effort is normal. No respiratory distress.       Breath sounds: Examination of the right-upper field reveals wheezing. Examination of the left-upper field reveals wheezing. Wheezing present. No rales. Chest:      Chest wall: No tenderness. Abdominal:      General: Bowel sounds are normal. There is no distension. Palpations: Abdomen is soft. Tenderness: There is no abdominal tenderness. There is no guarding or rebound. Musculoskeletal:         General: Swelling and tenderness present. Right shoulder: Tenderness and bony tenderness present. Normal range of motion. Decreased strength. Left shoulder: No tenderness. Normal range of motion. Right upper arm: Tenderness and bony tenderness present. Right elbow: Tenderness present. Right forearm: Tenderness present. Right wrist: Tenderness present. Right hand: Tenderness present. Decreased sensation. Cervical back: Full passive range of motion without pain, normal range of motion and neck supple. Spasms, tenderness and bony tenderness present. No edema, erythema or rigidity. No muscular tenderness. Normal range of motion. Thoracic back: No tenderness. Lumbar back: Spasms, tenderness and bony tenderness present. Decreased range of motion. Back:       Right hip: Tenderness and bony tenderness present. Decreased range of motion. Decreased strength. Left hip: No tenderness. Right upper leg: Tenderness present. Right knee: Normal range of motion. Tenderness present. Left knee: Normal range of motion. No tenderness. Right lower leg: No swelling. No edema. Left lower leg: No swelling. No edema. Right ankle: No swelling. Left ankle: No swelling. Legs:    Skin:     General: Skin is warm. Coloration: Skin is not pale. Findings: No erythema or rash. Neurological:      General: No focal deficit present. Mental Status: He is alert and oriented to person, place, and time. He is not disoriented.       Cranial Nerves: No cranial nerve deficit. Sensory: Sensory deficit present. Motor: Weakness present. No atrophy or abnormal muscle tone. Coordination: Romberg sign positive. Coordination normal.      Gait: Gait abnormal.      Deep Tendon Reflexes: Babinski sign absent on the right side. Babinski sign absent on the left side. Reflex Scores:       Tricep reflexes are 2+ on the right side and 2+ on the left side. Bicep reflexes are 2+ on the right side and 2+ on the left side. Brachioradialis reflexes are 2+ on the right side and 2+ on the left side. Patellar reflexes are 2+ on the right side and 2+ on the left side. Achilles reflexes are 1+ on the right side and 2+ on the left side. Comments: 4/5 strength right upper extremity     Right lower extremity decraesed sensation in thigh   Right upper extremity numbness     Psychiatric:         Attention and Perception: Attention normal. He is attentive. Mood and Affect: Mood normal. Mood is not anxious or depressed. Affect is not labile, blunt, angry or inappropriate. Speech: Speech normal. He is communicative. Speech is not rapid and pressured, delayed, slurred or tangential.         Behavior: Behavior normal. Behavior is not agitated, slowed, aggressive, withdrawn, hyperactive or combative. Behavior is cooperative. Thought Content: Thought content normal. Thought content is not paranoid or delusional. Thought content does not include homicidal or suicidal ideation. Thought content does not include homicidal or suicidal plan. Cognition and Memory: Cognition normal. Memory is not impaired. He does not exhibit impaired recent memory or impaired remote memory. Judgment: Judgment is not impulsive or inappropriate. IGNACIA test: + right side  Yeomans test: + right side   Gaenslen test: + right side      Assessment:     1. Acute pain of right shoulder    2. Right arm pain    3. Nerve pain    4. Lumbosacral radiculitis    5. Spinal stenosis of lumbar region without neurogenic claudication    6. Lumbar spondylosis    7. Right leg pain    8. Chronic pain syndrome    9. Neuropathy    10. Chronic, continuous use of opioids            Plan:      OARRS reviewed. Current MED: 20.00  Patient was offered naloxone for home. Discussed long term side effects of medications, tolerance, dependency and addiction. Previous UDS reviewed  UDS preformed today for compliance. Patient told can not receive any pain medications from any other source. No evidence of abuse, diversion or aberrant behavior. Medications and/or procedures to improve function and quality of life- patient understanding with this and that may not be pain free  Discussed with patient about safe storage of medications at home  Discussed possible weaning of medication dosing dependent on treatment/procedure results. Discussed with patient about risks with procedure including infection, reaction to medication, increased pain, or bleeding. Procedure notes reveiwed in detail  Only short term relief from right LESI X 2, no relief from TFLES  Discussed possible neurosurgery referral for SCS in future. He wants to wait. Ordered right shoulder xray d/t pain   Neurology referral for right upper extremity EMG  Updated UDS ordered today. Continue  Norco 5 prn q 6 hrs prn- ordered refill   Continue Neurontin- 300 mg BID- ordered refill         Meds. Prescribed:   Orders Placed This Encounter   Medications    gabapentin (NEURONTIN) 300 MG capsule     Sig: take 1 capsule by mouth twice a day     Dispense:  60 capsule     Refill:  0    HYDROcodone-acetaminophen (NORCO) 5-325 MG per tablet     Sig: Take 1 tablet by mouth every 6 hours as needed for Pain for up to 30 days. Dispense:  120 tablet     Refill:  0     Reduce doses taken as pain becomes manageable       Return for FU after shoulder xray and EMG .                Electronically signed by Keila Hawthorne FRANK James - CNP on9/16/2021 at 12:01 PM

## 2021-09-22 ENCOUNTER — HOSPITAL ENCOUNTER (OUTPATIENT)
Age: 61
Discharge: HOME | End: 2021-09-22
Payer: OTHER GOVERNMENT

## 2021-09-22 ENCOUNTER — HOSPITAL ENCOUNTER (OUTPATIENT)
Dept: GENERAL RADIOLOGY | Age: 61
Discharge: HOME | End: 2021-09-22
Payer: OTHER GOVERNMENT

## 2021-09-22 DIAGNOSIS — M25.511 ACUTE PAIN OF RIGHT SHOULDER: ICD-10-CM

## 2021-09-22 PROCEDURE — 73030 X-RAY EXAM OF SHOULDER: CPT

## 2021-09-24 ENCOUNTER — HOSPITAL ENCOUNTER (EMERGENCY)
Age: 61
Discharge: HOME | End: 2021-09-24
Attending: EMERGENCY MEDICINE
Payer: MEDICARE

## 2021-09-24 VITALS
OXYGEN SATURATION: 98 % | DIASTOLIC BLOOD PRESSURE: 97 MMHG | HEART RATE: 87 BPM | TEMPERATURE: 97.4 F | RESPIRATION RATE: 16 BRPM | SYSTOLIC BLOOD PRESSURE: 164 MMHG

## 2021-09-24 DIAGNOSIS — M54.31 SCIATICA OF RIGHT SIDE: Primary | ICD-10-CM

## 2021-09-24 PROCEDURE — 99283 EMERGENCY DEPT VISIT LOW MDM: CPT

## 2021-09-24 PROCEDURE — 96372 THER/PROPH/DIAG INJ SC/IM: CPT

## 2021-09-24 PROCEDURE — 6360000002 HC RX W HCPCS: Performed by: EMERGENCY MEDICINE

## 2021-09-24 RX ORDER — METHYLPREDNISOLONE ACETATE 80 MG/ML
80 INJECTION, SUSPENSION INTRA-ARTICULAR; INTRALESIONAL; INTRAMUSCULAR; SOFT TISSUE ONCE
Status: COMPLETED | OUTPATIENT
Start: 2021-09-24 | End: 2021-09-24

## 2021-09-24 RX ORDER — METHYLPREDNISOLONE 4 MG/1
TABLET ORAL
Qty: 1 KIT | Refills: 0 | Status: SHIPPED | OUTPATIENT
Start: 2021-09-24 | End: 2021-09-30

## 2021-09-24 RX ORDER — KETOROLAC TROMETHAMINE 30 MG/ML
30 INJECTION, SOLUTION INTRAMUSCULAR; INTRAVENOUS ONCE
Status: COMPLETED | OUTPATIENT
Start: 2021-09-24 | End: 2021-09-24

## 2021-09-24 RX ADMIN — METHYLPREDNISOLONE ACETATE 80 MG: 80 INJECTION, SUSPENSION INTRA-ARTICULAR; INTRALESIONAL; INTRAMUSCULAR; SOFT TISSUE at 03:10

## 2021-09-24 RX ADMIN — KETOROLAC TROMETHAMINE 30 MG: 30 INJECTION, SOLUTION INTRAMUSCULAR; INTRAVENOUS at 03:09

## 2021-09-24 ASSESSMENT — ENCOUNTER SYMPTOMS
RHINORRHEA: 0
BACK PAIN: 1
ABDOMINAL DISTENTION: 0
CONSTIPATION: 0
DIARRHEA: 0
ABDOMINAL PAIN: 0
PHOTOPHOBIA: 0
EYE DISCHARGE: 0
COUGH: 0
EYE PAIN: 0
VOMITING: 0
SHORTNESS OF BREATH: 0
EYE REDNESS: 0
WHEEZING: 0
NAUSEA: 0
STRIDOR: 0
EYE ITCHING: 0
SORE THROAT: 0
CHEST TIGHTNESS: 0

## 2021-09-24 ASSESSMENT — PAIN SCALES - GENERAL: PAINLEVEL_OUTOF10: 10

## 2021-09-24 NOTE — ED TRIAGE NOTES
Patient arrives to the ED with c/o right leg pain, pt says he has a pinched nerve and comes to the ED for pain medicine and steroids. Pt says he is supposed to have surgery later this year. GCS 15.

## 2021-09-24 NOTE — ED PROVIDER NOTES
stiffness. Skin: Negative for pallor, rash and wound. Allergic/Immunologic: Negative for environmental allergies and food allergies. Neurological: Negative for dizziness, tremors, syncope, weakness and headaches. Psychiatric/Behavioral: Negative for agitation, behavioral problems, confusion, self-injury, sleep disturbance and suicidal ideas. PAST MEDICAL HISTORY     Past Medical History:   Diagnosis Date    B[facility] 1 disorder ([facility] [address]     COPD (chronic obstructive pulmonary disease) ([facility] [address]     Hypertension     Lumbar radiculopathy        SURGICAL HISTORY       Past Surgical History:   Procedure Laterality Date    COLONOSCOPY Left 6/21/2019    COLONOSCOPY DIAGNOSTIC performed by [facility]ka Smiley MD at [facility] Right     HIP SURGERY Right 3/15/2021    right hip steroid injection no sedation performed by Maximus Manning MD at [place] [address] Right 2/8/2021    RIGHT TFLESI @ L4 and L5. performed by Maximus Manning MD at [place] [address] Right 4/20/2021    LESI @L4#1 right performed by Maximus Manning MD at [place] [address] Right 6/22/2021    right LESI # 2 @ L3 performed by Maximus Manning MD at [facility] Left 6/21/2019    EGD BIOPSY performed by Rebeka Smiley MD at [address] [facility]       Previous Medications    CALCIUM POLYCARBOPHIL (FIBER) 625 MG TABS    Take 1 tablet by mouth daily    FLUOXETINE (PROZAC) 20 MG CAPSULE    Take 20 mg by mouth daily. GABAPENTIN (NEURONTIN) 300 MG CAPSULE    take 1 capsule by mouth twice a day    HYDROCODONE-ACETAMINOPHEN (NORCO) 5-325 MG PER TABLET    Take 1 tablet by mouth every 6 hours as needed for Pain for up to 30 days.     IBUPROFEN (ADVIL;MOTRIN) 800 MG TABLET    Take 1 tablet by mouth every 8 hours as needed for Pain    LACTASE (LACTAID) 3000 UNITS TABLET    Take 1 tablet by mouth 3 times daily (with meals)    METOPROLOL (TOPROL-XL) 50 MG XL TABLET    Take 50 mg by mouth daily. ALLERGIES     Patient has no known allergies. FAMILY HISTORY     He indicated that his mother is alive. He indicated that his father is . family history includes Diabetes in his father and mother. SOCIAL HISTORY      reports that he has been smoking cigarettes. He started smoking about 38 years ago. He has a 7.50 pack-year smoking history. He has never used smokeless tobacco. He reports previous alcohol use. He reports that he does not use drugs. PHYSICAL EXAM      temperature is 97.4 °F (36.3 °C). His blood pressure is 164/97 (abnormal) and his pulse is 87. His respiration is 16 and oxygen saturation is 98%. Physical Exam  Vitals and nursing note reviewed. Constitutional:       Appearance: He is well-developed. He is not diaphoretic. HENT:      Head: Normocephalic and atraumatic. Nose: Nose normal.   Eyes:      General: No scleral icterus. Right eye: No discharge. Left eye: No discharge. Conjunctiva/sclera: Conjunctivae normal.      Pupils: Pupils are equal, round, and reactive to light. Neck:      Vascular: No JVD. Trachea: No tracheal deviation. Cardiovascular:      Rate and Rhythm: Normal rate and regular rhythm. Heart sounds: Normal heart sounds. No murmur heard. No friction rub. No gallop. Pulmonary:      Effort: Pulmonary effort is normal. No respiratory distress. Breath sounds: Normal breath sounds. No stridor. No wheezing or rales. Chest:      Chest wall: No tenderness. Abdominal:      General: Bowel sounds are normal. There is no distension. Palpations: Abdomen is soft. There is no mass. Tenderness: There is no abdominal tenderness. There is no guarding or rebound. Hernia: No hernia is present.    Musculoskeletal:         General: Tenderness Tylenol for pain. CRITICAL CARE   None    CONSULTS   None    PROCEDURES   None    FINAL IMPRESSION AND DISPOSITION      1. Sciatica of right side        Discharge home    PATIENT REFERRED TO:  Promise Torres MD  [address] [place] [address] [facility]  [phone]      As scheduled      DISCHARGE MEDICATIONS:  New Prescriptions    METHYLPREDNISOLONE (MEDROL, MARILY,) 4 MG TABLET    Take by mouth.        (Please note that portions of this note were completed with a voice recognition program.  Efforts were made to edit the dictations but occasionally words aremis-transcribed.)    MD Wilton Valdez MD  09/24/21 8915

## 2021-09-27 ENCOUNTER — TELEPHONE (OUTPATIENT)
Dept: UROLOGY | Age: 61
End: 2021-09-27

## 2021-09-27 ENCOUNTER — OFFICE VISIT (OUTPATIENT)
Dept: UROLOGY | Age: 61
End: 2021-09-27
Payer: OTHER GOVERNMENT

## 2021-09-27 VITALS
WEIGHT: 187.4 LBS | TEMPERATURE: 97.3 F | HEIGHT: 73 IN | SYSTOLIC BLOOD PRESSURE: 135 MMHG | DIASTOLIC BLOOD PRESSURE: 82 MMHG | HEART RATE: 97 BPM | RESPIRATION RATE: 18 BRPM | BODY MASS INDEX: 24.84 KG/M2

## 2021-09-27 DIAGNOSIS — N40.1 BENIGN LOCALIZED PROSTATIC HYPERPLASIA WITH LOWER URINARY TRACT SYMPTOMS (LUTS): Primary | ICD-10-CM

## 2021-09-27 LAB
BILIRUBIN URINE: NEGATIVE
BLOOD URINE, POC: NEGATIVE
CHARACTER, URINE: CLEAR
COLOR, URINE: YELLOW
GLUCOSE URINE: NEGATIVE MG/DL
KETONES, URINE: NEGATIVE
LEUKOCYTE CLUMPS, URINE: NEGATIVE
NITRITE, URINE: NEGATIVE
PH, URINE: 6.5 (ref 5–9)
POST VOID RESIDUAL (PVR): 132 ML
PROTEIN, URINE: NEGATIVE MG/DL
SPECIFIC GRAVITY, URINE: 1.02 (ref 1–1.03)
UROBILINOGEN, URINE: 0.2 EU/DL (ref 0–1)

## 2021-09-27 PROCEDURE — 81003 URINALYSIS AUTO W/O SCOPE: CPT | Performed by: UROLOGY

## 2021-09-27 PROCEDURE — 51798 US URINE CAPACITY MEASURE: CPT | Performed by: UROLOGY

## 2021-09-27 PROCEDURE — 99214 OFFICE O/P EST MOD 30 MIN: CPT | Performed by: UROLOGY

## 2021-09-27 RX ORDER — TAMSULOSIN HYDROCHLORIDE 0.4 MG/1
0.4 CAPSULE ORAL DAILY
Qty: 21 CAPSULE | Refills: 6 | Status: SHIPPED | OUTPATIENT
Start: 2021-09-27 | End: 2022-06-10 | Stop reason: SDUPTHER

## 2021-09-27 NOTE — PROGRESS NOTES
Dr. Antionette Ormond, MD MD [facility] Consultation / New Patient Visit    Patient:  Cally Mendiola  YOB: 1960  Date: 9/27/2021  Consult requested from Caitlin Morocho DO     HISTORY OF PRESENT ILLNESS:   The patient is a 64 y.o. male who presents today for follow-up for the following problem(s): ED  Overall the problem(s) : show no change. Associated Symptoms: No dysuria, gross hematuria. Pain Severity:      Today visit:   9/27/21  ED chornic problem, stable on Trimix. Discussed risks and benefits. Would like to continue the medication. BPH with LUTs - chronic unstable problem with worsening frequency urgency and weak stream.  On Tamsulosin, does not like taking it cause it causes decreased libido and retrograde ejaculation. Discussed risks and benefits. Summary of old records:   (Patient's old records, notes and chart reviewed and summarized above.)  Cally Mendiola f/u today for Erectile Dysfunction. Previous patient of Dr. Teddy Mitchell. He remains on Trimix injections which are working well for him, however he is interested in vascular reconstructive surgery for his ED. Unable to achieve erection without Trimix injection. He comes in today by himself. Hx is obtained from the patient and medical record.      Last several PSA's:  Lab Results   Component Value Date    PSA 1.08 (H) 11/14/2018    PSA 1.36 04/18/2018    PSA 1.47 10/17/2017       Last total testosterone:  No results found for: TESTOSTERONE    Urinalysis today:  Results for POC orders placed in visit on 09/27/21   POCT Urinalysis No Micro (Auto)   Result Value Ref Range    Glucose, Ur Negative NEGATIVE mg/dl    Bilirubin Urine Negative     Ketones, Urine Negative NEGATIVE    Specific Gravity, Urine 1.020 1.002 - 1.030    Blood, UA POC Negative NEGATIVE    pH, Urine 6.50 5.0 - 9.0    Protein, Urine Negative NEGATIVE mg/dl    Urobilinogen, Urine 0.20 0.0 - 1.0 eu/dl    Nitrite, Urine Negative NEGATIVE    Leukocyte Clumps, Urine Negative NEGATIVE    Color, Urine Yellow YELLOW-STRAW    Character, Urine Clear [id number]         Last BUN and creatinine:  Lab Results   Component Value Date    BUN 14 11/18/2018     Lab Results   Component Value Date    CREATININE 1.1 11/18/2018       Imaging Reviewed during this Office Visit:   (results were independently reviewed by physician and radiology report verified)    PAST MEDICAL, FAMILY AND SOCIAL HISTORY:  Past Medical History:   Diagnosis Date    [facility]     COPD (chronic obstructive pulmonary disease) ([facility]     Hypertension     Lumbar radiculopathy      Past Surgical History:   Procedure Laterality Date    COLONOSCOPY Left 6/21/2019    COL[facility]ST[facility] Right     HIP SURGERY Right 3/15/2021    right hip steroid injection no sedation performed by Eliseo Centeno MD at [place] [address] Right 2/8/2021    RIGHT TFLESI @ L4 and L5. performed by Eliseo Centeno MD at [place] [address] Right 4/20/2021    LESI @L4#1 right performed by Eliseo Centeno MD at [place] [address] Right 6/22/2021    right LESI # 2 @ L3 performed by Eliseo Centeno MD at [address] [place] [address] ENDOSCOPY Left 6/21/2019    EGD BIOPSY performed by [facility]ady Campos MD at [facility] Endoscopy     Family History   Problem Relation Age of Onset    Diabetes Mother     Diabetes Father      Outpatient Medications Marked as Taking for the 9/27/21 encounter (Office Visit) with Daniel Blackburn MD   Medication Sig Dispense Refill    methylPREDNISolone (MEDROL, MARILY,) 4 MG tablet Take by mouth.  1 kit 0    gabapentin (NEURONTIN) 300 MG capsule take 1 capsule by mouth twice a day 60 capsule 0    HYDROcodone-acetaminophen (NORCO) 5-325 MG per tablet Take 1 tablet by mouth every 6 hours as needed for Pain for up to 30 days. 120 tablet 0    ibuprofen (ADVIL;MOTRIN) 800 MG tablet Take 1 tablet by mouth every 8 hours as needed for Pain 30 tablet 0    lactase (LACTAID) 3000 units tablet Take 1 tablet by mouth 3 times daily (with meals) 30 tablet 0    Calcium Polycarbophil (FIBER) 625 MG TABS Take 1 tablet by mouth daily 14 tablet 0    FLUoxetine (PROZAC) 20 MG capsule Take 20 mg by mouth daily.  metoprolol (TOPROL-XL) 50 MG XL tablet Take 50 mg by mouth daily. Patient has no known allergies. Social History     Tobacco Use   Smoking Status Current Every Day Smoker    Packs/day: 0.25    Years: 30.00    Pack years: 7.50    Types: Cigarettes    Start date: 4/27/1983   Smokeless Tobacco Never Used       Social History     Substance and Sexual Activity   Alcohol Use Not Currently    Comment: occasionally       REVIEW OF SYSTEMS:  Constitutional: negative  Eyes: negative  Respiratory: negative  Cardiovascular: negative  Gastrointestinal: negative  Musculoskeletal: negative  Genitourinary: negative  Skin: negative   Neurological: negative  Hematological/Lymphatic: negative  Psychological: negative    Physical Exam:    This a 64 y.o. male   Vitals:    09/27/21 1456   BP: 135/82   Pulse: 97   Resp: 18   Temp: 97.3 °F (36.3 °C)     Constitutional: Patient in no acute distress   Neuro: alert and oriented to person place and time. Psych: Mood and affect normal.  Head: atraumatic normocephalic  Eyes: EOMi  HEENT: neck supple, trachea midline  Lungs: Respiratory effort normal  Cardiovascular:  Normal peripheral pulses  Abdomen: Soft, non-tender, non-distended, No CVA  Bladder: non-tender and not distended. FROMx4, no cyanosis clubbing edema  Skin: warm and dry      Assessment and Plan      1. Benign localized prostatic hyperplasia with lower urinary tract symptoms (LUTS)           Plan:      No follow-ups on file.   Refill Trimix for ED - Follow up 1 year for maintenance  Flomax - will start, monitor symptoms. Follow up 3 months for med check with [facility].   - offered cystoscopy for BPH, will consider in future

## 2021-10-05 ENCOUNTER — PROCEDURE VISIT (OUTPATIENT)
Dept: NEUROLOGY | Age: 61
End: 2021-10-05
Payer: MEDICARE

## 2021-10-05 DIAGNOSIS — M54.2 NECK PAIN: ICD-10-CM

## 2021-10-05 DIAGNOSIS — R20.0 RIGHT ARM NUMBNESS: Primary | ICD-10-CM

## 2021-10-05 DIAGNOSIS — G56.01 RIGHT CARPAL TUNNEL SYNDROME: ICD-10-CM

## 2021-10-05 DIAGNOSIS — M54.12 CERVICAL RADICULOPATHY: ICD-10-CM

## 2021-10-05 PROCEDURE — 95909 NRV CNDJ TST 5-6 STUDIES: CPT | Performed by: PSYCHIATRY & NEUROLOGY

## 2021-10-05 PROCEDURE — 95886 MUSC TEST DONE W/N TEST COMP: CPT | Performed by: PSYCHIATRY & NEUROLOGY

## 2021-10-12 ENCOUNTER — HOSPITAL ENCOUNTER (OUTPATIENT)
Dept: CT IMAGING | Age: 61
Discharge: HOME | End: 2021-10-12
Payer: OTHER GOVERNMENT

## 2021-10-12 DIAGNOSIS — Z72.0 TOBACCO ABUSE: ICD-10-CM

## 2021-10-12 PROCEDURE — 71250 CT THORAX DX C-: CPT

## 2021-10-15 DIAGNOSIS — F11.90 CHRONIC, CONTINUOUS USE OF OPIOIDS: ICD-10-CM

## 2021-10-15 DIAGNOSIS — M54.17 LUMBOSACRAL RADICULITIS: ICD-10-CM

## 2021-10-15 DIAGNOSIS — M47.816 LUMBAR SPONDYLOSIS: ICD-10-CM

## 2021-10-15 DIAGNOSIS — G89.4 CHRONIC PAIN SYNDROME: ICD-10-CM

## 2021-10-15 DIAGNOSIS — M48.061 SPINAL STENOSIS OF LUMBAR REGION WITHOUT NEUROGENIC CLAUDICATION: ICD-10-CM

## 2021-10-15 DIAGNOSIS — M79.604 RIGHT LEG PAIN: ICD-10-CM

## 2021-10-15 RX ORDER — GABAPENTIN 300 MG/1
CAPSULE ORAL
Qty: 60 CAPSULE | Refills: 0 | Status: SHIPPED | OUTPATIENT
Start: 2021-10-16 | End: 2021-11-12 | Stop reason: SDUPTHER

## 2021-10-15 RX ORDER — HYDROCODONE BITARTRATE AND ACETAMINOPHEN 5; 325 MG/1; MG/1
1 TABLET ORAL EVERY 6 HOURS PRN
Qty: 120 TABLET | Refills: 0 | Status: SHIPPED | OUTPATIENT
Start: 2021-10-17 | End: 2021-11-12 | Stop reason: SDUPTHER

## 2021-10-21 LAB
ALBUMIN SERPL-MCNC: NORMAL G/DL
ALP BLD-CCNC: NORMAL U/L
ALT SERPL-CCNC: 17 U/L
ANION GAP SERPL CALCULATED.3IONS-SCNC: NORMAL MMOL/L
AST SERPL-CCNC: 21 U/L
AVERAGE GLUCOSE: NORMAL
BILIRUB SERPL-MCNC: 0.3 MG/DL (ref 0.1–1.4)
BUN BLDV-MCNC: NORMAL MG/DL
CALCIUM SERPL-MCNC: NORMAL MG/DL
CHLORIDE BLD-SCNC: NORMAL MMOL/L
CHOLESTEROL, TOTAL: 148 MG/DL
CHOLESTEROL/HDL RATIO: NORMAL
CO2: 26 MMOL/L
CREAT SERPL-MCNC: 0.9 MG/DL
GFR CALCULATED: NORMAL
GLUCOSE BLD-MCNC: 96 MG/DL
HBA1C MFR BLD: 5.6 %
HCT VFR BLD CALC: 38 % (ref 41–53)
HDLC SERPL-MCNC: 49 MG/DL (ref 35–70)
HEMOGLOBIN: 13.1 G/DL (ref 13.5–17.5)
LDL CHOLESTEROL CALCULATED: 73 MG/DL (ref 0–160)
NONHDLC SERPL-MCNC: NORMAL MG/DL
PLATELET # BLD: 238 K/ΜL
POTASSIUM SERPL-SCNC: 4.2 MMOL/L
PROSTATE SPECIFIC ANTIGEN: 1.24 NG/ML
SODIUM BLD-SCNC: 140 MMOL/L
TOTAL PROTEIN: NORMAL
TRIGL SERPL-MCNC: 128 MG/DL
VLDLC SERPL CALC-MCNC: NORMAL MG/DL
WBC # BLD: 5.9 10^3/ML

## 2021-10-26 ENCOUNTER — APPOINTMENT (OUTPATIENT)
Dept: GENERAL RADIOLOGY | Age: 61
End: 2021-10-26
Payer: MEDICARE

## 2021-10-26 ENCOUNTER — HOSPITAL ENCOUNTER (EMERGENCY)
Age: 61
Discharge: HOME | End: 2021-10-26
Attending: EMERGENCY MEDICINE
Payer: MEDICARE

## 2021-10-26 VITALS
SYSTOLIC BLOOD PRESSURE: 148 MMHG | RESPIRATION RATE: 19 BRPM | HEART RATE: 72 BPM | DIASTOLIC BLOOD PRESSURE: 88 MMHG | TEMPERATURE: 97.9 F | HEIGHT: 74 IN | BODY MASS INDEX: 24.77 KG/M2 | WEIGHT: 193 LBS | OXYGEN SATURATION: 98 %

## 2021-10-26 DIAGNOSIS — S92.302A CLOSED NONDISPLACED FRACTURE OF METATARSAL BONE OF LEFT FOOT, UNSPECIFIED METATARSAL, INITIAL ENCOUNTER: Primary | ICD-10-CM

## 2021-10-26 PROCEDURE — 99282 EMERGENCY DEPT VISIT SF MDM: CPT

## 2021-10-26 PROCEDURE — 73630 X-RAY EXAM OF FOOT: CPT

## 2021-10-26 PROCEDURE — 6360000002 HC RX W HCPCS: Performed by: STUDENT IN AN ORGANIZED HEALTH CARE EDUCATION/TRAINING PROGRAM

## 2021-10-26 PROCEDURE — 96372 THER/PROPH/DIAG INJ SC/IM: CPT

## 2021-10-26 RX ORDER — KETOROLAC TROMETHAMINE 30 MG/ML
30 INJECTION, SOLUTION INTRAMUSCULAR; INTRAVENOUS ONCE
Status: COMPLETED | OUTPATIENT
Start: 2021-10-26 | End: 2021-10-26

## 2021-10-26 RX ADMIN — KETOROLAC TROMETHAMINE 30 MG: 30 INJECTION, SOLUTION INTRAMUSCULAR; INTRAVENOUS at 04:41

## 2021-10-26 ASSESSMENT — ENCOUNTER SYMPTOMS
NAUSEA: 0
SINUS PAIN: 0
PHOTOPHOBIA: 0
TROUBLE SWALLOWING: 0
ABDOMINAL DISTENTION: 0
CHEST TIGHTNESS: 0
RHINORRHEA: 0
SHORTNESS OF BREATH: 0
ABDOMINAL PAIN: 0
DIARRHEA: 0
CONSTIPATION: 0

## 2021-10-26 ASSESSMENT — PAIN SCALES - GENERAL: PAINLEVEL_OUTOF10: 8

## 2021-10-26 NOTE — ED PROVIDER NOTES
[place] ENCOUNTER          Pt Name: Genoveva Sosa  MRN: [id number]  [place] 1960  Date of evaluation: 10/26/2021  Treating Resident Physician: Sulma Cobian MD  Supervising Physician: Vincent Figures COMPLAINT       Chief Complaint   Patient presents with    Foot Pain     lt     History obtained from the patient. HISTORY OF PRESENT ILLNESS    HPI  Genoveva Sosa is a 64 y.o. male who presents to the emergency department for evaluation of left foot pain. Patient states that he twisted his ankle on a curb 4 days ago. Ankle has felt fine, but he has had persistent foot pain in the middle top of his foot. States the pain is worse with walking or when wearing a shoe. He has a postop shoe that he has been wearing which gives him some improvement in pain. Patient denies any numbness, weakness. The patient has no other acute complaints at this time. REVIEW OF SYSTEMS   Review of Systems   Constitutional: Negative. HENT: Negative for congestion, rhinorrhea, sinus pain and trouble swallowing. Eyes: Negative for photophobia and visual disturbance. Respiratory: Negative for chest tightness and shortness of breath. Cardiovascular: Negative for chest pain, palpitations and leg swelling. Gastrointestinal: Negative for abdominal distention, abdominal pain, constipation, diarrhea and nausea. Genitourinary: Negative. Musculoskeletal: Positive for gait problem and joint swelling. Neurological: Negative for syncope, weakness and numbness.           PAST MEDICAL AND SURGICAL HISTORY     Past Medical History:   Diagnosis Date    [facility] ([facility]     COPD [facility]chronic obstructive [facility] disease) [facility]     Hypertension     Lumbar radiculopathy      Past Surgical History:   Procedure Laterality Date    COLONOSCOPY Left 6/21/2019    COLONOSCOPY DIAGNOSTIC performed by Martina Jenkins MD at [address] [place] [address] SURGERY Right     HIP SURGERY Right 3/15/2021    right hip steroid injection no sedation performed by Calixto Babin MD at [place] [address] Right 2/8/2021    RIGHT TFLESI @ L4 and L5. performed by Calixto Babin MD at [place] [address] Right 4/20/2021    LESI @L4#1 right performed by Calixto Babin MD at [place] [address] Right 6/22/2021    right LESI # 2 @ L3 performed by Calixto Babin MD at [address] [place] [address] ENDOSCOPY Left 6/21/2019    EGD BIOPSY performed by Darylene Brazier, MD at [address] [place] [address]   No current facility-administered medications for this encounter. Current Outpatient Medications:     HYDROcodone-acetaminophen (NORCO) 5-325 MG per tablet, Take 1 tablet by mouth every 6 hours as needed for Pain for up to 30 days. , Disp: 120 tablet, Rfl: 0    gabapentin (NEURONTIN) 300 MG capsule, take 1 capsule by mouth twice a day, Disp: 60 capsule, Rfl: 0    tamsulosin (FLOMAX) 0.4 MG capsule, Take 1 capsule by mouth daily Take one capsule daily to facilitate passage of ureteral stone, Disp: 21 capsule, Rfl: 6    ibuprofen (ADVIL;MOTRIN) 800 MG tablet, Take 1 tablet by mouth every 8 hours as needed for Pain, Disp: 30 tablet, Rfl: 0    lactase (LACTAID) 3000 units tablet, Take 1 tablet by mouth 3 times daily (with meals), Disp: 30 tablet, Rfl: 0    Calcium Polycarbophil (FIBER) 625 MG TABS, Take 1 tablet by mouth daily, Disp: 14 tablet, Rfl: 0    FLUoxetine (PROZAC) 20 MG capsule, Take 20 mg by mouth daily. , Disp: , Rfl:     metoprolol (TOPROL-XL) 50 MG XL tablet, Take 50 mg by mouth daily. , Disp: , Rfl:       SOCIAL HISTORY     Social History     Social History Narrative    Not on file     Social History     Tobacco Use    Smoking status: Current Every Day Smoker     Packs/day: 0.25     Years: 30.00 Pack years: 7.50     Types: Cigarettes     Start date: 4/27/1983    Smokeless tobacco: Never Used   Vaping Use    Vaping Use: Never used   Substance Use Topics    Alcohol use: Not Currently     Comment: occasionally    Drug use: No     Comment: hx of         ALLERGIES   No Known Allergies      FAMILY HISTORY     Family History   Problem Relation Age of Onset    Diabetes Mother     Diabetes Father          PREVIOUS RECORDS   Previous records reviewed: Medical, past surgical, medications, allergies        PHYSICAL EXAM     ED Triage Vitals [10/26/21 0358]   BP Temp Temp Source Pulse Resp SpO2 Height Weight   (!) 148/88 97.9 °F (36.6 °C) Oral 72 19 98 % 6' 2\" (1.88 m) 193 lb (87.5 kg)     Initial vital signs and nursing assessment reviewed and Hypertensive. Body mass index is 24.78 kg/m². Pulsoximetry is normal per my interpretation. Additional Vital Signs:  Vitals:    10/26/21 0358   BP: (!) 148/88   Pulse: 72   Resp: 19   Temp: 97.9 °F (36.6 °C)   SpO2: 98%       Physical Exam  Constitutional:       Appearance: Normal appearance. HENT:      Head: Normocephalic. Right Ear: External ear normal.      Left Ear: External ear normal.      Nose: Nose normal.      Mouth/Throat:      Mouth: Mucous membranes are moist.      Pharynx: Oropharynx is clear. Eyes:      Extraocular Movements: Extraocular movements intact. Conjunctiva/sclera: Conjunctivae normal.      Pupils: Pupils are equal, round, and reactive to light. Cardiovascular:      Rate and Rhythm: Normal rate and regular rhythm. Pulses: Normal pulses. Pulmonary:      Effort: Pulmonary effort is normal. No respiratory distress. Abdominal:      General: Abdomen is flat. Bowel sounds are normal. There is no distension. Palpations: Abdomen is soft. Tenderness: There is no abdominal tenderness. Musculoskeletal:         General: Swelling, tenderness and signs of injury present. No deformity.       Cervical back: Normal range of motion and neck supple. Right lower leg: No edema. Left lower leg: No edema. Comments: Tenderness to palpation of dorsum of left foot. area of bruising noted over first and second metatarsal   Skin:     General: Skin is warm and dry. Capillary Refill: Capillary refill takes less than 2 seconds. Findings: Bruising present. No rash. Neurological:      General: No focal deficit present. Mental Status: He is alert and oriented to person, place, and time. Sensory: No sensory deficit. Motor: No weakness. MEDICAL DECISION MAKING   Initial Assessment:   1. This is a 40-year-old male, presenting with left foot injury 4 days ago. Physical exam significant for tenderness to palpation of dorsum of left foot. Plan:    X-ray shows hairline fracture with possible callus formation of midshaft second metatarsal   Placed in boot, given Toradol   Discharged home with strict return precautions, follow[facility] with [facility]opedic [facility]. ED RESULTS   Laboratory results:  Labs Reviewed - No data to display    Radiologic studies results:  XR FOOT LEFT (MIN 3 VIEWS)   Final Result   Acute, nondisplaced, hairline caliber fracture, midshaft 2nd metatarsal.   Degenerative changes. This document has been electronically signed by: Geremias Ballard MD on    10/26/2021 05:26 AM          ED Medications administered this visit:   Medications   ketorolac (TORADOL) injection 30 mg (30 mg IntraMUSCular Given 10/26/21 0441)         ED COURSE         Strict return precautions and follow up instructions were discussed with the patient prior to discharge, with which the patient agrees.       MEDICATION CHANGES     Discharge Medication List as of 10/26/2021  5:44 AM            FINAL DISPOSITION     Final diagnoses:   Closed nondisplaced fracture of metatarsal bone of left foot, unspecified metatarsal, initial encounter     Condition: condition: good  Dispo: Discharge to home      This transcription was electronically signed. Parts of this transcriptions may have been dictated by use of voice recognition software and electronically transcribed, and parts may have been transcribed with the assistance of an ED scribe. The transcription may contain errors not detected in proofreading. Please refer to my supervising physician's documentation if my documentation differs.     Electronically Signed: Sudheer Loyd MD, 10/26/21, 6:46 AM          Sudheer Loyd MD  Resident  10/26/21 8814

## 2021-10-26 NOTE — ED PROVIDER NOTES

## 2021-11-12 DIAGNOSIS — M47.816 LUMBAR SPONDYLOSIS: ICD-10-CM

## 2021-11-12 DIAGNOSIS — G89.4 CHRONIC PAIN SYNDROME: ICD-10-CM

## 2021-11-12 DIAGNOSIS — F11.90 CHRONIC, CONTINUOUS USE OF OPIOIDS: ICD-10-CM

## 2021-11-12 DIAGNOSIS — M54.17 LUMBOSACRAL RADICULITIS: ICD-10-CM

## 2021-11-12 DIAGNOSIS — M48.061 SPINAL STENOSIS OF LUMBAR REGION WITHOUT NEUROGENIC CLAUDICATION: ICD-10-CM

## 2021-11-12 DIAGNOSIS — M79.604 RIGHT LEG PAIN: ICD-10-CM

## 2021-11-12 RX ORDER — GABAPENTIN 300 MG/1
CAPSULE ORAL
Qty: 60 CAPSULE | Refills: 0 | Status: SHIPPED | OUTPATIENT
Start: 2021-11-14 | End: 2022-10-24

## 2021-11-12 RX ORDER — HYDROCODONE BITARTRATE AND ACETAMINOPHEN 5; 325 MG/1; MG/1
1 TABLET ORAL EVERY 6 HOURS PRN
Qty: 120 TABLET | Refills: 0 | Status: SHIPPED | OUTPATIENT
Start: 2021-11-16 | End: 2021-12-16

## 2021-11-12 NOTE — TELEPHONE ENCOUNTER
OARRS reviewed. UDS: negative   Last seen: 9/16/2021.  Follow-up:   Future Appointments   Date Time Provider Francesco Flores   12/16/2021 11:30 AM FRANK Blake - CNP [facility] [address]         *per last note on 9/16/21 pt reported his last dose of Norco and Gabapentin was 2 days prior to appointment (9/14/21)

## 2021-12-13 ENCOUNTER — TELEPHONE (OUTPATIENT)
Dept: PHYSICAL MEDICINE AND REHAB | Age: 61
End: 2021-12-13

## 2021-12-13 DIAGNOSIS — M79.2 NERVE PAIN: ICD-10-CM

## 2021-12-13 DIAGNOSIS — M25.511 ACUTE PAIN OF RIGHT SHOULDER: ICD-10-CM

## 2021-12-13 DIAGNOSIS — G89.4 CHRONIC PAIN SYNDROME: ICD-10-CM

## 2021-12-13 DIAGNOSIS — M54.17 LUMBOSACRAL RADICULITIS: Primary | ICD-10-CM

## 2021-12-14 DIAGNOSIS — M25.511 ACUTE PAIN OF RIGHT SHOULDER: ICD-10-CM

## 2021-12-14 DIAGNOSIS — G89.4 CHRONIC PAIN SYNDROME: ICD-10-CM

## 2021-12-14 DIAGNOSIS — M54.17 LUMBOSACRAL RADICULITIS: Primary | ICD-10-CM

## 2021-12-14 DIAGNOSIS — M79.2 NERVE PAIN: ICD-10-CM

## 2022-01-18 NOTE — TELEPHONE ENCOUNTER
1/18 Pt stopped in office and stated that he needed his Hydrocodone-Acetaminophen 5-325 MG and his Gabapentin 300 MG refilled.

## 2022-01-31 ENCOUNTER — TELEPHONE (OUTPATIENT)
Dept: PHYSICAL MEDICINE AND REHAB | Age: 62
End: 2022-01-31

## 2022-01-31 ENCOUNTER — APPOINTMENT (OUTPATIENT)
Dept: GENERAL RADIOLOGY | Age: 62
End: 2022-01-31
Payer: MEDICARE

## 2022-01-31 ENCOUNTER — HOSPITAL ENCOUNTER (EMERGENCY)
Age: 62
Discharge: HOME | End: 2022-01-31
Payer: MEDICARE

## 2022-01-31 VITALS
TEMPERATURE: 98 F | HEART RATE: 88 BPM | DIASTOLIC BLOOD PRESSURE: 92 MMHG | WEIGHT: 197 LBS | BODY MASS INDEX: 26.11 KG/M2 | SYSTOLIC BLOOD PRESSURE: 138 MMHG | HEIGHT: 73 IN | RESPIRATION RATE: 16 BRPM | OXYGEN SATURATION: 95 %

## 2022-01-31 DIAGNOSIS — S20.212A CONTUSION OF RIB ON LEFT SIDE, INITIAL ENCOUNTER: Primary | ICD-10-CM

## 2022-01-31 PROCEDURE — 99284 EMERGENCY DEPT VISIT MOD MDM: CPT

## 2022-01-31 PROCEDURE — 71101 X-RAY EXAM UNILAT RIBS/CHEST: CPT

## 2022-01-31 PROCEDURE — 6370000000 HC RX 637 (ALT 250 FOR IP): Performed by: NURSE PRACTITIONER

## 2022-01-31 RX ORDER — LIDOCAINE 50 MG/G
1 PATCH TOPICAL DAILY
Qty: 30 PATCH | Refills: 0 | Status: SHIPPED | OUTPATIENT
Start: 2022-01-31

## 2022-01-31 RX ORDER — LIDOCAINE 4 G/G
1 PATCH TOPICAL ONCE
Status: DISCONTINUED | OUTPATIENT
Start: 2022-01-31 | End: 2022-01-31 | Stop reason: HOSPADM

## 2022-01-31 RX ORDER — IBUPROFEN 800 MG/1
800 TABLET ORAL EVERY 8 HOURS PRN
Qty: 30 TABLET | Refills: 0 | Status: SHIPPED | OUTPATIENT
Start: 2022-01-31

## 2022-01-31 RX ORDER — IBUPROFEN 200 MG
600 TABLET ORAL ONCE
Status: COMPLETED | OUTPATIENT
Start: 2022-01-31 | End: 2022-01-31

## 2022-01-31 RX ADMIN — IBUPROFEN 600 MG: 200 TABLET, FILM COATED ORAL at 03:50

## 2022-01-31 ASSESSMENT — PAIN DESCRIPTION - LOCATION
LOCATION: RIB CAGE
LOCATION: RIB CAGE

## 2022-01-31 ASSESSMENT — PAIN SCALES - GENERAL
PAINLEVEL_OUTOF10: 10
PAINLEVEL_OUTOF10: 10
PAINLEVEL_OUTOF10: 7

## 2022-01-31 ASSESSMENT — PAIN DESCRIPTION - ORIENTATION
ORIENTATION: LEFT
ORIENTATION: LEFT

## 2022-01-31 ASSESSMENT — PAIN DESCRIPTION - PAIN TYPE
TYPE: ACUTE PAIN
TYPE: ACUTE PAIN

## 2022-01-31 NOTE — ED NOTES
Pt reports decrease in pain at this time. No adverse reactions noted to medications.      Rosina Castro RN  01/31/22 [id number]

## 2022-01-31 NOTE — ED TRIAGE NOTES
Pt arrives with c/o left sided rib injury. Pt states he was reaching up in his truck on Thursday when he slipped and slammed his ribs into the truck. Pt c/o pain since injury. Pt states \"I don't want blood work or anything. I just need an x-ray. \"

## 2022-02-01 ASSESSMENT — ENCOUNTER SYMPTOMS
BACK PAIN: 0
VOMITING: 0
EYE REDNESS: 0
NAUSEA: 0
COUGH: 0
ABDOMINAL PAIN: 0
CHEST TIGHTNESS: 0
RHINORRHEA: 0

## 2022-02-02 NOTE — ED PROVIDER NOTES
Medication List as of 2022  4:43 AM      CONTINUE these medications which have NOT CHANGED    Details   gabapentin (NEURONTIN) 300 MG capsule take 1 capsule by mouth twice a day, Disp-60 capsule, R-0Normal      tamsulosin (FLOMAX) 0.4 MG capsule Take 1 capsule by mouth daily Take one capsule daily to facilitate passage of ureteral stone, Disp-21 capsule, R-6Normal      lactase (LACTAID) 3000 units tablet Take 1 tablet by mouth 3 times daily (with meals), Disp-30 tablet, R-0Print      Calcium Polycarbophil (FIBER) 625 MG TABS Take 1 tablet by mouth daily, Disp-14 tablet, R-0Print      FLUoxetine (PROZAC) 20 MG capsule Take 20 mg by mouth daily. metoprolol (TOPROL-XL) 50 MG XL tablet Take 50 mg by mouth daily. ALLERGIES     has No Known Allergies. FAMILY HISTORY     He indicated that his mother is alive. He indicated that his father is . family history includes Diabetes in his father and mother.     SOCIAL HISTORY       Social History     Socioeconomic History    Marital status: Single     Spouse name: Not on file    Number of children: Not on file    Years of education: Not on file    Highest education level: Not on file   Occupational History    Not on file   Tobacco Use    Smoking status: Current Every Day Smoker     Packs/day: 0.25     Years: 30.00     Pack years: 7.50     Types: Cigarettes     Start date: 1983    Smokeless tobacco: Never Used   Vaping Use    Vaping Use: Never used   Substance and Sexual Activity    Alcohol use: Not Currently     Comment: occasionally    Drug use: No     Comment: hx of    Sexual activity: Yes     Partners: Female   Other Topics Concern    Not on file   Social History Narrative    Not on file     Social Determinants of Health     Financial Resource Strain:     Difficulty of Paying Living Expenses: Not on file   Food Insecurity:     Worried About Running Out of Food in the Last Year: Not on file    [address] in the Last Year: Not on file   Transportation Needs:     Lack of Transportation (Medical): Not on file    Lack of Transportation (Non-Medical): Not on file   Physical Activity:     Days of Exercise per Week: Not on file    Minutes of Exercise per Session: Not on file   Stress:     Feeling of Stress : Not on file   Social Connections:     Frequency of Communication with Friends and Family: Not on file    Frequency of Social Gatherings with Friends and Family: Not on file    Attends [employer] Services: Not on file    Active Member of [address] [place] [address] [employer] or Organizations: Not on file    Attends Club or Organization Meetings: Not on file    Marital Status: Not on file   Intimate Partner Violence:     Fear of Current or Ex-Partner: Not on file    Emotionally Abused: Not on file    Physically Abused: Not on file    Sexually Abused: Not on file   Housing Stability:     Unable to Pay for Housing in the Last Year: Not on file    Number of [place] in the Last Year: Not on file    Unstable Housing in the Last Year: Not on file       PHYSICAL EXAM     INITIAL VITALS:  height is 6' 1\" (1.854 m) and weight is 197 lb (89.4 kg). His oral temperature is 98 °F (36.7 °C). His blood pressure is 138/92 (abnormal) and his pulse is 88. His respiration is 16 and oxygen saturation is 95%. Physical Exam  Constitutional:       Appearance: Normal appearance. He is well-developed. He is not ill-appearing. HENT:      Head: Normocephalic and atraumatic. Nose: Nose normal.      Mouth/Throat:      Mouth: Mucous membranes are moist.      Pharynx: Oropharynx is clear. Eyes:      Conjunctiva/sclera: Conjunctivae normal.   Cardiovascular:      Rate and Rhythm: Normal rate. Pulses: Normal pulses. Pulmonary:      Effort: Pulmonary effort is normal.   Abdominal:      Palpations: Abdomen is soft. Musculoskeletal:         General: Normal range of motion. Arms:       Cervical back: Normal range of motion.    Skin:     General: Skin pandemic. All efforts were made for HIPPA compliance as well as provision of appropriate care. Patient was seen independently by myself. The patient's final impression and disposition and plan was determined by myself. Strict return precautions and follow up instructions were discussed with the patient prior to discharge, with which the patient agrees. Physical assessment findings, diagnostic testing(s) if applicable, and vital signs reviewed with patient/patient representative. Questions answered. Medications asdirected, including OTC medications for supportive care. Education provided on medications. Differential diagnosis(s) discussed with patient/patient representative. Home care/self care instructions reviewed withpatient/patient representative. Patient is to follow-up with family care provider in 2-3 days if no improvement. Patient is to go to the emergency department if symptoms worsen. Patient/patient representative isaware of care plan, questions answered, verbalizes understanding and is in agreement. CRITICAL CARE:   None    CONSULTS:  None    PROCEDURES:  None    FINAL IMPRESSION     1.  Contusion of rib on left side, initial encounter          DISPOSITION/PLAN   DISPOSITION Decision To Discharge 01/31/2022 04:40:24 AM      PATIENT REFERREDTO:  Tiana Saba [address] [phone]    Schedule an appointment as soon as possible for a visit in 2 days  For follow up      DISCHARGE MEDICATIONS:  Discharge Medication List as of 1/31/2022  4:43 AM      START taking these medications    Details   lidocaine (LIDODERM) 5 % Place 1 patch onto the skin daily 12 hours on, 12 hours off., Disp-30 patch, R-0Normal             (Please note that portions of this note were completed with a voice recognition program.  Efforts were made to edit the dictations but occasionally words are mis-transcribed.)         Suki Torre, APRN - CNP         Suki Torre, FRANK - CNP  02/01/22 1704

## 2022-02-10 ENCOUNTER — TELEPHONE (OUTPATIENT)
Dept: UROLOGY | Age: 62
End: 2022-02-10

## 2022-06-10 RX ORDER — TAMSULOSIN HYDROCHLORIDE 0.4 MG/1
0.4 CAPSULE ORAL DAILY
Qty: 90 CAPSULE | Refills: 3 | Status: SHIPPED | OUTPATIENT
Start: 2022-06-10 | End: 2022-07-10

## 2022-06-10 NOTE — TELEPHONE ENCOUNTER
Yessy Ceron called requesting a refill on the following medications:  Requested Prescriptions     Pending Prescriptions Disp Refills    tamsulosin (FLOMAX) 0.4 mg capsule 90 capsule 3     Sig: Take 1 capsule by mouth daily Take one capsule daily to facilitate passage of ureteral stone     Pharmacy verified:  [facility]      Date of last visit:   Date of next visit (if applicable): 2/41/5205

## 2022-06-27 ENCOUNTER — HOSPITAL ENCOUNTER (EMERGENCY)
Age: 62
Discharge: HOME | End: 2022-06-27
Attending: EMERGENCY MEDICINE
Payer: OTHER GOVERNMENT

## 2022-06-27 VITALS
OXYGEN SATURATION: 99 % | BODY MASS INDEX: 23.59 KG/M2 | TEMPERATURE: 97.7 F | DIASTOLIC BLOOD PRESSURE: 87 MMHG | RESPIRATION RATE: 18 BRPM | SYSTOLIC BLOOD PRESSURE: 124 MMHG | HEART RATE: 94 BPM | HEIGHT: 73 IN | WEIGHT: 178 LBS

## 2022-06-27 DIAGNOSIS — G89.29 ACUTE EXACERBATION OF CHRONIC LOW BACK PAIN: Primary | ICD-10-CM

## 2022-06-27 DIAGNOSIS — M54.50 ACUTE EXACERBATION OF CHRONIC LOW BACK PAIN: Primary | ICD-10-CM

## 2022-06-27 PROCEDURE — 99284 EMERGENCY DEPT VISIT MOD MDM: CPT

## 2022-06-27 PROCEDURE — 6360000002 HC RX W HCPCS: Performed by: EMERGENCY MEDICINE

## 2022-06-27 PROCEDURE — 96372 THER/PROPH/DIAG INJ SC/IM: CPT

## 2022-06-27 RX ORDER — METHYLPREDNISOLONE SODIUM SUCCINATE 125 MG/2ML
60 INJECTION, POWDER, LYOPHILIZED, FOR SOLUTION INTRAMUSCULAR; INTRAVENOUS ONCE
Status: COMPLETED | OUTPATIENT
Start: 2022-06-27 | End: 2022-06-27

## 2022-06-27 RX ORDER — KETOROLAC TROMETHAMINE 30 MG/ML
30 INJECTION, SOLUTION INTRAMUSCULAR; INTRAVENOUS ONCE
Status: COMPLETED | OUTPATIENT
Start: 2022-06-27 | End: 2022-06-27

## 2022-06-27 RX ORDER — PREDNISONE 20 MG/1
20 TABLET ORAL DAILY
Qty: 7 TABLET | Refills: 0 | Status: SHIPPED | OUTPATIENT
Start: 2022-06-27 | End: 2022-07-04

## 2022-06-27 RX ADMIN — METHYLPREDNISOLONE SODIUM SUCCINATE 60 MG: 125 INJECTION, POWDER, FOR SOLUTION INTRAMUSCULAR; INTRAVENOUS at 02:47

## 2022-06-27 RX ADMIN — KETOROLAC TROMETHAMINE 30 MG: 30 INJECTION, SOLUTION INTRAMUSCULAR; INTRAVENOUS at 02:47

## 2022-06-27 ASSESSMENT — ENCOUNTER SYMPTOMS
DIARRHEA: 0
WHEEZING: 0
EYE REDNESS: 0
CHEST TIGHTNESS: 0
EYE PAIN: 0
BLOOD IN STOOL: 0
SORE THROAT: 0
VOICE CHANGE: 0
NAUSEA: 0
BACK PAIN: 1
CONSTIPATION: 0
SINUS PRESSURE: 0
EYE DISCHARGE: 0
CHOKING: 0
VOMITING: 0
ABDOMINAL DISTENTION: 0
SHORTNESS OF BREATH: 0
TROUBLE SWALLOWING: 0
COUGH: 0
RHINORRHEA: 0
ABDOMINAL PAIN: 0
EYE ITCHING: 0
PHOTOPHOBIA: 0

## 2022-06-27 ASSESSMENT — PAIN - FUNCTIONAL ASSESSMENT: PAIN_FUNCTIONAL_ASSESSMENT: 0-10

## 2022-06-27 ASSESSMENT — PAIN DESCRIPTION - LOCATION: LOCATION: BACK

## 2022-06-27 ASSESSMENT — PAIN SCALES - GENERAL: PAINLEVEL_OUTOF10: 9

## 2022-06-27 ASSESSMENT — PAIN DESCRIPTION - PAIN TYPE: TYPE: CHRONIC PAIN

## 2022-06-27 NOTE — ED TRIAGE NOTES
Pt presents to the ED through triage with c/c back pain. Pt states he has had this back pain for the last 3 years. Pt states this exacerbation started 3 days ago. Pt states he needs a Toradol shot and prednisone. Rates pain 9/10. Vitals stable.

## 2022-06-27 NOTE — ED PROVIDER NOTES
[facility]  eMERGENCY dEPARTMENT eNCOUnter          CHIEF COMPLAINT       Chief Complaint   Patient presents with    Back Pain       Nurses Notes reviewed and I agree except as noted in the HPI. HISTORY OF PRESENT ILLNESS    Jody Mcgarry is a 64 y.o. male who presents acute exacerbation of chronic low back pain. Patient is here for his usual shots of Toradol and steroids. Patient states nothing is changed. He has no saddle anesthesia or paresthesia. He has no loss of bowel or bladder function. He is able to ambulate without any difficulty. There has been no new trauma. Patient has no other physical complaints at this time. REVIEW OF SYSTEMS     Review of Systems   Constitutional: Negative for activity change, appetite change, diaphoresis, fatigue and unexpected weight change. HENT: Negative for congestion, ear discharge, ear pain, hearing loss, rhinorrhea, sinus pressure, sore throat, trouble swallowing and voice change. Eyes: Negative for photophobia, pain, discharge, redness and itching. Respiratory: Negative for cough, choking, chest tightness, shortness of breath and wheezing. Cardiovascular: Negative for chest pain, palpitations and leg swelling. Gastrointestinal: Negative for abdominal distention, abdominal pain, blood in stool, constipation, diarrhea, nausea and vomiting. Endocrine: Negative for polydipsia, polyphagia and polyuria. Genitourinary: Negative for decreased urine volume, difficulty urinating, dysuria, enuresis, frequency, hematuria, penile swelling, scrotal swelling and urgency. Musculoskeletal: Positive for arthralgias, back pain and myalgias. Negative for gait problem, neck pain and neck stiffness. Skin: Negative for pallor and rash. Allergic/Immunologic: Negative for immunocompromised state. Neurological: Negative for dizziness, tremors, seizures, syncope, facial asymmetry, weakness, light-headedness, numbness and headaches. Hematological: Negative for adenopathy. Does not bruise/bleed easily. Psychiatric/Behavioral: Negative for agitation, hallucinations and suicidal ideas. The patient is not nervous/anxious. PAST MEDICAL HISTORY    has a past medical history of Bipolar 1 disorder ([facility] [address] COPD (chronic obstructive pulmonary disease) ([facility] [address] Hypertension, and Lumbar radiculopathy. SURGICAL HISTORY      has a past surgical history that includes Foot surgery (Right); Tonsillectomy; Colonoscopy (Left, 2019); Upper gastrointestinal endoscopy (Left, 2019); Pain management procedure (Right, 2021); hip surgery (Right, 3/15/2021); Pain management procedure (Right, 2021); and Pain management procedure (Right, 2021). CURRENT MEDICATIONS       Previous Medications    CALCIUM POLYCARBOPHIL (FIBER) 625 MG TABS    Take 1 tablet by mouth daily    FLUOXETINE (PROZAC) 20 MG CAPSULE    Take 20 mg by mouth daily. GABAPENTIN (NEURONTIN) 300 MG CAPSULE    take 1 capsule by mouth twice a day    IBUPROFEN (ADVIL;MOTRIN) 800 MG TABLET    Take 1 tablet by mouth every 8 hours as needed for Pain    LACTASE (LACTAID) 3000 UNITS TABLET    Take 1 tablet by mouth 3 times daily (with meals)    LIDOCAINE (LIDODERM) 5 %    Place 1 patch onto the skin daily 12 hours on, 12 hours off. METOPROLOL (TOPROL-XL) 50 MG XL TABLET    Take 50 mg by mouth daily. TAMSULOSIN (FLOMAX) 0.4 MG CAPSULE    Take 1 capsule by mouth daily Take one capsule daily to facilitate passage of ureteral stone       ALLERGIES     has No Known Allergies. FAMILY HISTORY     He indicated that his mother is alive. He indicated that his father is . family history includes Diabetes in his father and mother. SOCIAL HISTORY      reports that he has been smoking cigarettes. He started smoking about 39 years ago. He has a 7.50 pack-year smoking history. He has never used smokeless tobacco. He reports previous alcohol use.  He reports that he does and negative left straight leg raise test. No scoliosis. Lymphadenopathy:      Cervical: No cervical adenopathy. Skin:     General: Skin is warm and dry. Findings: No bruising, ecchymosis, lesion or rash. Neurological:      Mental Status: He is alert and oriented to person, place, and time. Cranial Nerves: No cranial nerve deficit. Sensory: No sensory deficit. Coordination: Coordination normal.      Deep Tendon Reflexes: Reflexes are normal and symmetric. Psychiatric:         Speech: Speech normal.         Behavior: Behavior normal.         Thought Content: Thought content normal.         Judgment: Judgment normal.           DIFFERENTIAL DIAGNOSIS:   Acute exacerbation of chronic low back pain    DIAGNOSTIC RESULTS     EKG: All EKG's are interpreted by the Emergency Department Physician who either signs or Co-signs this chart in the absence of a cardiologist.  None    RADIOLOGY: non-plain film images(s) such as CT, Ultrasound and MRI are read by the radiologist.  None    LABS:   Labs Reviewed - No data to display    EMERGENCY DEPARTMENT COURSE:   Vitals:    Vitals:    06/27/22 0235   BP: 124/87   Pulse: 94   Resp: 18   Temp: 97.7 °F (36.5 °C)   TempSrc: Oral   SpO2: 99%   Weight: 178 lb (80.7 kg)   Height: 6' 1\" (1.854 m)     Patient was assessed at bedside decision to treat was made. Patient was given shots. He will be given a prescription for steroids at home he is instructed to follow-up with his chronic pain management doctor as well as his primary care physician. Patient understood and agreed to the plan. Patient is subsequently discharged home in stable condition. Patient has what appears to be an acute exacerbation of chronic low back pain. Patient is instructed to take steroids as prescribed. He is instructed take all medications as provided by his pain management doctor.   He is instructed to follow-up with both his pain management physician as well as his primary care physician and do so within the next 1 to 2 days. He is instructed to return to the nearest emergency room immediately for any new or worsening complaints. CRITICAL CARE:   None    CONSULTS:  None    PROCEDURES:  None    FINAL IMPRESSION      1.  Acute exacerbation of chronic low back pain          DISPOSITION/PLAN   Discharge    PATIENT REFERRED TO:  Elise Moss, [address] [place] [address] [phone]    Call in 1 day      Elpidio Crawford MD  [address] [facility]  [phone]    Call in 1 day        DISCHARGE MEDICATIONS:  New Prescriptions    PREDNISONE (DELTASONE) 20 MG TABLET    Take 1 tablet by mouth daily for 7 days       (Please note that portions of this note were completed with a voice recognition program.  Efforts were made to edit the dictations but occasionally words are mis-transcribed.)    Marci Tyson, [address] [place] [address],   06/27/22 4483

## 2022-07-11 ENCOUNTER — HOSPITAL ENCOUNTER (OUTPATIENT)
Dept: ULTRASOUND IMAGING | Age: 62
Discharge: HOME | End: 2022-07-11
Payer: OTHER GOVERNMENT

## 2022-07-11 ENCOUNTER — OFFICE VISIT (OUTPATIENT)
Dept: UROLOGY | Age: 62
End: 2022-07-11
Payer: OTHER GOVERNMENT

## 2022-07-11 VITALS
BODY MASS INDEX: 22.33 KG/M2 | SYSTOLIC BLOOD PRESSURE: 120 MMHG | HEIGHT: 74 IN | DIASTOLIC BLOOD PRESSURE: 76 MMHG | WEIGHT: 174 LBS

## 2022-07-11 DIAGNOSIS — E04.2 NONTOXIC MULTINODULAR GOITER: ICD-10-CM

## 2022-07-11 DIAGNOSIS — N40.1 BENIGN LOCALIZED PROSTATIC HYPERPLASIA WITH LOWER URINARY TRACT SYMPTOMS (LUTS): Primary | ICD-10-CM

## 2022-07-11 LAB
BILIRUBIN URINE: NEGATIVE
BLOOD URINE, POC: NEGATIVE
CHARACTER, URINE: CLEAR
COLOR, URINE: YELLOW
GLUCOSE URINE: NEGATIVE MG/DL
KETONES, URINE: NEGATIVE
LEUKOCYTE CLUMPS, URINE: NEGATIVE
NITRITE, URINE: NEGATIVE
PH, URINE: 6 (ref 5–9)
POST VOID RESIDUAL (PVR): 0 ML
PROTEIN, URINE: NEGATIVE MG/DL
SPECIFIC GRAVITY, URINE: 1.01 (ref 1–1.03)
UROBILINOGEN, URINE: 0.2 EU/DL (ref 0–1)

## 2022-07-11 PROCEDURE — 81003 URINALYSIS AUTO W/O SCOPE: CPT | Performed by: UROLOGY

## 2022-07-11 PROCEDURE — 76536 US EXAM OF HEAD AND NECK: CPT

## 2022-07-11 PROCEDURE — 99214 OFFICE O/P EST MOD 30 MIN: CPT | Performed by: UROLOGY

## 2022-07-11 PROCEDURE — 51798 US URINE CAPACITY MEASURE: CPT | Performed by: UROLOGY

## 2022-07-11 NOTE — PROGRESS NOTES
MD MD [facility] Consultation / New Patient Visit    Patient:  Noel Calderon  YOB: 1960  Date: 7/11/2022  Consult requested from Joselyn Álvarez DO     HISTORY OF PRESENT ILLNESS:   The patient is a 64 y.o. male who presents today for follow-up for the following problem(s): ED  Overall the problem(s) : show no change. Associated Symptoms: No dysuria, gross hematuria. Pain Severity:      Today visit:   7/11/22   ED chornic problem, stable on Trimix. Discussed risks and benefits. Would like to continue the medication.   - He is interetested in [facility] - literature given    BPH with LUTs - chronic unstable problem with worsening frequency urgency and weak stream.  On Tamsulosin, does not like taking it cause it causes decreased libido and retrograde ejaculation. Discussed risks and benefits. Summary of old records:   (Patient's old records, notes and chart reviewed and summarized above.)  Noel Calderon f/u today for Erectile Dysfunction. Previous patient of Dr. Marie Blue. He remains on Trimix injections which are working well for him, however he is interested in vascular reconstructive surgery for his ED. Unable to achieve erection without Trimix injection. He comes in today by himself. Hx is obtained from the patient and medical record.      Last several PSA's:  Lab Results   Component Value Date    PSA 1.24 10/21/2021    PSA 1.20 02/16/2021    PSA 1.08 (H) 11/14/2018       Last total testosterone:  No results found for: TESTOSTERONE    Urinalysis today:  Results for POC orders placed in visit on 07/11/22   POCT Urinalysis No Micro (Auto)   Result Value Ref Range    Glucose, Ur Negative NEGATIVE mg/dl    Bilirubin Urine Negative     Ketones, Urine Negative NEGATIVE    Specific Gravity, Urine 1.015 1.002 - 1.030    Blood, UA POC Negative NEGATIVE    pH, Urine 6.00 5.0 - 9.0    Protein, Urine Negative NEGATIVE mg/dl    Urobilinogen, Urine 0. 20 0.0 - 1.0 eu/dl    Nitrite, Urine Negative NEGATIVE    Leukocyte Clumps, Urine Negative NEGATIVE    Color, Urine Yellow [id number]-STRAW    Character, Urine Clear [id number].[id number]   poct post void residual   Result Value Ref Range    post void residual 0 ml         Last BUN and creatinine:  Lab Results   Component Value Date    BUN 14 11/18/2018     Lab Results   Component Value Date    CREATININE 0.90 10/21/2021       Imaging Reviewed during this Office Visit:   (results were independently reviewed by physician and radiology report verified)    PAST MEDICAL, FAMILY AND SOCIAL HISTORY:  Past Medical History:   Diagnosis Date    Bipolar 1 disorder ([facility] [address]     COPD (chronic obstructive pulmonary disease) ([facility] [address]     Hypertension     Lumbar radiculopathy      Past Surgical History:   Procedure Laterality Date    COLONOSCOPY Left 6/21/2019    COLONOSCOPY DIAGNOSTIC performed by Martina Jenkins MD at [facility] Right     HIP SURGERY Right 3/15/2021    right hip steroid injection no sedation performed by Jt Jimenez MD at [facility] Right 2/8/2021    RIGHT TFLESI @ L4 and L5. performed by Jt Jimenez MD at [facility] Right 4/20/2021    LESI @L4#1 right performed by Jt Jimenez MD at [facility] Right 6/22/2021    right LESI # 2 @ L3 performed by Jt Jimenez MD at [address] [place] [address] ENDOSCOPY Left 6/21/2019    EGD BIOPSY performed by Martina Jenkins MD at [facility]     Family History   Problem Relation Age of Onset    Diabetes Mother     Diabetes Father      Outpatient Medications Marked as Taking for the 7/11/22 encounter (Office Visit) with Giovany Reina MD   Medication Sig Dispense Refill    lidocaine (LIDODERM) 5 % Place 1 patch onto the skin daily 12 hours on, 12 hours off. 30 patch 0    ibuprofen (ADVIL;MOTRIN) 800 MG tablet Take 1 tablet by mouth every 8 hours as needed for Pain 30 tablet 0    lactase (LACTAID) 3000 units tablet Take 1 tablet by mouth 3 times daily (with meals) 30 tablet 0    Calcium Polycarbophil (FIBER) 625 MG TABS Take 1 tablet by mouth daily 14 tablet 0    FLUoxetine (PROZAC) 20 MG capsule Take 20 mg by mouth daily.  metoprolol (TOPROL-XL) 50 MG XL tablet Take 50 mg by mouth daily. Patient has no known allergies. Social History     Tobacco Use   Smoking Status Current Every Day Smoker    Packs/day: 0.25    Years: 30.00    Pack years: 7.50    Types: Cigarettes    Start date: 4/27/1983   Smokeless Tobacco Never Used       Social History     Substance and Sexual Activity   Alcohol Use Not Currently    Comment: occasionally       REVIEW OF SYSTEMS:  Constitutional: negative  Eyes: negative  Respiratory: negative  Cardiovascular: negative  Gastrointestinal: negative  Musculoskeletal: negative  Genitourinary: negative  Skin: negative   Neurological: negative  Hematological/Lymphatic: negative  Psychological: negative    Physical Exam:    This a 64 y.o. male   Vitals:    07/11/22 0953   BP: 120/76     Constitutional: Patient in no acute distress   Neuro: alert and oriented to person place and time. Psych: Mood and affect normal.  Head: atraumatic normocephalic  Eyes: EOMi  HEENT: neck supple, trachea midline  Lungs: Respiratory effort normal  Cardiovascular:  Normal peripheral pulses  Abdomen: Soft, non-tender, non-distended, No CVA  Bladder: non-tender and not distended. FROMx4, no cyanosis clubbing edema  Skin: warm and dry      Assessment and Plan      1. Benign localized prostatic hyperplasia with lower urinary tract symptoms (LUTS)           Plan:      No follow-ups on file. Refill Trimix for ED - success - Follow up 1 year for maintenance  Flomax - will start, monitor symptoms. Follow up 3 months for med check with [facility].   - offered cystoscopy for BPH

## 2022-08-23 ENCOUNTER — HOSPITAL ENCOUNTER (EMERGENCY)
Age: 62
Discharge: HOME | End: 2022-08-24
Attending: EMERGENCY MEDICINE
Payer: OTHER GOVERNMENT

## 2022-08-23 DIAGNOSIS — B34.9 VIRAL ILLNESS: Primary | ICD-10-CM

## 2022-08-23 PROCEDURE — 99284 EMERGENCY DEPT VISIT MOD MDM: CPT

## 2022-08-23 ASSESSMENT — PAIN - FUNCTIONAL ASSESSMENT: PAIN_FUNCTIONAL_ASSESSMENT: NONE - DENIES PAIN

## 2022-08-24 ENCOUNTER — APPOINTMENT (OUTPATIENT)
Dept: GENERAL RADIOLOGY | Age: 62
End: 2022-08-24
Payer: OTHER GOVERNMENT

## 2022-08-24 VITALS
OXYGEN SATURATION: 98 % | DIASTOLIC BLOOD PRESSURE: 83 MMHG | RESPIRATION RATE: 17 BRPM | HEART RATE: 83 BPM | SYSTOLIC BLOOD PRESSURE: 135 MMHG | TEMPERATURE: 97.3 F

## 2022-08-24 LAB
ALBUMIN SERPL-MCNC: 4.1 G/DL (ref 3.5–5.1)
ALP BLD-CCNC: 92 U/L (ref 38–126)
ALT SERPL-CCNC: 12 U/L (ref 11–66)
AMPHETAMINE+METHAMPHETAMINE URINE SCREEN: NEGATIVE
ANION GAP SERPL CALCULATED.3IONS-SCNC: 13 MEQ/L (ref 8–16)
AST SERPL-CCNC: 23 U/L (ref 5–40)
BARBITURATE QUANTITATIVE URINE: NEGATIVE
BASOPHILS # BLD: 1 %
BASOPHILS ABSOLUTE: 0.1 THOU/MM3 (ref 0–0.1)
BENZODIAZEPINE QUANTITATIVE URINE: NEGATIVE
BILIRUB SERPL-MCNC: 0.2 MG/DL (ref 0.3–1.2)
BILIRUBIN URINE: NEGATIVE
BLOOD, URINE: NEGATIVE
BUN BLDV-MCNC: 7 MG/DL (ref 7–22)
CALCIUM SERPL-MCNC: 9.3 MG/DL (ref 8.5–10.5)
CANNABINOID QUANTITATIVE URINE: NEGATIVE
CHARACTER, URINE: CLEAR
CHLORIDE BLD-SCNC: 102 MEQ/L (ref 98–111)
CO2: 21 MEQ/L (ref 23–33)
COCAINE METABOLITE QUANTITATIVE URINE: NEGATIVE
COLOR: YELLOW
CREAT SERPL-MCNC: 1 MG/DL (ref 0.4–1.2)
EOSINOPHIL # BLD: 9.9 %
EOSINOPHILS ABSOLUTE: 0.6 THOU/MM3 (ref 0–0.4)
ERYTHROCYTE [DISTWIDTH] IN BLOOD BY AUTOMATED COUNT: 13.7 % (ref 11.5–14.5)
ERYTHROCYTE [DISTWIDTH] IN BLOOD BY AUTOMATED COUNT: 44.3 FL (ref 35–45)
FLU A ANTIGEN: NEGATIVE
FLU B ANTIGEN: NEGATIVE
GFR SERPL CREATININE-BSD FRML MDRD: > 90 ML/MIN/1.73M2
GLUCOSE BLD-MCNC: 96 MG/DL (ref 70–108)
GLUCOSE URINE: NEGATIVE MG/DL
HCT VFR BLD CALC: 41.6 % (ref 42–52)
HEMOGLOBIN: 13.7 GM/DL (ref 14–18)
IMMATURE GRANS (ABS): 0.01 THOU/MM3 (ref 0–0.07)
IMMATURE GRANULOCYTES: 0.2 %
KETONES, URINE: NEGATIVE
LEUKOCYTE ESTERASE, URINE: NEGATIVE
LYMPHOCYTES # BLD: 51.8 %
LYMPHOCYTES ABSOLUTE: 3 THOU/MM3 (ref 1–4.8)
MAGNESIUM: 2 MG/DL (ref 1.6–2.4)
MCH RBC QN AUTO: 29.3 PG (ref 26–33)
MCHC RBC AUTO-ENTMCNC: 32.9 GM/DL (ref 32.2–35.5)
MCV RBC AUTO: 88.9 FL (ref 80–94)
MONOCYTES # BLD: 8.9 %
MONOCYTES ABSOLUTE: 0.5 THOU/MM3 (ref 0.4–1.3)
NITRITE, URINE: NEGATIVE
NUCLEATED RED BLOOD CELLS: 0 /100 WBC
OPIATES, URINE: NEGATIVE
OSMOLALITY CALCULATION: 269.8 MOSMOL/KG (ref 275–300)
OXYCODONE: NEGATIVE
PH UA: 7.5 (ref 5–9)
PHENCYCLIDINE QUANTITATIVE URINE: NEGATIVE
PLATELET # BLD: 257 THOU/MM3 (ref 130–400)
PMV BLD AUTO: 9.8 FL (ref 9.4–12.4)
POTASSIUM SERPL-SCNC: 4.4 MEQ/L (ref 3.5–5.2)
PROTEIN UA: NEGATIVE
RBC # BLD: 4.68 MILL/MM3 (ref 4.7–6.1)
SARS-COV-2, NAAT: NOT  DETECTED
SEG NEUTROPHILS: 28.2 %
SEGMENTED NEUTROPHILS ABSOLUTE COUNT: 1.6 THOU/MM3 (ref 1.8–7.7)
SODIUM BLD-SCNC: 136 MEQ/L (ref 135–145)
SPECIFIC GRAVITY, URINE: < 1.005 (ref 1–1.03)
TOTAL PROTEIN: 6.9 G/DL (ref 6.1–8)
UROBILINOGEN, URINE: 0.2 EU/DL (ref 0–1)
WBC # BLD: 5.7 THOU/MM3 (ref 4.8–10.8)

## 2022-08-24 PROCEDURE — 80053 COMPREHEN METABOLIC PANEL: CPT

## 2022-08-24 PROCEDURE — 81003 URINALYSIS AUTO W/O SCOPE: CPT

## 2022-08-24 PROCEDURE — 83735 ASSAY OF MAGNESIUM: CPT

## 2022-08-24 PROCEDURE — 85025 COMPLETE CBC W/AUTO DIFF WBC: CPT

## 2022-08-24 PROCEDURE — 87804 INFLUENZA ASSAY W/OPTIC: CPT

## 2022-08-24 PROCEDURE — 36415 COLL VENOUS BLD VENIPUNCTURE: CPT

## 2022-08-24 PROCEDURE — 71045 X-RAY EXAM CHEST 1 VIEW: CPT

## 2022-08-24 PROCEDURE — 87635 SARS-COV-2 COVID-19 AMP PRB: CPT

## 2022-08-24 PROCEDURE — 80307 DRUG TEST PRSMV CHEM ANLYZR: CPT

## 2022-08-24 ASSESSMENT — ENCOUNTER SYMPTOMS
CHEST TIGHTNESS: 0
SORE THROAT: 0
SINUS PRESSURE: 0
ABDOMINAL DISTENTION: 0
EYE ITCHING: 0
EYE PAIN: 0
EYE REDNESS: 0
BACK PAIN: 0
CONSTIPATION: 0
RHINORRHEA: 0
SHORTNESS OF BREATH: 0
EYE DISCHARGE: 0
NAUSEA: 0
DIARRHEA: 0
COUGH: 1
TROUBLE SWALLOWING: 0
PHOTOPHOBIA: 0
BLOOD IN STOOL: 0
ABDOMINAL PAIN: 0
VOMITING: 0
VOICE CHANGE: 0
CHOKING: 0
WHEEZING: 0

## 2022-08-24 NOTE — DISCHARGE INSTRUCTIONS
The patient has what appears to be a minor viral illness. Patient is instructed to use over-the-counter cough cold and flu medication. He is instructed use Tylenol Motrin for any pain. He is instructed to follow-up with his primary care physician to do so within the next 1 to 2 days. He is instructed return to the nearest emergency room immediately for any new or worsening complaints.

## 2022-08-24 NOTE — ED PROVIDER NOTES
[facility]  eMERGENCY dEPARTMENT eNCOUnter          CHIEF COMPLAINT       Chief Complaint   Patient presents with    Concern For COVID-19       Nurses Notes reviewed and I agree except as noted in the HPI. HISTORY OF PRESENT ILLNESS    Crys Juarez is a 58 y.o. male who presents with feelings of fatigue. Patient states that he has not felt well for the last 2 weeks. Patient states that he may be borderline diabetic. Patient states he is seeing his doctor tomorrow. Patient states that he has had decreased appetite. He is not complaining of increased thirst or increased urination. Patient is not having any pain at this time. Patient was concerned that he may have COVID so he decided come in for evaluation and treatment. Patient denies any chest pain shortness of breath abdominal pain or changes in bowel or bladder habits. He states that his stool has been rather darker lately. Patient is not taking any new medications. Patient is otherwise resting comfortably on the cot no apparent distress no other physical complaints at this time. REVIEW OF SYSTEMS     Review of Systems   Constitutional:  Positive for fatigue. Negative for activity change, appetite change, chills, diaphoresis, fever and unexpected weight change. HENT:  Positive for congestion. Negative for ear discharge, ear pain, hearing loss, rhinorrhea, sinus pressure, sore throat, trouble swallowing and voice change. Eyes:  Negative for photophobia, pain, discharge, redness and itching. Respiratory:  Positive for cough. Negative for choking, chest tightness, shortness of breath and wheezing. Cardiovascular:  Negative for chest pain, palpitations and leg swelling. Gastrointestinal:  Negative for abdominal distention, abdominal pain, blood in stool, constipation, diarrhea, nausea and vomiting. Endocrine: Negative for polydipsia, polyphagia and polyuria.    Genitourinary:  Negative for decreased urine volume, difficulty urinating, dysuria, enuresis, frequency, hematuria, penile pain, penile swelling, scrotal swelling and urgency. Musculoskeletal:  Negative for arthralgias, back pain, gait problem, myalgias, neck pain and neck stiffness. Skin:  Negative for pallor and rash. Allergic/Immunologic: Negative for immunocompromised state. Neurological:  Negative for dizziness, tremors, seizures, syncope, facial asymmetry, weakness, light-headedness, numbness and headaches. Hematological:  Negative for adenopathy. Does not bruise/bleed easily. Psychiatric/Behavioral:  Negative for agitation, hallucinations and suicidal ideas. The patient is not nervous/anxious. PAST MEDICAL HISTORY    has a past medical history of Bipolar 1 disorder ([facility] [address] COPD (chronic obstructive pulmonary disease) ([facility] [address] Hypertension, and Lumbar radiculopathy. SURGICAL HISTORY      has a past surgical history that includes Foot surgery (Right); Tonsillectomy; Colonoscopy (Left, 6/21/2019); Upper gastrointestinal endoscopy (Left, 6/21/2019); Pain management procedure (Right, 2/8/2021); hip surgery (Right, 3/15/2021); Pain management procedure (Right, 4/20/2021); and Pain management procedure (Right, 6/22/2021).     CURRENT MEDICATIONS       Discharge Medication List as of 8/24/2022  2:09 AM        CONTINUE these medications which have NOT CHANGED    Details   tamsulosin (FLOMAX) 0.4 mg capsule Take 1 capsule by mouth daily Take one capsule daily to facilitate passage of ureteral stone, Disp-90 capsule, R-3Normal      lidocaine (LIDODERM) 5 % Place 1 patch onto the skin daily 12 hours on, 12 hours off., Disp-30 patch, R-0Normal      ibuprofen (ADVIL;MOTRIN) 800 MG tablet Take 1 tablet by mouth every 8 hours as needed for Pain, Disp-30 tablet, R-0Normal      gabapentin (NEURONTIN) 300 MG capsule take 1 capsule by mouth twice a day, Disp-60 capsule, R-0Normal      lactase (LACTAID) 3000 units tablet Take 1 tablet by mouth 3 times daily (with meals), Disp-30 tablet, R-0Print      Calcium Polycarbophil (FIBER) 625 MG TABS Take 1 tablet by mouth daily, Disp-14 tablet, R-0Print      FLUoxetine (PROZAC) 20 MG capsule Take 20 mg by mouth daily. metoprolol (TOPROL-XL) 50 MG XL tablet Take 50 mg by mouth daily. ALLERGIES     has No Known Allergies. FAMILY HISTORY     He indicated that his mother is alive. He indicated that his father is . family history includes Diabetes in his father and mother. SOCIAL HISTORY      reports that he has been smoking cigarettes. He started smoking about 39 years ago. He has a 7.50 pack-year smoking history. He has never used smokeless tobacco. He reports that he does not currently use alcohol. He reports that he does not use drugs. PHYSICAL EXAM     INITIAL VITALS:  oral temperature is 97.3 °F (36.3 °C). His blood pressure is 135/83 and his pulse is 83. His respiration is 17 and oxygen saturation is 98%. Physical Exam  Vitals and nursing note reviewed. Constitutional:       General: He is not in acute distress. Appearance: He is well-developed. He is not diaphoretic. HENT:      Head: Normocephalic and atraumatic. Right Ear: Tympanic membrane, ear canal and external ear normal.      Left Ear: Tympanic membrane, ear canal and external ear normal.      Nose: Nose normal.      Mouth/Throat:      Mouth: Mucous membranes are moist.      Pharynx: Oropharynx is clear. Eyes:      General: Lids are normal. No scleral icterus. Right eye: No discharge. Left eye: No discharge. Conjunctiva/sclera: Conjunctivae normal.      Right eye: No exudate. Left eye: No exudate. Pupils: Pupils are equal, round, and reactive to light. Neck:      Thyroid: No thyromegaly. Vascular: No JVD. Trachea: No tracheal deviation. Cardiovascular:      Rate and Rhythm: Normal rate and regular rhythm. Pulses: Normal pulses.            Carotid pulses are 2+ on the right side and 2+ on the left side. Radial pulses are 2+ on the right side and 2+ on the left side. Femoral pulses are 2+ on the right side and 2+ on the left side. Popliteal pulses are 2+ on the right side and 2+ on the left side. Dorsalis pedis pulses are 2+ on the right side and 2+ on the left side. Posterior tibial pulses are 2+ on the right side and 2+ on the left side. Heart sounds: Normal heart sounds, S1 normal and S2 normal. No murmur heard. No friction rub. No gallop. Pulmonary:      Effort: Pulmonary effort is normal. No respiratory distress. Breath sounds: Normal breath sounds. No stridor. No decreased breath sounds, wheezing, rhonchi or rales. Chest:      Chest wall: No tenderness. Abdominal:      General: Bowel sounds are normal. There is no distension. Palpations: Abdomen is soft. There is no mass. Tenderness: There is no abdominal tenderness. There is no right CVA tenderness, left CVA tenderness, guarding or rebound. Negative signs include Quinones's sign, Rovsing's sign, McBurney's sign, psoas sign and obturator sign. Hernia: No hernia is present. Musculoskeletal:         General: No tenderness. Normal range of motion. Cervical back: Normal range of motion and neck supple. Normal range of motion. Right lower leg: No edema. Left lower leg: No edema. Lymphadenopathy:      Cervical: No cervical adenopathy. Skin:     General: Skin is warm and dry. Capillary Refill: Capillary refill takes less than 2 seconds. Findings: No bruising, ecchymosis, lesion or rash. Neurological:      General: No focal deficit present. Mental Status: He is alert and oriented to person, place, and time. Mental status is at baseline. Cranial Nerves: No cranial nerve deficit. Sensory: No sensory deficit. Motor: No weakness.       Coordination: Coordination normal.      Gait: Gait normal.      Deep Tendon Reflexes: Reflexes are normal and symmetric. Reflexes normal.   Psychiatric:         Speech: Speech normal.         Behavior: Behavior normal.         Thought Content: Thought content normal.         Judgment: Judgment normal.         DIFFERENTIAL DIAGNOSIS:   Viral URI, pneumonia bronchitis COVID-19 electrolyte abnormality    DIAGNOSTIC RESULTS     EKG: All EKG's are interpreted by the Emergency Department Physician who either signs or Co-signs this chart in the absence of a cardiologist.  None    RADIOLOGY: non-plain film images(s) such as CT, Ultrasound and MRI are read by the radiologist.  XR CHEST PORTABLE   Final Result   Impression:      No acute processes. This document has been electronically signed by: Jennifer Babb MD on    08/24/2022 12:50 AM            LABS:   Labs Reviewed   CBC WITH AUTO DIFFERENTIAL - Abnormal; Notable for the following components:       Result Value    RBC 4.68 (*)     Hemoglobin 13.7 (*)     Hematocrit 41.6 (*)     Segs Absolute 1.6 (*)     Eosinophils Absolute 0.6 (*)     All other components within normal limits   COMPREHENSIVE METABOLIC PANEL - Abnormal; Notable for the following components:    CO2 21 (*)     Total Bilirubin 0.2 (*)     All other components within normal limits   OSMOLALITY - Abnormal; Notable for the following components:    Osmolality Calc 269.8 (*)     All other components within normal limits   RAPID INFLUENZA A/B ANTIGENS   COVID-19, RAPID   MAGNESIUM   URINALYSIS WITH REFLEX TO CULTURE   URINE DRUG SCREEN   ANION GAP   GLOMERULAR FILTRATION RATE, ESTIMATED       EMERGENCY DEPARTMENT COURSE:   Vitals:    Vitals:    08/23/22 2358   BP: 135/83   Pulse: 83   Resp: 17   Temp: 97.3 °F (36.3 °C)   TempSrc: Oral   SpO2: 98%     Patient was assessed at bedside labs and imaging were ordered. Here today I reviewed all labs and imaging all within normal limits. At this point I feel the patient has mild viral illness.   Patient is instructed use Tylenol Motrin for any pain or fevers. He is instructed to follow-up with a primary care physician and do so within the next 1 to 2 days and return to the nearest emergency room immediately for any new or worsening complaints. This was discussed with the patient at bedside who understood and agreed to the plan. Patient is subsequently discharged home in stable condition. The patient has what appears to be a minor viral illness. Patient is instructed to use over-the-counter cough cold and flu medication. He is instructed use Tylenol Motrin for any pain. He is instructed to follow-up with his primary care physician to do so within the next 1 to 2 days. He is instructed return to the nearest emergency room immediately for any new or worsening complaints. CRITICAL CARE:   None    CONSULTS:  None    PROCEDURES:  None    FINAL IMPRESSION      1.  Viral illness          DISPOSITION/PLAN   Discharge    PATIENT REFERRED TO:  DO Kris Yip  [address]  [phone]    Call in 1 day      DISCHARGE MEDICATIONS:  Discharge Medication List as of 8/24/2022  2:09 AM          (Please note that portions of this note were completed with a voice recognition program.  Efforts were made to edit the dictations but occasionally words are mis-transcribed.)    Nestor Castellanos, [address], DO  08/24/22 1637

## 2022-10-11 ENCOUNTER — APPOINTMENT (OUTPATIENT)
Dept: GENERAL RADIOLOGY | Age: 62
End: 2022-10-11
Payer: OTHER GOVERNMENT

## 2022-10-11 ENCOUNTER — HOSPITAL ENCOUNTER (EMERGENCY)
Age: 62
Discharge: HOME | End: 2022-10-11
Attending: STUDENT IN AN ORGANIZED HEALTH CARE EDUCATION/TRAINING PROGRAM
Payer: OTHER GOVERNMENT

## 2022-10-11 VITALS
DIASTOLIC BLOOD PRESSURE: 88 MMHG | OXYGEN SATURATION: 98 % | HEART RATE: 70 BPM | BODY MASS INDEX: 23.19 KG/M2 | HEIGHT: 73 IN | SYSTOLIC BLOOD PRESSURE: 136 MMHG | WEIGHT: 175 LBS | TEMPERATURE: 97.7 F | RESPIRATION RATE: 15 BRPM

## 2022-10-11 DIAGNOSIS — S60.041A CONTUSION OF RIGHT RING FINGER WITHOUT DAMAGE TO NAIL, INITIAL ENCOUNTER: ICD-10-CM

## 2022-10-11 DIAGNOSIS — S50.01XA CONTUSION OF RIGHT ELBOW, INITIAL ENCOUNTER: ICD-10-CM

## 2022-10-11 DIAGNOSIS — S70.01XA CONTUSION OF RIGHT HIP, INITIAL ENCOUNTER: Primary | ICD-10-CM

## 2022-10-11 PROCEDURE — 99283 EMERGENCY DEPT VISIT LOW MDM: CPT | Performed by: STUDENT IN AN ORGANIZED HEALTH CARE EDUCATION/TRAINING PROGRAM

## 2022-10-11 PROCEDURE — 73140 X-RAY EXAM OF FINGER(S): CPT

## 2022-10-11 PROCEDURE — 73502 X-RAY EXAM HIP UNI 2-3 VIEWS: CPT

## 2022-10-11 PROCEDURE — 6370000000 HC RX 637 (ALT 250 FOR IP): Performed by: STUDENT IN AN ORGANIZED HEALTH CARE EDUCATION/TRAINING PROGRAM

## 2022-10-11 PROCEDURE — 73080 X-RAY EXAM OF ELBOW: CPT

## 2022-10-11 RX ORDER — HYDROCODONE BITARTRATE AND ACETAMINOPHEN 5; 325 MG/1; MG/1
1 TABLET ORAL ONCE
Status: COMPLETED | OUTPATIENT
Start: 2022-10-11 | End: 2022-10-11

## 2022-10-11 RX ADMIN — HYDROCODONE BITARTRATE AND ACETAMINOPHEN 1 TABLET: 5; 325 TABLET ORAL at 05:33

## 2022-10-11 ASSESSMENT — PAIN SCALES - GENERAL: PAINLEVEL_OUTOF10: 8

## 2022-10-11 ASSESSMENT — PAIN - FUNCTIONAL ASSESSMENT: PAIN_FUNCTIONAL_ASSESSMENT: 0-10

## 2022-10-11 NOTE — Clinical Note
Ramone Yadav was seen and treated in our emergency department on 10/11/2022. He may return to work on 10/13/2022. If you have any questions or concerns, please don't hesitate to call.       Robert Augustin MD

## 2022-10-11 NOTE — ED PROVIDER NOTES
[facility] [address] [address] EMERGENCY DEPT  EMERGENCY DEPARTMENT     Pt Name: Brett Ramirez  MRN: [id number]  [place] 1960  Date of evaluation: 10/11/2022  Provider: Neftaly Rahman MD,     [address]       Chief Complaint   Patient presents with    Hand Pain     right    Hip Pain     right       HISTORY OF PRESENT ILLNESS    Brett Ramirez is a 58 y.o. male who presents to the emergency department from home with chief complaint of right ring finger pain, right hip pain, right elbow pain. Patient reports he was moving a dresser a few days ago when he slipped and fell. He reports a dresser landed on his right hip. He has been ambulatory for the past few days following the injury however reports continued pain. He reports swelling of his right ring finger as well as some mild swelling to the right elbow. He reports a contusion to his right hip. He denies head trauma or loss of consciousness. He denies neck pain. He denies back pain. He denies focal neurological deficits. Triage notes and Nursing notes were reviewed by myself. Any discrepancies are addressed above.     PAST MEDICAL HISTORY     Past Medical History:   Diagnosis Date    Bipolar 1 disorder ([facility]     COPD (chronic obstructive pulmonary disease) ([facility]     Hypertension     Lumbar radiculopathy        SURGICAL HISTORY       Past Surgical History:   Procedure Laterality Date    COLONOSCOPY Left 6/21/2019    COLONOSCOPY DIAGNOSTIC performed by Keysha Aaron MD at [place] Right     HIP SURGERY Right 3/15/2021    right hip steroid injection no sedation performed by Ariel Crowell MD at [facility] Right 2/8/2021    RIGHT TFLESI @ L4 and L5. performed by Ariel Crowell MD at [facility] Right 4/20/2021    LESI @L4#1 right performed by Ariel Crowell MD at [facility] Right 6/22/2021 right LESI # 2 @ L3 performed by Mirlande Mario MD at [address] Left 6/21/2019    EGD BIOPSY performed by Laura Myrick MD at [address] [facility]       Previous Medications    CALCIUM POLYCARBOPHIL (FIBER) 625 MG TABS    Take 1 tablet by mouth daily    FLUOXETINE (PROZAC) 20 MG CAPSULE    Take 20 mg by mouth daily. GABAPENTIN (NEURONTIN) 300 MG CAPSULE    take 1 capsule by mouth twice a day    IBUPROFEN (ADVIL;MOTRIN) 800 MG TABLET    Take 1 tablet by mouth every 8 hours as needed for Pain    LACTASE (LACTAID) 3000 UNITS TABLET    Take 1 tablet by mouth 3 times daily (with meals)    LIDOCAINE (LIDODERM) 5 %    Place 1 patch onto the skin daily 12 hours on, 12 hours off. METOPROLOL (TOPROL-XL) 50 MG XL TABLET    Take 50 mg by mouth daily. TAMSULOSIN (FLOMAX) 0.4 MG CAPSULE    Take 1 capsule by mouth daily Take one capsule daily to facilitate passage of ureteral stone       ALLERGIES     Patient has no known allergies. FAMILY HISTORY       Family History   Problem Relation Age of Onset    Diabetes Mother     Diabetes Father         SOCIAL HISTORY       Social History     Socioeconomic History    Marital status: Single   Tobacco Use    Smoking status: Every Day     Packs/day: 0.25     Years: 30.00     Pack years: 7.50     Types: Cigarettes     Start date: 4/27/1983    Smokeless tobacco: Never   Vaping Use    Vaping Use: Never used   Substance and Sexual Activity    Alcohol use: Not Currently     Comment: occasionally    Drug use: No     Comment: hx of    Sexual activity: Yes     Partners: Female       REVIEW OF SYSTEMS     Review of Systems  - CONSTITUTIONAL: Denies weight loss, fever and chills. - HEENT: Denies changes in vision and hearing.    -   RESPIRATORY: Denies SOB and cough. - CV: Denies palpitations and CP.       - GI: Denies abdominal pain, nausea, vomiting and diarrhea.   Denies fecal incontinence or retention      - : Denies urinary incontinence or retention      - MSK: As per HPI      - SKIN: Reports right hip contusion    -   NEUROLOGICAL: Denies headache and syncope. - PSYCHIATRIC: Denies recent changes in mood. Denies anxiety and depression. Except as noted above the remainder of the review of systems was reviewed and is. PHYSICAL EXAM    (up to 7 for level 4, 8 or more for level 5)     ED Triage Vitals [10/11/22 0216]   BP Temp Temp Source Heart Rate Resp SpO2 Height Weight   136/88 97.7 °F (36.5 °C) Oral 70 15 98 % 6' 1\" (1.854 m) 175 lb (79.4 kg)       Physical Exam  Primary survey: Airway patent, no tracheal deviation. Breath sounds equal b/l, equal chest rise, no chest wall tenderness or crepitus. Pulses equal in all extremities, normotensive and normal heart rate, no gross hemorrhage. No gross focal neuro deficit, GCS 15. Mild swelling to the right ring finger, mild swelling to the right elbow, 15 cm x 15 cm contusion to the right hip otherwise no gross abnormalities on exposure. Non-tender spinous processes and no step-offs appreciated. Normal rectal tone. Secondary survey: No scalp lacerations, skull step-offs or cephalohematoma. PEARLA. No hemotympanum or Ba signs. No septal hematoma. No jaw crepitus or oropharyngeal abnormality. No bruising or lacerations to neck. No contusion to chest. Abdomen soft, nontender, nondistended without Jeet´s sign. Pelvis stable. No gross abnormalities to extremities with exception of previously described. Mild tenderness to the right ring finger, right hip contusion, right elbow otherwise no tenderness to extremities. FROM of all extremities. All extremities neurovascularly intact.      DIAGNOSTIC RESULTS     EKG:(none if blank)  All EKG's are interpreted [facility] who either signs or Co-signs this chart in the absence of a cardiologist.      RADIOLOGY: (none if blank)   Interpretation per the Radiologistbelow, if available at the time of this note:    XR FINGER RIGHT (MIN 2 VIEWS)   Final Result   Impression:   Negative for acute osseous abnormality. This document has been electronically signed by: Ana Paula Jaimes MD on    10/11/2022 04:36 AM      XR ELBOW RIGHT (MIN 3 VIEWS)   Final Result   Impression:   Negative for acute osseous abnormality. This document has been electronically signed by: Ana Paula Jaimes MD on    10/11/2022 04:38 AM      XR HIP 2-3 VW W PELVIS RIGHT   Final Result   Impression:   Negative for acute osseous abnormality. This document has been electronically signed by: Ana Paula Jaimes MD on    10/11/2022 04:38 AM          LABS:  Labs Reviewed - No data to display    All other labs were within normal range or not returned as of this dictation. Please note, any cultures that may have been sent were not resulted at the time of this patient visit. EMERGENCY DEPARTMENT COURSE andMedical Decision Making:     MDM  /  ED Course as of 10/11/22 0511   Tue Oct 11, 2022   0507 Contusions and mild swelling to the right elbow as well as right ring finger otherwise unremarkable examination. Full range of motion. Has been bearing weight without difficulty since the time of the accident. X-ray of the hip, right elbow, right finger negative for acute fractures. Patient informed. Supportive care was discussed. Patient requested a work note which was provided. PCP and orthopedic surgery follow-up [AM]      ED Course User Index  [AM] Gibran Talamantes MD     No snuffbox tenderness. No other traumatic injury identified  Strict returnprecautions and follow up instructions were discussed with the patient with which the patient agrees    ED Medications administered this visit:  Medications - No data to display      Procedures: (None if blank)       CLINICAL       1. Contusion of right hip, initial encounter    2. Contusion of right ring finger without damage to nail, initial encounter    3. Contusion of right elbow, initial encounter          DISPOSITION/PLAN   DISPOSITION    Home with PCP and orthopedic surgery follow-up    PATIENT REFERRED TO:  Berenice Younger DO  [address] [facility] [address]     In 3 days      [address]  [phone]  In 3 days          (Please note that portions of this note were completed with a voice recognition program.  Efforts were made to edit the dictations but occasionallywords are mis-transcribed. )      Tylor Corcoran MD, (electronically signed)  Attending Physician, Emergency Department         Tylor Corcoran MD  10/11/22 4537

## 2022-10-11 NOTE — ED NOTES
ED nurse-to-nurse bedside report    Chief Complaint   Patient presents with    Hand Pain     right    Hip Pain     right      LOC: alert and orientated to name, place, date  Vital signs   Vitals:    10/11/22 0216   BP: 136/88   Pulse: 70   Resp: 15   Temp: 97.7 °F (36.5 °C)   TempSrc: Oral   SpO2: 98%   Weight: 175 lb (79.4 kg)   Height: 6' 1\" (1.854 m)      Pain:    Pain Interventions: none  Pain Goal: 2  Oxygen: No    Current needs required room air   Telemetry: No  LDAs:    Continuous Infusions:   Mobility: Requires assistance * 1  Hwang Fall Risk Score: No flowsheet data found. Fall Interventions: side rails, call light  Report given to:  Roxie HELLER       [employer], RN  10/11/22 8850

## 2022-10-11 NOTE — ED TRIAGE NOTES
Pt to the ED via lobby with complaint of right hand and hip pain that happened on Sunday afternoon when a dresser fell on pt. Pt stated he was moving a dresser and tripped and fell with it landing on him. Pt stated he has been taking tylenol for pain with little relief. Pt alert and oriented x3. Pt denies LOC.

## 2022-10-24 ENCOUNTER — PROCEDURE VISIT (OUTPATIENT)
Dept: UROLOGY | Age: 62
End: 2022-10-24
Payer: OTHER GOVERNMENT

## 2022-10-24 VITALS — HEIGHT: 73 IN | RESPIRATION RATE: 18 BRPM | WEIGHT: 166 LBS | BODY MASS INDEX: 22 KG/M2

## 2022-10-24 DIAGNOSIS — N40.1 BENIGN LOCALIZED PROSTATIC HYPERPLASIA WITH LOWER URINARY TRACT SYMPTOMS (LUTS): Primary | ICD-10-CM

## 2022-10-24 PROCEDURE — 52000 CYSTOURETHROSCOPY: CPT | Performed by: UROLOGY

## 2022-10-24 NOTE — PROGRESS NOTES
MD [facility] / New Patient Visit    Patient:  Ross Milligan  YOB: 1960  Date: 10/24/2022  Consult requested from Jey Anand DO     HISTORY OF PRESENT ILLNESS:   The patient is a 58 y.o. male who presents today for follow-up for the following problem(s): ED  Overall the problem(s) : show no change. Associated Symptoms: No dysuria, gross hematuria. Pain Severity:      Today visit:   10/24/22   ED chornic problem, stable on Trimix. Had difficulty with bruising.  - He is interetested in IPP - literature given    BPH with LUTs - chronic unstable problem with worsening frequency urgency and weak stream.  On Tamsulosin, does not like taking it cause it causes decreased libido and retrograde ejaculation. Discussed risks and benefits. Cystoscopy Operative Note  Surgeon: Faye Meza MD   Anesthesia: Urethral 2%  Indications: BPH  Position: supine  Findings:   The patient was prepped and draped in the usual sterile fashion. The flexible cystoscope was advanced through the urethra and into the bladder. The bladder was thoroughly inspected and the following was noted:    Residual Urine: Significant  Urethra: No abnormalities of the urethra are noted. Prostate: Medium gland (< 80 gm) Complete obstruction by lateral lobe of prostate. Bladder: No tumors or CIS noted. No bladder diverticulum. Moderate trabeculation noted. Ureters: Clear efflux from both ureters. Orifices with normal configuration and location. The cystoscope was removed. The patient tolerated the procedure well. Plan: good candidate for Urolift      Summary of old records:   (Patient's old records, notes and chart reviewed and summarized above.)  Ross Milligan f/u today for Erectile Dysfunction. Previous patient of Dr. Patricia Dotson.    He remains on Trimix injections which are working well for him, however he is interested in vascular reconstructive surgery for his ED. Unable to achieve erection without Trimix injection. He comes in today by himself. Hx is obtained from the patient and medical record. Last several PSA's:  Lab Results   Component Value Date    PSA 1.24 10/21/2021    PSA 1.20 02/16/2021    PSA 1.08 (H) 11/14/2018       Last total testosterone:  No results found for: TESTOSTERONE    Urinalysis today:  No results found for this visit on 10/24/22.         Last BUN and creatinine:  Lab Results   Component Value Date    BUN 7 08/24/2022     Lab Results   Component Value Date    CREATININE 1.0 08/24/2022       Imaging Reviewed during this Office Visit:   (results were independently reviewed by physician and radiology report verified)    PAST MEDICAL, FAMILY AND SOCIAL HISTORY:  Past Medical History:   Diagnosis Date    Bipolar 1 disorder ([facility]     COPD (chronic obstructive pulmonary disease) ([facility] [address] [address]     Hypertension     Lumbar radiculopathy      Past Surgical History:   Procedure Laterality Date    COLONOSCOPY Left 6/21/2019    COLONOSCOPY DIAGNOSTIC performed by Zi Torres MD at [place] Right     HIP SURGERY Right 3/15/2021    right hip steroid injection no sedation performed by Gómez Lerner MD at [facility] Right 2/8/2021    RIGHT TFLESI @ L4 and L5. performed by Gómez Lerner MD at [facility] Right 4/20/2021    LESI @L4#1 right performed by Gómez Lerner MD at [facility] Right 6/22/2021    right LESI # 2 @ L3 performed by Gómez Lerner MD at [address] [facility] Left 6/21/2019    EGD BIOPSY performed by Zi Torres MD at [facility]     Family History   Problem Relation Age of Onset    Diabetes Mother     Diabetes Father      Outpatient Medications Marked as Taking for the 10/24/22 encounter (Procedure visit) with Lynnette Gould MD   Medication Sig Dispense Refill    lidocaine (LIDODERM) 5 % Place 1 patch onto the skin daily 12 hours on, 12 hours off. 30 patch 0    ibuprofen (ADVIL;MOTRIN) 800 MG tablet Take 1 tablet by mouth every 8 hours as needed for Pain 30 tablet 0    gabapentin (NEURONTIN) 300 MG capsule take 1 capsule by mouth twice a day 60 capsule 0    lactase (LACTAID) 3000 units tablet Take 1 tablet by mouth 3 times daily (with meals) 30 tablet 0    Calcium Polycarbophil (FIBER) 625 MG TABS Take 1 tablet by mouth daily 14 tablet 0    FLUoxetine (PROZAC) 20 MG capsule Take 20 mg by mouth daily. metoprolol (TOPROL-XL) 50 MG XL tablet Take 50 mg by mouth daily. Lisinopril  Social History     Tobacco Use   Smoking Status Every Day    Packs/day: 0.25    Years: 30.00    Pack years: 7.50    Types: Cigarettes    Start date: 4/27/1983   Smokeless Tobacco Never       Social History     Substance and Sexual Activity   Alcohol Use Not Currently    Comment: occasionally       REVIEW OF SYSTEMS:  Constitutional: negative  Eyes: negative  Respiratory: negative  Cardiovascular: negative  Gastrointestinal: negative  Musculoskeletal: negative  Genitourinary: negative  Skin: negative   Neurological: negative  Hematological/Lymphatic: negative  Psychological: negative    Physical Exam:    This a 58 y.o. male   Vitals:    10/24/22 1419   Resp: 18     Constitutional: Patient in no acute distress   Neuro: alert and oriented to person place and time. Psych: Mood and affect normal.  Head: atraumatic normocephalic  Eyes: EOMi  HEENT: neck supple, trachea midline  Lungs: Respiratory effort normal  Cardiovascular:  Normal peripheral pulses  Abdomen: Soft, non-tender, non-distended, No CVA  Bladder: non-tender and not distended. FROMx4, no cyanosis clubbing edema  Skin: warm and dry      Assessment and Plan      1.  Benign localized prostatic hyperplasia with lower urinary tract symptoms (LUTS) Plan:      No follow-ups on file. Refill Trimix for ED - having difficulty with erections, and trimix causing bruising. He would like IPP (). - Risks benefits and alternative procedures are explained, informed consent is obtained, and the patient elects to proceed. Flomax - will start, monitor symptoms. - offered cystoscopy for BPH - good candiate for Urolift, he wants to continue Flomax.

## 2022-10-31 ENCOUNTER — TELEPHONE (OUTPATIENT)
Dept: UROLOGY | Age: 62
End: 2022-10-31

## 2022-10-31 DIAGNOSIS — N52.01 ERECTILE DYSFUNCTION DUE TO ARTERIAL INSUFFICIENCY: Primary | ICD-10-CM

## 2022-10-31 DIAGNOSIS — Z01.818 PRE-OP TESTING: ICD-10-CM

## 2022-10-31 NOTE — TELEPHONE ENCOUNTER
Patient is scheduled for surgery on 12/19/22 with Dr Stephan Severs. Surgery consent on arrival. Patient to do pre op testing on 12/5/22. Surgery instructions gone over verbally and mailed to patient. Patient will have an adult over the age of 25 with them at discharge and 24 hours after procedure. Brianda Nogueira notified. Patient's surgery is rescheduled to 2/20/23. Patient to do pre op urine culture on 2/6/23. Order mailed to patient along with surgery instructions. Patient notified. Brianda Nogueira notified.

## 2022-10-31 NOTE — TELEPHONE ENCOUNTER
SURGERY 826  [address] [place] [address] [address] [facility] [address] JORGE YOUNG AM OFF[place]GG II.JAYDE, [address]      Phone *[phone] *[phone]   Surgical Scheduling Direct Line Phone *[phone] Fax *[phone]      Cely Alexandre 1960 male    [address]  [address] [facility]  [address] [facility]   Marital Status:          Home Phone: [phone]      Cell Phone:    Telephone Information:   Mobile [phone]          Surgeon: Dr. Ghulam Champion Surgery Date: 12/19/22   Time: 7:30 AM    Procedure: Penile prosthesis placement    Diagnosis: Erectile dysfunction     Important Medical History:  In [facility]    Special Inst/Equip: Guerita Pat with [address] [place] [address] notified    CPT Codes:    40229  Latex Allergy: No     Cardiac Device:  No    Anesthesia:  General          Admission Type:  Same Day                        Admit Prior to Day of Surgery: No    Case Location:  Main OR            Preadmission Testing:  Phone Call          PAT Date and Time:______________________________________________________    PAT Confirmation #: ______________________________________________________    Post Op Visit: ___________________________________________________________    Need Preop Cardiac Clearance: No    Does Patient have Cardiologist/physician?      None    Surgery Confirmation #: __________________________________________________    : ________________________   Date: __________________________     [employer] Name: [place]

## 2022-10-31 NOTE — TELEPHONE ENCOUNTER
DO NOT TAKE ASPIRIN,  FISH OIL, MOBIC,COUMADIN, IBUPROFEN, MOTRIN-LIKE DRUGS AND ANY MULTIVITAMINS OR OVER THE COUNTER SUPPLEMENTS 14 DAYS PRIOR TO SURGERY. MUST HAVE AN ADULT OVER THE AGE OF 18 WITH YOU AT THE TIME OF THE DISCHARGE AND WITH YOU AT HOME AFTER THE PROCEDURE FOR 24 HOURS          Yosef Siddiqi 1960 Diagnosis:     Surgical Physician: Dr. Hollie Platt have been scheduled for the procedure marked below:      Surgery: Penile prosthesis placement         Date: 2/20/23     Anesthesia: Anesthesiologist (General/Spinal)     Place of Service: [address] [place] [address] Second Floor Same Day Surgery         Arrive to same day surgery by:  1:00 PM  (Surgery time is subject to change)      INSTRUCTIONS AS MARKED BELOW:    1.  DO NOT eat or drink anything after midnight before surgery. 2.  We prefer you shower or bathe with an antibacterial soap (Dial) the morning of surgery. 3  Please bring a current medication list, photo ID and insurance card(s) with you  4. Okay to take Tylenol  5. If you take Glucophage, Metformin or Janumet, hold 48-hours prior to surgery  6  Take blood pressure or heart medication as directed, if taken in the morning take with a small sip of water  7. The office will call you in 1-2 days after your procedure to schedule a follow up. DATE SENSITIVE TESTING-DO ON THE DATE LISTED *WALK IN *NO APPOINTMENT    DO PRE OP URINE CULTURE ON 2/6/23. ORDERS INCLUDED.         Date: 10/31/2022

## 2022-11-28 ENCOUNTER — TRANSCRIBE ORDERS (OUTPATIENT)
Dept: ADMINISTRATIVE | Age: 62
End: 2022-11-28

## 2022-11-28 DIAGNOSIS — R63.4 WEIGHT LOSS: Primary | ICD-10-CM

## 2022-11-28 DIAGNOSIS — R10.32 LLQ PAIN: ICD-10-CM

## 2022-11-28 DIAGNOSIS — R10.12 LUQ PAIN: ICD-10-CM

## 2022-12-02 ENCOUNTER — HOSPITAL ENCOUNTER (OUTPATIENT)
Age: 62
Discharge: HOME | End: 2022-12-02
Payer: OTHER GOVERNMENT

## 2022-12-02 DIAGNOSIS — N52.01 ERECTILE DYSFUNCTION DUE TO ARTERIAL INSUFFICIENCY: ICD-10-CM

## 2022-12-02 DIAGNOSIS — Z01.818 PRE-OP TESTING: ICD-10-CM

## 2022-12-02 PROCEDURE — 87086 URINE CULTURE/COLONY COUNT: CPT

## 2022-12-04 LAB — URINE CULTURE, ROUTINE: NORMAL

## 2022-12-05 ENCOUNTER — HOSPITAL ENCOUNTER (OUTPATIENT)
Age: 62
Discharge: HOME | End: 2022-12-05
Payer: OTHER GOVERNMENT

## 2022-12-05 DIAGNOSIS — N52.01 ERECTILE DYSFUNCTION DUE TO ARTERIAL INSUFFICIENCY: ICD-10-CM

## 2022-12-05 DIAGNOSIS — Z01.818 PRE-OP TESTING: ICD-10-CM

## 2022-12-05 LAB
ANION GAP SERPL CALCULATED.3IONS-SCNC: 10 MEQ/L (ref 8–16)
BASOPHILS # BLD: 1.2 %
BASOPHILS ABSOLUTE: 0.1 THOU/MM3 (ref 0–0.1)
BUN BLDV-MCNC: 7 MG/DL (ref 7–22)
CALCIUM SERPL-MCNC: 9.7 MG/DL (ref 8.5–10.5)
CHLORIDE BLD-SCNC: 98 MEQ/L (ref 98–111)
CO2: 25 MEQ/L (ref 23–33)
CREAT SERPL-MCNC: 0.8 MG/DL (ref 0.4–1.2)
EKG ATRIAL RATE: 69 BPM
EKG P AXIS: 67 DEGREES
EKG P-R INTERVAL: 156 MS
EKG Q-T INTERVAL: 382 MS
EKG QRS DURATION: 84 MS
EKG QTC CALCULATION (BAZETT): 409 MS
EKG R AXIS: 57 DEGREES
EKG T AXIS: 73 DEGREES
EKG VENTRICULAR RATE: 69 BPM
EOSINOPHIL # BLD: 14 %
EOSINOPHILS ABSOLUTE: 0.7 THOU/MM3 (ref 0–0.4)
ERYTHROCYTE [DISTWIDTH] IN BLOOD BY AUTOMATED COUNT: 13.5 % (ref 11.5–14.5)
ERYTHROCYTE [DISTWIDTH] IN BLOOD BY AUTOMATED COUNT: 43.3 FL (ref 35–45)
GFR SERPL CREATININE-BSD FRML MDRD: > 60 ML/MIN/1.73M2
GLUCOSE BLD-MCNC: 89 MG/DL (ref 70–108)
HCT VFR BLD CALC: 44.4 % (ref 42–52)
HEMOGLOBIN: 15.1 GM/DL (ref 14–18)
IMMATURE GRANS (ABS): 0.01 THOU/MM3 (ref 0–0.07)
IMMATURE GRANULOCYTES: 0.2 %
LYMPHOCYTES # BLD: 41.3 %
LYMPHOCYTES ABSOLUTE: 2.1 THOU/MM3 (ref 1–4.8)
MCH RBC QN AUTO: 29.7 PG (ref 26–33)
MCHC RBC AUTO-ENTMCNC: 34 GM/DL (ref 32.2–35.5)
MCV RBC AUTO: 87.2 FL (ref 80–94)
MONOCYTES # BLD: 9.8 %
MONOCYTES ABSOLUTE: 0.5 THOU/MM3 (ref 0.4–1.3)
NUCLEATED RED BLOOD CELLS: 0 /100 WBC
PLATELET # BLD: 297 THOU/MM3 (ref 130–400)
PMV BLD AUTO: 9.5 FL (ref 9.4–12.4)
POTASSIUM SERPL-SCNC: 4.4 MEQ/L (ref 3.5–5.2)
RBC # BLD: 5.09 MILL/MM3 (ref 4.7–6.1)
SEG NEUTROPHILS: 33.5 %
SEGMENTED NEUTROPHILS ABSOLUTE COUNT: 1.7 THOU/MM3 (ref 1.8–7.7)
SODIUM BLD-SCNC: 133 MEQ/L (ref 135–145)
WBC # BLD: 5 THOU/MM3 (ref 4.8–10.8)

## 2022-12-05 PROCEDURE — 36415 COLL VENOUS BLD VENIPUNCTURE: CPT

## 2022-12-05 PROCEDURE — 93010 ELECTROCARDIOGRAM REPORT: CPT | Performed by: INTERNAL MEDICINE

## 2022-12-05 PROCEDURE — 93005 ELECTROCARDIOGRAM TRACING: CPT

## 2022-12-05 PROCEDURE — 80048 BASIC METABOLIC PNL TOTAL CA: CPT

## 2022-12-05 PROCEDURE — 85025 COMPLETE CBC W/AUTO DIFF WBC: CPT

## 2022-12-09 ENCOUNTER — PREP FOR PROCEDURE (OUTPATIENT)
Dept: UROLOGY | Age: 62
End: 2022-12-09

## 2022-12-16 RX ORDER — SODIUM CHLORIDE 0.9 % (FLUSH) 0.9 %
5-40 SYRINGE (ML) INJECTION EVERY 12 HOURS SCHEDULED
Status: CANCELLED | OUTPATIENT
Start: 2022-12-16

## 2022-12-16 RX ORDER — SODIUM CHLORIDE 9 MG/ML
INJECTION, SOLUTION INTRAVENOUS PRN
Status: CANCELLED | OUTPATIENT
Start: 2022-12-16

## 2022-12-16 RX ORDER — SODIUM CHLORIDE 0.9 % (FLUSH) 0.9 %
5-40 SYRINGE (ML) INJECTION PRN
Status: CANCELLED | OUTPATIENT
Start: 2022-12-16

## 2022-12-27 NOTE — TELEPHONE ENCOUNTER
SURGERY 8[address]  [address] [facility] [address] [facility], [address]      Phone *[phone] *[phone]   Surgical Scheduling Direct Line Phone *[phone] Fax *[phone]      Aletha Nunez 1960 male    [address]  [address]  [address]   Marital Status:          Home Phone: [phone]      Cell Phone:    Telephone Information:   Mobile [phone]          Surgeon: Dr. La Nena Abel Surgery Date: 2/20/23   Time: 3:00 PM    Procedure: Penile prosthesis placement    Diagnosis: Erectile dysfunction      Important Medical History:  In [employer]    Special Inst/Equip: Regular    CPT Codes:    21344  Latex Allergy: No     Cardiac Device:  No    Anesthesia:  General          Admission Type:  Same Day                        Admit Prior to Day of Surgery: No    Case Location:  Main OR            Preadmission Testing:  Phone Call          PAT Date and Time:______________________________________________________    PAT Confirmation #: ______________________________________________________    Post Op Visit: ___________________________________________________________    Need Preop Cardiac Clearance: No    Does Patient have Cardiologist/physician?      None    Surgery Confirmation #: __________________________________________________    : ________________________   Date: __________________________     [employer] Name: [place]

## 2023-01-13 LAB — PROSTATE SPECIFIC ANTIGEN: 1.75 NG/ML

## 2023-01-18 ENCOUNTER — HOSPITAL ENCOUNTER (OUTPATIENT)
Dept: CT IMAGING | Age: 63
Discharge: HOME | End: 2023-01-18
Payer: OTHER GOVERNMENT

## 2023-01-18 DIAGNOSIS — R63.4 WEIGHT LOSS: ICD-10-CM

## 2023-01-18 DIAGNOSIS — R10.12 LUQ PAIN: ICD-10-CM

## 2023-01-18 DIAGNOSIS — R10.32 LLQ PAIN: ICD-10-CM

## 2023-01-18 LAB — POC CREATININE WHOLE BLOOD: 0.9 MG/DL (ref 0.5–1.2)

## 2023-01-18 PROCEDURE — 82565 ASSAY OF CREATININE: CPT

## 2023-01-18 PROCEDURE — 6360000004 HC RX CONTRAST MEDICATION: Performed by: INTERNAL MEDICINE

## 2023-01-18 PROCEDURE — 74177 CT ABD & PELVIS W/CONTRAST: CPT

## 2023-01-18 RX ADMIN — IOPAMIDOL 80 ML: 755 INJECTION, SOLUTION INTRAVENOUS at 08:29

## 2023-01-26 ENCOUNTER — PROCEDURE VISIT (OUTPATIENT)
Dept: NEUROLOGY | Age: 63
End: 2023-01-26
Payer: OTHER GOVERNMENT

## 2023-01-26 DIAGNOSIS — R20.0 RIGHT ARM NUMBNESS: ICD-10-CM

## 2023-01-26 DIAGNOSIS — M54.12 CERVICAL RADICULOPATHY: ICD-10-CM

## 2023-01-26 DIAGNOSIS — G56.01 RIGHT CARPAL TUNNEL SYNDROME: ICD-10-CM

## 2023-01-26 DIAGNOSIS — M79.601 RIGHT ARM PAIN: Primary | ICD-10-CM

## 2023-01-26 PROCEDURE — 95886 MUSC TEST DONE W/N TEST COMP: CPT | Performed by: PSYCHIATRY & NEUROLOGY

## 2023-01-26 PROCEDURE — 95909 NRV CNDJ TST 5-6 STUDIES: CPT | Performed by: PSYCHIATRY & NEUROLOGY

## 2023-01-30 ENCOUNTER — HOSPITAL ENCOUNTER (OUTPATIENT)
Dept: CT IMAGING | Age: 63
Discharge: HOME | End: 2023-01-30
Payer: MEDICARE

## 2023-01-30 DIAGNOSIS — Z72.0 TOBACCO USE: ICD-10-CM

## 2023-01-30 PROCEDURE — 71271 CT THORAX LUNG CANCER SCR C-: CPT

## 2023-01-31 ENCOUNTER — PROCEDURE VISIT (OUTPATIENT)
Dept: NEUROLOGY | Age: 63
End: 2023-01-31
Payer: OTHER GOVERNMENT

## 2023-01-31 DIAGNOSIS — G62.9 NEUROPATHY: ICD-10-CM

## 2023-01-31 DIAGNOSIS — R20.0 BILATERAL LEG NUMBNESS: Primary | ICD-10-CM

## 2023-01-31 DIAGNOSIS — M54.16 LUMBAR RADICULOPATHY: ICD-10-CM

## 2023-01-31 PROCEDURE — 95886 MUSC TEST DONE W/N TEST COMP: CPT | Performed by: PSYCHIATRY & NEUROLOGY

## 2023-01-31 PROCEDURE — 95910 NRV CNDJ TEST 7-8 STUDIES: CPT | Performed by: PSYCHIATRY & NEUROLOGY

## 2023-02-02 ENCOUNTER — HOSPITAL ENCOUNTER (OUTPATIENT)
Age: 63
Discharge: HOME | End: 2023-02-02
Payer: OTHER GOVERNMENT

## 2023-02-02 DIAGNOSIS — N52.01 ERECTILE DYSFUNCTION DUE TO ARTERIAL INSUFFICIENCY: ICD-10-CM

## 2023-02-02 DIAGNOSIS — N40.1 BENIGN LOCALIZED PROSTATIC HYPERPLASIA WITH LOWER URINARY TRACT SYMPTOMS (LUTS): ICD-10-CM

## 2023-02-02 DIAGNOSIS — Z01.818 PRE-OP TESTING: ICD-10-CM

## 2023-02-02 PROCEDURE — 87086 URINE CULTURE/COLONY COUNT: CPT

## 2023-02-03 ENCOUNTER — PREP FOR PROCEDURE (OUTPATIENT)
Dept: UROLOGY | Age: 63
End: 2023-02-03

## 2023-02-03 LAB
BACTERIA UR CULT: ABNORMAL
ORGANISM: ABNORMAL

## 2023-02-06 ENCOUNTER — TELEPHONE (OUTPATIENT)
Dept: UROLOGY | Age: 63
End: 2023-02-06

## 2023-02-06 NOTE — TELEPHONE ENCOUNTER
Please review urine culture on 2/2/23. Surgery with Dr Rosina Joyce on 2/20/23 for a Penile prosthesis placement Thanks.

## 2023-02-10 NOTE — FLOWSHEET NOTE
Follow all instructions given by your physician  NPO after midnight   Sips of water am of surgery with allowed medications  Bring insurance info and 's license  Wear comfortable clean, loose fitting clothing  No jewelry or contact lenses to be worn day of surgery  No glue on dentures morning of surgery;you will be asked to remove them for surgery. Case for glasses. Shower night before and morning of surgery with a liquid antibacterial soap, dry with fresh clean towel; no lotions, creams or powder. Clean sheets and pillow case on bed night before surgery  Bring medications in original bottles      Please refer to the SSI-Surgical Site Infection Flyer you hopefully received in the mail-together we can prevent infections; signs and symptoms reviewed. When discharged be sure you understand how to care for your wound and that you have the phone # to call if you have any concerns or questions about your wound.  needed at discharge and someone over 18 to stay with you for 24 hours overnight (surgery may be cancelled if you don't have this)  Report to [facility] on 2nd floor  If you would become ill prior to surgery, please call the surgeon  May have a visitor with you, we request that you limit to 2 visitors in pre-op area  Masks are recommended but not required, new masks at entrance desk  Call [phone] for any questions    Covid questionnaire Complete; Patient negative for symptoms or exposure. See documentation.

## 2023-02-15 ENCOUNTER — HOSPITAL ENCOUNTER (OUTPATIENT)
Dept: PET IMAGING | Age: 63
Discharge: HOME | End: 2023-02-15
Payer: OTHER GOVERNMENT

## 2023-02-15 DIAGNOSIS — R91.1 SOLITARY PULMONARY NODULE: ICD-10-CM

## 2023-02-15 PROCEDURE — A9552 F18 FDG: HCPCS | Performed by: INTERNAL MEDICINE

## 2023-02-15 PROCEDURE — 3430000000 HC RX DIAGNOSTIC RADIOPHARMACEUTICAL: Performed by: INTERNAL MEDICINE

## 2023-02-15 PROCEDURE — 78815 PET IMAGE W/CT SKULL-THIGH: CPT

## 2023-02-15 RX ORDER — SODIUM CHLORIDE 9 MG/ML
INJECTION, SOLUTION INTRAVENOUS PRN
Status: CANCELLED | OUTPATIENT
Start: 2023-02-15

## 2023-02-15 RX ORDER — FLUDEOXYGLUCOSE F 18 200 MCI/ML
14.8 INJECTION, SOLUTION INTRAVENOUS
Status: COMPLETED | OUTPATIENT
Start: 2023-02-15 | End: 2023-02-15

## 2023-02-15 RX ORDER — SODIUM CHLORIDE 0.9 % (FLUSH) 0.9 %
5-40 SYRINGE (ML) INJECTION PRN
Status: CANCELLED | OUTPATIENT
Start: 2023-02-15

## 2023-02-15 RX ORDER — SODIUM CHLORIDE 0.9 % (FLUSH) 0.9 %
5-40 SYRINGE (ML) INJECTION EVERY 12 HOURS SCHEDULED
Status: CANCELLED | OUTPATIENT
Start: 2023-02-15

## 2023-02-15 RX ADMIN — FLUDEOXYGLUCOSE F 18 14.8 MILLICURIE: 200 INJECTION, SOLUTION INTRAVENOUS at 08:14

## 2023-02-20 ENCOUNTER — ANESTHESIA (OUTPATIENT)
Dept: OPERATING ROOM | Age: 63
End: 2023-02-20
Payer: OTHER GOVERNMENT

## 2023-02-20 ENCOUNTER — ANESTHESIA EVENT (OUTPATIENT)
Dept: OPERATING ROOM | Age: 63
End: 2023-02-20
Payer: OTHER GOVERNMENT

## 2023-02-20 ENCOUNTER — HOSPITAL ENCOUNTER (OUTPATIENT)
Age: 63
Discharge: HOME | End: 2023-02-21
Attending: UROLOGY | Admitting: UROLOGY
Payer: OTHER GOVERNMENT

## 2023-02-20 DIAGNOSIS — G89.18 POST-OPERATIVE PAIN: Primary | ICD-10-CM

## 2023-02-20 PROBLEM — N52.8 OTHER MALE ERECTILE DYSFUNCTION: Status: ACTIVE | Noted: 2023-02-20

## 2023-02-20 PROCEDURE — A4217 STERILE WATER/SALINE, 500 ML: HCPCS | Performed by: UROLOGY

## 2023-02-20 PROCEDURE — 2580000003 HC RX 258: Performed by: UROLOGY

## 2023-02-20 PROCEDURE — 7100000001 HC PACU RECOVERY - ADDTL 15 MIN: Performed by: UROLOGY

## 2023-02-20 PROCEDURE — 2709999900 HC NON-CHARGEABLE SUPPLY: Performed by: UROLOGY

## 2023-02-20 PROCEDURE — 6360000002 HC RX W HCPCS: Performed by: ANESTHESIOLOGY

## 2023-02-20 PROCEDURE — 2500000003 HC RX 250 WO HCPCS: Performed by: NURSE ANESTHETIST, CERTIFIED REGISTERED

## 2023-02-20 PROCEDURE — 2720000010 HC SURG SUPPLY STERILE: Performed by: UROLOGY

## 2023-02-20 PROCEDURE — 6360000002 HC RX W HCPCS: Performed by: UROLOGY

## 2023-02-20 PROCEDURE — 3700000000 HC ANESTHESIA ATTENDED CARE: Performed by: UROLOGY

## 2023-02-20 PROCEDURE — 6370000000 HC RX 637 (ALT 250 FOR IP): Performed by: UROLOGY

## 2023-02-20 PROCEDURE — 3700000001 HC ADD 15 MINUTES (ANESTHESIA): Performed by: UROLOGY

## 2023-02-20 PROCEDURE — 6360000002 HC RX W HCPCS: Performed by: NURSE ANESTHETIST, CERTIFIED REGISTERED

## 2023-02-20 PROCEDURE — C1813 PROSTHESIS, PENILE, INFLATAB: HCPCS | Performed by: UROLOGY

## 2023-02-20 PROCEDURE — 3600000013 HC SURGERY LEVEL 3 ADDTL 15MIN: Performed by: UROLOGY

## 2023-02-20 PROCEDURE — 7100000000 HC PACU RECOVERY - FIRST 15 MIN: Performed by: UROLOGY

## 2023-02-20 PROCEDURE — 3600000003 HC SURGERY LEVEL 3 BASE: Performed by: UROLOGY

## 2023-02-20 DEVICE — INFLATABLE PENILE PROSTHESIS, INHIBIZONE/PRECONNECTED - PENOSCROTAL 1 PUMP 2 CYLINDERS WITH 14 CM LONG PRECONNECT TUBING
Type: IMPLANTABLE DEVICE | Site: PENIS | Status: FUNCTIONAL
Brand: AMS 700 CX MS PUMP

## 2023-02-20 DEVICE — DEVICE ERECTILE RESTR FOR PENILE PROS: Type: IMPLANTABLE DEVICE | Site: PENIS | Status: FUNCTIONAL

## 2023-02-20 DEVICE — INFLATABLE PENILE PROSTHESIS WITH MS PUMP ACCESSORY KIT
Type: IMPLANTABLE DEVICE | Site: PENIS | Status: FUNCTIONAL
Brand: AMS 700 ACCESSORY KIT

## 2023-02-20 DEVICE — AMS 700 PENILE PROSTHESIS, 1 LOW PROFILE RESERVOIR, UP TO 100 ML, INHIBIZONE TREATED
Type: IMPLANTABLE DEVICE | Site: PENIS | Status: FUNCTIONAL
Brand: CONCEAL

## 2023-02-20 DEVICE — NON-STACKABLE REAR TIP EXTENDERS FOR AMS 700 CX AND LGX CYLINDERS
Type: IMPLANTABLE DEVICE | Site: PENIS | Status: FUNCTIONAL
Brand: REAR TIP EXTENDER

## 2023-02-20 RX ORDER — SODIUM CHLORIDE 9 MG/ML
INJECTION, SOLUTION INTRAVENOUS PRN
Status: DISCONTINUED | OUTPATIENT
Start: 2023-02-20 | End: 2023-02-21 | Stop reason: HOSPADM

## 2023-02-20 RX ORDER — SODIUM CHLORIDE 0.9 % (FLUSH) 0.9 %
5-40 SYRINGE (ML) INJECTION EVERY 12 HOURS SCHEDULED
Status: DISCONTINUED | OUTPATIENT
Start: 2023-02-20 | End: 2023-02-20 | Stop reason: HOSPADM

## 2023-02-20 RX ORDER — FENTANYL CITRATE 50 UG/ML
50 INJECTION, SOLUTION INTRAMUSCULAR; INTRAVENOUS EVERY 5 MIN PRN
Status: DISCONTINUED | OUTPATIENT
Start: 2023-02-20 | End: 2023-02-20 | Stop reason: HOSPADM

## 2023-02-20 RX ORDER — SODIUM CHLORIDE 9 MG/ML
INJECTION, SOLUTION INTRAVENOUS PRN
Status: DISCONTINUED | OUTPATIENT
Start: 2023-02-20 | End: 2023-02-20 | Stop reason: HOSPADM

## 2023-02-20 RX ORDER — MIDAZOLAM HYDROCHLORIDE 1 MG/ML
INJECTION INTRAMUSCULAR; INTRAVENOUS PRN
Status: DISCONTINUED | OUTPATIENT
Start: 2023-02-20 | End: 2023-02-20 | Stop reason: SDUPTHER

## 2023-02-20 RX ORDER — FLUOXETINE HYDROCHLORIDE 20 MG/1
20 CAPSULE ORAL DAILY
Status: DISCONTINUED | OUTPATIENT
Start: 2023-02-20 | End: 2023-02-21 | Stop reason: HOSPADM

## 2023-02-20 RX ORDER — SODIUM CHLORIDE 0.9 % (FLUSH) 0.9 %
5-40 SYRINGE (ML) INJECTION PRN
Status: DISCONTINUED | OUTPATIENT
Start: 2023-02-20 | End: 2023-02-21 | Stop reason: HOSPADM

## 2023-02-20 RX ORDER — ONDANSETRON 4 MG/1
4 TABLET, ORALLY DISINTEGRATING ORAL EVERY 8 HOURS PRN
Status: DISCONTINUED | OUTPATIENT
Start: 2023-02-20 | End: 2023-02-21 | Stop reason: HOSPADM

## 2023-02-20 RX ORDER — LIDOCAINE 4 G/G
1 PATCH TOPICAL EVERY 24 HOURS
Status: DISCONTINUED | OUTPATIENT
Start: 2023-02-20 | End: 2023-02-21 | Stop reason: HOSPADM

## 2023-02-20 RX ORDER — CALCIUM POLYCARBOPHIL 625 MG 625 MG/1
625 TABLET ORAL DAILY
Status: DISCONTINUED | OUTPATIENT
Start: 2023-02-20 | End: 2023-02-21 | Stop reason: HOSPADM

## 2023-02-20 RX ORDER — ACETAMINOPHEN 325 MG/1
650 TABLET ORAL EVERY 6 HOURS
Status: DISCONTINUED | OUTPATIENT
Start: 2023-02-20 | End: 2023-02-21 | Stop reason: HOSPADM

## 2023-02-20 RX ORDER — LABETALOL HYDROCHLORIDE 5 MG/ML
10 INJECTION, SOLUTION INTRAVENOUS
Status: DISCONTINUED | OUTPATIENT
Start: 2023-02-20 | End: 2023-02-20 | Stop reason: HOSPADM

## 2023-02-20 RX ORDER — ONDANSETRON 2 MG/ML
4 INJECTION INTRAMUSCULAR; INTRAVENOUS EVERY 6 HOURS PRN
Status: DISCONTINUED | OUTPATIENT
Start: 2023-02-20 | End: 2023-02-21 | Stop reason: HOSPADM

## 2023-02-20 RX ORDER — EPHEDRINE SULFATE/0.9% NACL/PF 50 MG/5 ML
SYRINGE (ML) INTRAVENOUS PRN
Status: DISCONTINUED | OUTPATIENT
Start: 2023-02-20 | End: 2023-02-20 | Stop reason: SDUPTHER

## 2023-02-20 RX ORDER — SODIUM CHLORIDE 9 MG/ML
INJECTION, SOLUTION INTRAVENOUS CONTINUOUS
Status: DISCONTINUED | OUTPATIENT
Start: 2023-02-20 | End: 2023-02-21 | Stop reason: HOSPADM

## 2023-02-20 RX ORDER — GABAPENTIN 100 MG/1
100 CAPSULE ORAL 2 TIMES DAILY
Status: DISCONTINUED | OUTPATIENT
Start: 2023-02-20 | End: 2023-02-21 | Stop reason: HOSPADM

## 2023-02-20 RX ORDER — GLYCOPYRROLATE 0.2 MG/ML
INJECTION INTRAMUSCULAR; INTRAVENOUS PRN
Status: DISCONTINUED | OUTPATIENT
Start: 2023-02-20 | End: 2023-02-20 | Stop reason: SDUPTHER

## 2023-02-20 RX ORDER — ROCURONIUM BROMIDE 10 MG/ML
INJECTION, SOLUTION INTRAVENOUS PRN
Status: DISCONTINUED | OUTPATIENT
Start: 2023-02-20 | End: 2023-02-20 | Stop reason: SDUPTHER

## 2023-02-20 RX ORDER — FENTANYL CITRATE 50 UG/ML
INJECTION, SOLUTION INTRAMUSCULAR; INTRAVENOUS PRN
Status: DISCONTINUED | OUTPATIENT
Start: 2023-02-20 | End: 2023-02-20 | Stop reason: SDUPTHER

## 2023-02-20 RX ORDER — DROPERIDOL 2.5 MG/ML
0.62 INJECTION, SOLUTION INTRAMUSCULAR; INTRAVENOUS
Status: DISCONTINUED | OUTPATIENT
Start: 2023-02-20 | End: 2023-02-20 | Stop reason: HOSPADM

## 2023-02-20 RX ORDER — PROPOFOL 10 MG/ML
INJECTION, EMULSION INTRAVENOUS PRN
Status: DISCONTINUED | OUTPATIENT
Start: 2023-02-20 | End: 2023-02-20 | Stop reason: SDUPTHER

## 2023-02-20 RX ORDER — SENNA AND DOCUSATE SODIUM 50; 8.6 MG/1; MG/1
1 TABLET, FILM COATED ORAL 2 TIMES DAILY
Status: DISCONTINUED | OUTPATIENT
Start: 2023-02-20 | End: 2023-02-21 | Stop reason: HOSPADM

## 2023-02-20 RX ORDER — CEFAZOLIN SODIUM 1 G/50ML
1000 INJECTION, SOLUTION INTRAVENOUS EVERY 8 HOURS
Status: DISCONTINUED | OUTPATIENT
Start: 2023-02-20 | End: 2023-02-21 | Stop reason: HOSPADM

## 2023-02-20 RX ORDER — CHOLECALCIFEROL (VITAMIN D3) 125 MCG
1 CAPSULE ORAL
Status: DISCONTINUED | OUTPATIENT
Start: 2023-02-20 | End: 2023-02-20 | Stop reason: RX

## 2023-02-20 RX ORDER — LIDOCAINE HYDROCHLORIDE 20 MG/ML
INJECTION, SOLUTION INTRAVENOUS PRN
Status: DISCONTINUED | OUTPATIENT
Start: 2023-02-20 | End: 2023-02-20 | Stop reason: SDUPTHER

## 2023-02-20 RX ORDER — IPRATROPIUM BROMIDE AND ALBUTEROL SULFATE 2.5; .5 MG/3ML; MG/3ML
1 SOLUTION RESPIRATORY (INHALATION)
Status: DISCONTINUED | OUTPATIENT
Start: 2023-02-20 | End: 2023-02-20 | Stop reason: HOSPADM

## 2023-02-20 RX ORDER — TAMSULOSIN HYDROCHLORIDE 0.4 MG/1
0.4 CAPSULE ORAL DAILY
Status: DISCONTINUED | OUTPATIENT
Start: 2023-02-20 | End: 2023-02-21 | Stop reason: HOSPADM

## 2023-02-20 RX ORDER — MORPHINE SULFATE 4 MG/ML
4 INJECTION, SOLUTION INTRAMUSCULAR; INTRAVENOUS
Status: DISCONTINUED | OUTPATIENT
Start: 2023-02-20 | End: 2023-02-21 | Stop reason: HOSPADM

## 2023-02-20 RX ORDER — LORAZEPAM 2 MG/ML
0.5 INJECTION INTRAMUSCULAR
Status: DISCONTINUED | OUTPATIENT
Start: 2023-02-20 | End: 2023-02-20 | Stop reason: HOSPADM

## 2023-02-20 RX ORDER — METOPROLOL SUCCINATE 50 MG/1
50 TABLET, EXTENDED RELEASE ORAL DAILY
Status: DISCONTINUED | OUTPATIENT
Start: 2023-02-20 | End: 2023-02-21 | Stop reason: HOSPADM

## 2023-02-20 RX ORDER — FENTANYL CITRATE 50 UG/ML
25 INJECTION, SOLUTION INTRAMUSCULAR; INTRAVENOUS EVERY 5 MIN PRN
Status: DISCONTINUED | OUTPATIENT
Start: 2023-02-20 | End: 2023-02-20 | Stop reason: HOSPADM

## 2023-02-20 RX ORDER — DEXAMETHASONE SODIUM PHOSPHATE 10 MG/ML
INJECTION, EMULSION INTRAMUSCULAR; INTRAVENOUS PRN
Status: DISCONTINUED | OUTPATIENT
Start: 2023-02-20 | End: 2023-02-20 | Stop reason: SDUPTHER

## 2023-02-20 RX ORDER — MORPHINE SULFATE 2 MG/ML
2 INJECTION, SOLUTION INTRAMUSCULAR; INTRAVENOUS
Status: DISCONTINUED | OUTPATIENT
Start: 2023-02-20 | End: 2023-02-21 | Stop reason: HOSPADM

## 2023-02-20 RX ORDER — SODIUM CHLORIDE 0.9 % (FLUSH) 0.9 %
5-40 SYRINGE (ML) INJECTION EVERY 12 HOURS SCHEDULED
Status: DISCONTINUED | OUTPATIENT
Start: 2023-02-20 | End: 2023-02-21 | Stop reason: HOSPADM

## 2023-02-20 RX ORDER — SODIUM CHLORIDE 0.9 % (FLUSH) 0.9 %
5-40 SYRINGE (ML) INJECTION PRN
Status: DISCONTINUED | OUTPATIENT
Start: 2023-02-20 | End: 2023-02-20 | Stop reason: HOSPADM

## 2023-02-20 RX ORDER — SUCCINYLCHOLINE/SOD CL,ISO/PF 200MG/10ML
SYRINGE (ML) INTRAVENOUS PRN
Status: DISCONTINUED | OUTPATIENT
Start: 2023-02-20 | End: 2023-02-20 | Stop reason: SDUPTHER

## 2023-02-20 RX ORDER — ONDANSETRON 2 MG/ML
4 INJECTION INTRAMUSCULAR; INTRAVENOUS
Status: DISCONTINUED | OUTPATIENT
Start: 2023-02-20 | End: 2023-02-20 | Stop reason: HOSPADM

## 2023-02-20 RX ORDER — ACETAMINOPHEN 325 MG/1
650 TABLET ORAL ONCE
Status: DISCONTINUED | OUTPATIENT
Start: 2023-02-20 | End: 2023-02-20 | Stop reason: HOSPADM

## 2023-02-20 RX ORDER — ONDANSETRON 2 MG/ML
INJECTION INTRAMUSCULAR; INTRAVENOUS PRN
Status: DISCONTINUED | OUTPATIENT
Start: 2023-02-20 | End: 2023-02-20 | Stop reason: SDUPTHER

## 2023-02-20 RX ORDER — OXYCODONE HYDROCHLORIDE 5 MG/1
5 TABLET ORAL EVERY 4 HOURS PRN
Status: DISCONTINUED | OUTPATIENT
Start: 2023-02-20 | End: 2023-02-21 | Stop reason: HOSPADM

## 2023-02-20 RX ORDER — DIPHENHYDRAMINE HYDROCHLORIDE 50 MG/ML
12.5 INJECTION INTRAMUSCULAR; INTRAVENOUS
Status: DISCONTINUED | OUTPATIENT
Start: 2023-02-20 | End: 2023-02-20 | Stop reason: HOSPADM

## 2023-02-20 RX ADMIN — ROCURONIUM BROMIDE 25 MG: 50 INJECTION, SOLUTION INTRAVENOUS at 14:12

## 2023-02-20 RX ADMIN — GLYCOPYRROLATE 0.2 MG: 0.2 INJECTION INTRAMUSCULAR; INTRAVENOUS at 14:48

## 2023-02-20 RX ADMIN — ACETAMINOPHEN 650 MG: 325 TABLET ORAL at 23:15

## 2023-02-20 RX ADMIN — DEXAMETHASONE SODIUM PHOSPHATE 8 MG: 10 INJECTION, EMULSION INTRAMUSCULAR; INTRAVENOUS at 14:12

## 2023-02-20 RX ADMIN — FENTANYL CITRATE 25 MCG: 50 INJECTION, SOLUTION INTRAMUSCULAR; INTRAVENOUS at 15:27

## 2023-02-20 RX ADMIN — FENTANYL CITRATE 50 MCG: 50 INJECTION, SOLUTION INTRAMUSCULAR; INTRAVENOUS at 16:05

## 2023-02-20 RX ADMIN — PHENYLEPHRINE HYDROCHLORIDE 100 MCG: 10 INJECTION INTRAVENOUS at 15:21

## 2023-02-20 RX ADMIN — MIDAZOLAM 2 MG: 1 INJECTION INTRAMUSCULAR; INTRAVENOUS at 14:04

## 2023-02-20 RX ADMIN — SODIUM CHLORIDE: 9 INJECTION, SOLUTION INTRAVENOUS at 14:01

## 2023-02-20 RX ADMIN — PHENYLEPHRINE HYDROCHLORIDE 200 MCG: 10 INJECTION INTRAVENOUS at 14:38

## 2023-02-20 RX ADMIN — Medication 10 MG: at 14:49

## 2023-02-20 RX ADMIN — PHENYLEPHRINE HYDROCHLORIDE 100 MCG: 10 INJECTION INTRAVENOUS at 14:37

## 2023-02-20 RX ADMIN — Medication 2000 MG: at 14:19

## 2023-02-20 RX ADMIN — PHENYLEPHRINE HYDROCHLORIDE 100 MCG: 10 INJECTION INTRAVENOUS at 14:24

## 2023-02-20 RX ADMIN — CEFAZOLIN SODIUM 1000 MG: 1 INJECTION, SOLUTION INTRAVENOUS at 22:02

## 2023-02-20 RX ADMIN — OXYCODONE 5 MG: 5 TABLET ORAL at 19:41

## 2023-02-20 RX ADMIN — PHENYLEPHRINE HYDROCHLORIDE 100 MCG: 10 INJECTION INTRAVENOUS at 14:25

## 2023-02-20 RX ADMIN — MORPHINE SULFATE 4 MG: 4 INJECTION, SOLUTION INTRAMUSCULAR; INTRAVENOUS at 21:59

## 2023-02-20 RX ADMIN — ACETAMINOPHEN 650 MG: 325 TABLET ORAL at 17:19

## 2023-02-20 RX ADMIN — SODIUM CHLORIDE: 9 INJECTION, SOLUTION INTRAVENOUS at 15:15

## 2023-02-20 RX ADMIN — MORPHINE SULFATE 4 MG: 4 INJECTION, SOLUTION INTRAMUSCULAR; INTRAVENOUS at 17:19

## 2023-02-20 RX ADMIN — FENTANYL CITRATE 25 MCG: 50 INJECTION, SOLUTION INTRAMUSCULAR; INTRAVENOUS at 15:41

## 2023-02-20 RX ADMIN — PHENYLEPHRINE HYDROCHLORIDE 200 MCG: 10 INJECTION INTRAVENOUS at 14:45

## 2023-02-20 RX ADMIN — FENTANYL CITRATE 50 MCG: 50 INJECTION, SOLUTION INTRAMUSCULAR; INTRAVENOUS at 15:07

## 2023-02-20 RX ADMIN — Medication 1250 MG: at 14:19

## 2023-02-20 RX ADMIN — Medication 10 MG: at 15:24

## 2023-02-20 RX ADMIN — PROPOFOL 25 MG: 10 INJECTION, EMULSION INTRAVENOUS at 15:27

## 2023-02-20 RX ADMIN — FENTANYL CITRATE 100 MCG: 50 INJECTION, SOLUTION INTRAMUSCULAR; INTRAVENOUS at 14:08

## 2023-02-20 RX ADMIN — ONDANSETRON 4 MG: 2 INJECTION INTRAMUSCULAR; INTRAVENOUS at 15:21

## 2023-02-20 RX ADMIN — LIDOCAINE HYDROCHLORIDE 100 MG: 20 INJECTION INTRAVENOUS at 14:08

## 2023-02-20 RX ADMIN — PHENYLEPHRINE HYDROCHLORIDE 200 MCG: 10 INJECTION INTRAVENOUS at 14:26

## 2023-02-20 RX ADMIN — FENTANYL CITRATE 50 MCG: 50 INJECTION, SOLUTION INTRAMUSCULAR; INTRAVENOUS at 16:00

## 2023-02-20 RX ADMIN — PHENYLEPHRINE HYDROCHLORIDE 100 MCG: 10 INJECTION INTRAVENOUS at 14:23

## 2023-02-20 RX ADMIN — PROPOFOL 150 MG: 10 INJECTION, EMULSION INTRAVENOUS at 14:08

## 2023-02-20 RX ADMIN — SODIUM CHLORIDE: 9 INJECTION, SOLUTION INTRAVENOUS at 19:39

## 2023-02-20 RX ADMIN — PHENYLEPHRINE HYDROCHLORIDE 100 MCG: 10 INJECTION INTRAVENOUS at 15:15

## 2023-02-20 RX ADMIN — Medication 160 MG: at 14:08

## 2023-02-20 ASSESSMENT — PAIN DESCRIPTION - DESCRIPTORS
DESCRIPTORS: BURNING
DESCRIPTORS: BURNING;DISCOMFORT
DESCRIPTORS: BURNING

## 2023-02-20 ASSESSMENT — PAIN DESCRIPTION - LOCATION
LOCATION: PENIS

## 2023-02-20 ASSESSMENT — PAIN SCALES - GENERAL
PAINLEVEL_OUTOF10: 10
PAINLEVEL_OUTOF10: 8
PAINLEVEL_OUTOF10: 10
PAINLEVEL_OUTOF10: 7
PAINLEVEL_OUTOF10: 0
PAINLEVEL_OUTOF10: 10
PAINLEVEL_OUTOF10: 10

## 2023-02-20 ASSESSMENT — PAIN DESCRIPTION - FREQUENCY: FREQUENCY: CONTINUOUS

## 2023-02-20 ASSESSMENT — PAIN DESCRIPTION - ONSET: ONSET: ON-GOING

## 2023-02-20 ASSESSMENT — PAIN DESCRIPTION - PAIN TYPE
TYPE: SURGICAL PAIN

## 2023-02-20 ASSESSMENT — PAIN - FUNCTIONAL ASSESSMENT
PAIN_FUNCTIONAL_ASSESSMENT: ACTIVITIES ARE NOT PREVENTED
PAIN_FUNCTIONAL_ASSESSMENT: ACTIVITIES ARE NOT PREVENTED

## 2023-02-20 ASSESSMENT — COPD QUESTIONNAIRES: CAT_SEVERITY: MODERATE

## 2023-02-20 ASSESSMENT — PAIN DESCRIPTION - ORIENTATION
ORIENTATION: MID
ORIENTATION: MID

## 2023-02-20 NOTE — H&P
[facility]  Urology H&P Note     Patient:  Aletha Nunez  MRN: [id number]  YOB: 1960    ATTENDING: Snehal Crump MD     CHIEF COMPLAINT:  Erectile dysfunction    HISTORY OF PRESENT ILLNESS:   The patient is a 58 y.o. male who presents with erectile dysfunction    Patient's old records, notes and chart reviewed and summarized above. Past Medical History:    Past Medical History:   Diagnosis Date    Arthritis     Bipolar 1 disorder ([facility]     COPD (chronic obstructive pulmonary disease) ([facility] [facility]     Hypertension     Lumbar radiculopathy        Past Surgical History:    Past Surgical History:   Procedure Laterality Date    COLONOSCOPY Left 6/21/2019    COLONOSCOPY DIAGNOSTIC performed by Judith Callahan MD at [place] Right     HIP SURGERY Right 3/15/2021    right hip steroid injection no sedation performed by Дмитрий Ashraf MD at [facility] Right 2/8/2021    RIGHT TFLESI @ L4 and L5. performed by Дмитрий Ashraf MD at [facility] Right 4/20/2021    LESI @L4#1 right performed by Дмитрий Ashraf MD at [facility] Right 6/22/2021    right LESI # 2 @ L3 performed by Дмитрий Ashraf MD at [address] Left 6/21/2019    EGD BIOPSY performed by Judith Callahan MD at [facility]       Medications Prior to Admission:   Prior to Admission medications    Medication Sig Start Date End Date Taking?  Authorizing Provider   tamsulosin (FLOMAX) 0.4 mg capsule Take 1 capsule by mouth daily Take one capsule daily to facilitate passage of ureteral stone 6/10/22 2/10/23  FRANK Chisholm - CNP   lidocaine (LIDODERM) 5 % Place 1 patch onto the skin daily 12 hours on, 12 hours off. 1/31/22   FRANK Campa - KT   ibuprofen (ADVIL;MOTRIN) 800 MG tablet Take 1 tablet by mouth every 8 hours as needed for Pain 1/31/22   Bertram OdiliaFRANK CNP   gabapentin (NEURONTIN) 300 MG capsule take 1 capsule by mouth twice a day 11/14/21 2/10/23  Scarlettdexter AdhikariFRANK CNP   lactase (LACTAID) 3000 units tablet Take 1 tablet by mouth 3 times daily (with meals) 3/30/20   Mi Durand PA-C   Calcium Polycarbophil (FIBER) 625 MG TABS Take 1 tablet by mouth daily 3/30/20   Mi Durand PA-C   FLUoxetine (PROZAC) 20 MG capsule Take 20 mg by mouth daily. Historical Provider, MD   metoprolol (TOPROL-XL) 50 MG XL tablet Take 50 mg by mouth daily. Historical Provider, MD       Allergies:  Patient has no active allergies.     Social History:    Social History     Socioeconomic History    Marital status: Single     Spouse name: Not on file    Number of children: Not on file    Years of education: Not on file    Highest education level: Not on file   Occupational History    Not on file   Tobacco Use    Smoking status: Every Day     Packs/day: 0.25     Years: 30.00     Pack years: 7.50     Types: Cigarettes     Start date: 4/27/1983    Smokeless tobacco: Never   Vaping Use    Vaping Use: Never used   Substance and Sexual Activity    Alcohol use: Yes     Comment: occasionally    Drug use: No     Comment: hx of    Sexual activity: Yes     Partners: Female   Other Topics Concern    Not on file   Social History Narrative    Not on file     Social Determinants of Health     Financial Resource Strain: Not on file   Food Insecurity: Not on file   Transportation Needs: Not on file   Physical Activity: Not on file   Stress: Not on file   Social Connections: Not on file   Intimate Partner Violence: Not on file   Housing Stability: Not on file       Family History:    Family History   Problem Relation Age of Onset    Diabetes Mother     Diabetes Father        REVIEW OF SYSTEMS:  All systems reviewed and negative except for that already noted in the HPI.    Physical Exam:      No data found. Constitutional: Patient in no acute distress; Neuro: alert and oriented to person place and time. Psych: Mood and affect normal.  Skin: Normal  Lungs: Respiratory effort normal  Cardiovascular:  Normal peripheral pulses  Abdomen: Soft, non-tender, non-distended with no CVA, flank pain, hepatosplenomegaly or hernia. Kidneys normal.  Bladder non-tender and not distended. Lymphatics: no palpable lymphadenopathy        Assessment and Plan   Impression:    Patient Active Problem List   Diagnosis    Closed right calcaneal fracture    Lumbar radiculitis    Spinal stenosis of lumbar region without neurogenic claudication    Primary osteoarthritis of right hip       Plan: IPP 3 piece    Risks benefits and alternative procedures are explained, informed consent is obtained, and the patient elects to proceed.

## 2023-02-20 NOTE — ANESTHESIA POSTPROCEDURE EVALUATION
Department of Anesthesiology  Postprocedure Note    Patient: Steve Bueno  MRN: [id number]  YOB: 1960  Date of evaluation: 2/20/2023      Procedure Summary     Date: 02/20/23 Room / Location: [address]    Anesthesia Start: 1404 Anesthesia Stop: 6536    Procedure: PENILE PROSTHESIS INSERTION Diagnosis:       Other male erectile dysfunction      (Other male erectile dysfunction [N52.8])    Surgeons: Douglas Jung MD Responsible Provider: Sayda Watson DO    Anesthesia Type: general ASA Status: 3          Anesthesia Type: No value filed.     Kervin Phase I: Kervin Score: 10    Kervin Phase II:        Anesthesia Post Evaluation    Patient location during evaluation: PACU  Patient participation: complete - patient participated  Level of consciousness: awake  Airway patency: patent  Nausea & Vomiting: no nausea  Complications: no  Cardiovascular status: hemodynamically stable  Respiratory status: acceptable  Hydration status: stable

## 2023-02-20 NOTE — PROGRESS NOTES
Patient received in room [id number] from PACU per bed. Post op orders and care board reviewed. Bed alarm placed on.

## 2023-02-20 NOTE — PROGRESS NOTES
ADMITTED TO [facility] AND ORIENTED TO UNIT. SCDS ON. FALL BAND ON. PT VERBALIZED APPROVAL FOR FIRST NAME, LAST INITIAL AND PHYSICIAN NAME ON UNIT WHITEBOARD. Friend to pick patient when discharged.

## 2023-02-20 NOTE — PROGRESS NOTES
1550 - pt arrives to pacu, respirations unlabored, opa removed upon arrival per Dr. Washington Clifford, pt denies pain, VSS    1600 - pt given 50 mcg of fentanyl    1605 - pt given 50 mcg of fentanyl, ice pack applied to surgical area    1615 - pt states pain improving    1620 -report called to [address] - pt meets criteria for discharge from pacu at this time    1642 - pt transported to  [facility] in stable condition

## 2023-02-20 NOTE — OP NOTE
Hospital: [address] [place] [address] [facility] [place] [place]   DATE:  2/20/23   Agatha Dinh  1960  [id number]    SURGEON: Dr. Lynnette Felder MD , M.D.  [facility]:  Dr. Lynnette Felder MD , MD  PREOPERATIVE DIAGNOSIS:  Erectile dysfunction. POSTOPERATIVE DIAGNOSIS:  Erectile dysfunction. PROCEDURE:Placement of three-piece inflatable penile prosthesis- [id number]. ANESTHESIA:General.  COMPLICATIONS:None. EBL: 10 cc  DRAINS:  A 16 Andorran Packer catheter. SPECIMEN: None. ANTIBIOTICS:  Vancomycin and gentamicin. INDICATIONS FOR PROCEDURE:  Agatha Dinh is a 58 y.o. male  with erectile dysfunction refractory to medical therapy. Treatment options were discussed. He elected to undergo the above-mentioned procedure. The risks, benefits, and alternatives as well as possible complications of the procedure were explained to the patient and informed consent was obtained. PROCEDURE:  The patient was brought back to the operating room, was laid in the supine position. EPC cuffs were placed on and functioning prior to induction. Antibiotics were administered. General anesthesia was induced. His genitals and lower  abdomen were shaved, prepped for 15 minutes, draped in sterile surgical  fashion. A time out was performed. We placed a 16 Andorran Packer catheter. We then made a penoscrotal incision and used a [place] Star retractor and sharp and blunt maneuvers dissected down to the corporal bodies bilaterally. We maintained view of the urethra the entire time. We then placed our Prolene stay sutures in both the corporal bodies bilaterally. We then made our corporotomies. We then dilated up to a 15 Western Maira with the Hegar dilators. Proximal and distal measurements were 14 cm and 14cm on the right side, and 10 cm and 10cm on the left side. The rear tip extender measurements were 1 cm on the right and 1 on the left. We then made our subdartos pouch in the scrotum for the scrotal pump.  Meanwhile the 700CX cylinders with inhibizone (rifampine and minocycline) were prepared on the back table. We entered through the left inguinal ring. The reservoir was placed into the space of retizus. The cylinders were then placed through the corporotomies proximally and distally and they fit in well without any crossover noted without any buckling or S-curve deformity noted. We did test the cylinders and they were in proper position up to the most distal aspect of the corporal bodies. Again, no deformities or crossover noted. The  reservoir with inhibizone (rifampine and minocycline) with 90 mL was placed in the preformed preperitoneal space. We then placed a pump within the scrotum and used 3-0 Vicryl to place a layer of dartos over this. The pump tubing was tunneled from the scrotum up to suprapubic incision. We then used the quick connect device to connect the reservoir tubing to the pump. At this point we began our closure. We closed the dartos layer in the scrotum with 3-0 Vicryl suture and then the skin with 4-0 monocryl and reinforced with Dermabond. Pressure dressing was placed and this concluded the case. All needle counts were correct. His anesthesia was reversed and he was extubated and taken to recovery in stable condition. The Attending was  was present throughout all critical portions of the case.

## 2023-02-21 ENCOUNTER — TELEPHONE (OUTPATIENT)
Dept: UROLOGY | Age: 63
End: 2023-02-21

## 2023-02-21 VITALS
OXYGEN SATURATION: 99 % | SYSTOLIC BLOOD PRESSURE: 125 MMHG | HEART RATE: 55 BPM | DIASTOLIC BLOOD PRESSURE: 76 MMHG | WEIGHT: 174.2 LBS | BODY MASS INDEX: 22.36 KG/M2 | HEIGHT: 74 IN | TEMPERATURE: 97.5 F | RESPIRATION RATE: 18 BRPM

## 2023-02-21 PROCEDURE — 6360000002 HC RX W HCPCS: Performed by: UROLOGY

## 2023-02-21 PROCEDURE — 99238 HOSP IP/OBS DSCHRG MGMT 30/<: CPT | Performed by: UROLOGY

## 2023-02-21 PROCEDURE — 2580000003 HC RX 258: Performed by: UROLOGY

## 2023-02-21 PROCEDURE — 6370000000 HC RX 637 (ALT 250 FOR IP): Performed by: UROLOGY

## 2023-02-21 RX ORDER — CEPHALEXIN 500 MG/1
500 CAPSULE ORAL 4 TIMES DAILY
Qty: 28 CAPSULE | Refills: 0 | Status: SHIPPED | OUTPATIENT
Start: 2023-02-21 | End: 2023-02-28

## 2023-02-21 RX ORDER — DOCUSATE SODIUM 100 MG/1
100 CAPSULE, LIQUID FILLED ORAL 2 TIMES DAILY
Qty: 60 CAPSULE | Refills: 0 | Status: SHIPPED | OUTPATIENT
Start: 2023-02-21 | End: 2023-03-23

## 2023-02-21 RX ORDER — OXYCODONE HYDROCHLORIDE AND ACETAMINOPHEN 5; 325 MG/1; MG/1
1 TABLET ORAL EVERY 6 HOURS PRN
Qty: 10 TABLET | Refills: 0 | Status: SHIPPED | OUTPATIENT
Start: 2023-02-21 | End: 2023-02-24

## 2023-02-21 RX ADMIN — OXYCODONE 5 MG: 5 TABLET ORAL at 08:21

## 2023-02-21 RX ADMIN — CEFAZOLIN SODIUM 1000 MG: 1 INJECTION, SOLUTION INTRAVENOUS at 05:33

## 2023-02-21 RX ADMIN — TAMSULOSIN HYDROCHLORIDE 0.4 MG: 0.4 CAPSULE ORAL at 08:21

## 2023-02-21 RX ADMIN — SODIUM CHLORIDE: 9 INJECTION, SOLUTION INTRAVENOUS at 04:50

## 2023-02-21 RX ADMIN — ACETAMINOPHEN 650 MG: 325 TABLET ORAL at 05:31

## 2023-02-21 RX ADMIN — SENNOSIDES AND DOCUSATE SODIUM 1 TABLET: 50; 8.6 TABLET ORAL at 08:21

## 2023-02-21 RX ADMIN — OXYCODONE 5 MG: 5 TABLET ORAL at 01:00

## 2023-02-21 RX ADMIN — VANCOMYCIN HYDROCHLORIDE 1000 MG: 1 INJECTION, POWDER, LYOPHILIZED, FOR SOLUTION INTRAVENOUS at 02:15

## 2023-02-21 ASSESSMENT — PAIN DESCRIPTION - LOCATION: LOCATION: PENIS

## 2023-02-21 ASSESSMENT — PAIN SCALES - GENERAL
PAINLEVEL_OUTOF10: 10
PAINLEVEL_OUTOF10: 8

## 2023-02-21 NOTE — CARE COORDINATION
Case Management Assessment  Initial Evaluation    Date/Time of Evaluation: 2/21/2023 11:41 AM  Assessment Completed by: Rabia Finley RN    If patient is discharged prior to next notation, then this note serves as note for discharge by case management. Patient Name: Socorro Lopez                   YOB: 1960  Diagnosis: Other male erectile dysfunction [N52.8]                   Date / Time: 2/20/2023 12:53 PM  Location: [address] [place] [address]     Patient Admission Status: Outpatient in a bed   Readmission Risk Low 0-14, Mod 15-19), High > 20: No data recorded  Current PCP: Emily Richardson, DO  PCP verified by CM? Yes    Chart Reviewed: Yes      History Provided by: Patient  Patient Orientation: Alert and Oriented    Patient Cognition: Alert    Hospitalization in the last 30 days (Readmission):  No    If yes, Readmission Assessment in CM Navigator will be completed. Advance Directives:      Code Status: Prior   Patient's Primary Decision Maker is: Legal Next of Kin      Discharge Planning:    Patient lives with: Alone Type of Home:  ([facility])  Primary Care Giver: Self  Patient Support Systems include: None   Current Financial resources: [employer] ([place])  Current community resources: None  [facility]: [facility]            Current DME:              Type of Home Care services:  None    ADLS  Prior functional level: Independent in ADLs/IADLs  Current functional level: Independent in ADLs/IADLs    Family can provide assistance at DC: No  Would you like Case Management to discuss the discharge plan with any other family members/significant others, and if so, who?     Plans to Return to Present Housing: Yes  Other Identified Issues/Barriers to RETURNING to current housing: medical complications  Potential Assistance needed at discharge: N/A            Potential DME:    Patient expects to discharge to: [address] [place] [address] for transportation at discharge: Self    Financial    Payor: Harlan Mallory / Plan: Lizandro Payton / Product Type: *No Product type* /     Does insurance require precert for SNF: Yes    Potential assistance Purchasing Medications: No  Meds-to-Beds request: No      [id number] [place], [place] - [address] [phone] - F [phone]  [address] [place] [id number]  Phone: [phone] Fax: [phone]      Notes:    Factors facilitating achievement of predicted outcomes: Cooperative    Barriers to discharge: Medical complications    Additional Case Management Notes: From PACU, pain control, IV Ancef, IV Vancomycin. Procedure:   2/20 :Placement of three-piece inflatable penile prosthesis- [id number]. The Plan for Transition of Care is related to the following treatment goals of Other male erectile dysfunction [N52.8]    Patient Goals/Plan/Treatment Preferences: Met with Vanita Matos. He currently resides at the [facility], he is independent. Plan is to return home at discharge. He denies need for DME and declines HH. Transportation/Food Security/Housekeeping Addressed: No issues identified. Bucky Almonte RN  Case Management Department     2/21/23, 11:43 AM EST    Patient goals/plan/ treatment preferences discussed by  and . Patient goals/plan/ treatment preferences reviewed with patient/ family. Patient/ family verbalize understanding of discharge plan and are in agreement with goal/plan/treatment preferences. Understanding was demonstrated using the teach back method. AVS provided by RN at time of discharge, which includes all necessary medical information pertaining to the patients current course of illness, treatment, post-discharge goals of care, and treatment preferences.      Services At/After Discharge: None

## 2023-02-21 NOTE — DISCHARGE SUMMARY
Patient Identification  Neil Leigh is a 58 y.o. male. :  1960  Admit Date:  2023    Discharge date: 23                                    Disposition: home    Discharge Diagnoses:   Patient Active Problem List   Diagnosis    Closed right calcaneal fracture    Lumbar radiculitis    Spinal stenosis of lumbar region without neurogenic claudication    Primary osteoarthritis of right hip    Other male erectile dysfunction       Consults: none    Surgery: Placement of three-piece inflatable penile prosthesis- [id number]    Patient Instructions: Activity: no heavy lifting, pushing, pulling for 6 weeks, no driving while on analgesics. Diet: As tolerated  Follow-up with Dr Rosy Hobson call for confirmation    Hospital course: Patient underwent Placement of three-piece inflatable penile prosthesis- [id number] with no complications. Post-operatively he remained stable. Patient is tolerating diet, urinating adequately on his own, pain is minimal, ambulating well with assistance, having flatus and BM. Time spent for discharge [address], APRN - CNP  Urology Progress Note    Subjective: Neil Leigh is a 58 y.o. male. s/p PENILE PROSTHESIS INSERTION on 2023    His/Her current Diet is: ADULT DIET; Regular. Since the previous note, the patient reports the following:  No acute issues overnight. No fevers or chills. No nausea or vomiting. No chest pain or shortness of breath. No calf pain. Pain Controlled. Ambulating. Tolerating PO Diet.     Remove duenas, ok for d/c after void  Will send abx, pain medication, colace  OV in 3-4 wks   Do not activate device until ok'ed by Urology  Make sure to complete antibiotics     Vitals and Labs:  Patient Vitals for the past 24 hrs:   BP Temp Temp src Pulse Resp SpO2 Height Weight   23 0813 125/76 97.5 °F (36.4 °C) Oral 55 18 99 % -- --   23 0300 (!) 100/55 97.4 °F (36.3 °C) Oral 74 18 97 % -- --   23 0130 -- -- -- -- 18 -- -- --   02/20/23 2315 108/70 97.5 °F (36.4 °C) Oral 62 18 100 % -- --   02/20/23 2229 -- -- -- -- 18 -- -- --   02/20/23 2159 -- -- -- -- 18 -- -- --   02/20/23 2011 -- -- -- -- 18 -- -- --   02/20/23 1930 127/79 97.7 °F (36.5 °C) Oral 71 18 98 % -- --   02/20/23 1749 -- -- -- -- 20 -- -- --   02/20/23 1719 -- -- -- -- 14 -- -- --   02/20/23 1708 120/79 -- -- 71 14 98 % -- --   02/20/23 1653 123/79 97.6 °F (36.4 °C) Oral 76 16 99 % -- --   02/20/23 1640 122/71 -- -- 76 16 98 % -- --   02/20/23 1635 125/72 -- -- 75 18 100 % -- --   02/20/23 1630 124/76 -- -- 73 17 99 % -- --   02/20/23 1625 134/80 -- -- 79 20 98 % -- --   02/20/23 1620 128/79 -- -- 74 19 98 % -- --   02/20/23 1615 133/79 -- -- 77 20 97 % -- --   02/20/23 1610 132/77 -- -- 81 17 98 % -- --   02/20/23 1605 127/74 -- -- 81 20 99 % -- --   02/20/23 1600 138/85 -- -- 86 18 97 % -- --   02/20/23 1555 132/78 -- -- 89 20 98 % -- --   02/20/23 1550 132/78 97.1 °F (36.2 °C) Temporal 89 18 98 % -- --   02/20/23 1330 (!) 145/85 97.6 °F (36.4 °C) Temporal 86 18 98 % 6' 2\" (1.88 m) 174 lb 3.2 oz (79 kg)     I/O last 3 completed shifts: In: 18 [P.O.:480; I.V.:1000]  Out: 1000 [Urine:1000]    No results for input(s): WBC, HGB, HCT, MCV, PLT in the last 72 hours. No results for input(s): NA, K, CL, CO2, PHOS, BUN, CREATININE, CA in the last 72 hours. No results for input(s): COLORU, PHUR, LABCAST, WBCUA, RBCUA, MUCUS, TRICHOMONAS, YEAST, BACTERIA, CLARITYU, SPECGRAV, LEUKOCYTESUR, UROBILINOGEN, Gwynneth Rajeev in the last 72 hours. Invalid input(s): NITRATE, GLUCOSEUKETONESUAMORPHOUS    Physical Exam:  No acute distress. Awake, alert and oriented. Neck is supple  Regular rate and rhythm. Normal peripheral pulses  No accessory muscles of inspiration. Symmetric chest rise  Abdomen soft, non-tender, non-distended. No CVA tenderness. Packer draining yellow urine, sutures dry/intact  No calf pain. Minimal/no edema in bilateral lower extremities.   Skin is warm, dry  Psych: mood, affect normal    Additional Lab/Culture results:     Imaging Reviewed:     FRANK Martinez CNP independently reviewed the images and verified the radiology reports from:    PET CT SKULL BASE TO MID THIGH    Result Date: 2/15/2023  PROCEDURE: PET CT SKULL BASE TO MID THIGH CLINICAL INFORMATION: 66-year-old man with right lung nodule highly suspicious for malignancy based on recent CT of the chest; initial treatment strategy. Radiopharmaceutical: 14.8 mCi F-18 FDG, intravenously. TECHNIQUE: PET/CT imaging was performed from the skull base to the mid thigh levels using routine PET acquisition. Injection site: Right forearm. Time of FDG injection: 8:14 AM. Serum glucose: 95 mg/dL at 8:11 AM. Time of imagin:14 AM. COMPARISON: CT lung screening 2023. FINDINGS: Neck: No FDG avid cervical lymphadenopathy. There is physiologic muscular activity bilaterally in the lower neck. Chest: Cardiac size is normal. There is no pleural or pericardial effusion. Emphysematous changes are noted in the bilateral lungs. Scarring is present at the bilateral lung apices. A 1.1 cm nodule at the right lung apex is not associated with elevated metabolic activity (image 74). A previously identified right upper lobe nodule is not visible on the current study and was likely infectious or inflammatory. Previously identified small nodules in the bilateral lungs are not visualized on the current study. There are calcified mediastinal and right hilar lymph nodes. There is no hypermetabolic mediastinal, hilar or axillary lymphadenopathy. Degenerative changes are present in the thoracic spine without evidence of hypermetabolic osseous metastatic disease. Abdomen/pelvis: Physiologic activity is present in the liver, spleen, urinary collecting system and gastrointestinal tract. There are calcified granulomas in the liver and spleen.  A nonobstructive punctate calcification in the left kidney measures less than a millimeter (image 170). There are minimal atherosclerotic calcifications in the abdominal aorta without evidence of aneurysm. The prostate gland is enlarged. Phleboliths are seen in the pelvis. There are diverticula in the sigmoid colon. There is no FDG avid mesenteric, retroperitoneal, pelvic or inguinal lymphadenopathy. Degenerative changes are present in the lumbar spine and pelvis without evidence of osseous lesions. No FDG avid malignancy.  Final report electronically signed by Dr. Cameron Hebert on 2/15/2023 10:19 AM      Impression:    Patient Active Problem List   Diagnosis    Closed right calcaneal fracture    Lumbar radiculitis    Spinal stenosis of lumbar region without neurogenic claudication    Primary osteoarthritis of right hip    Other male erectile dysfunction       s/p PENILE PROSTHESIS INSERTION on 2/20/2023    FRANK Yañez CNP  8:32 AM 2/21/2023

## 2023-02-21 NOTE — PROGRESS NOTES
Discharge instructions reviewed with patient. Questions answered and he verbalized understanding. Handout for  inflatable penile prothesis patient guide given to patient. CHG soap given to patient and instructed to use daily. Prescriptions have been delivered via outpatient pharmacy. Patient voided without difficulty since dueans has been removed. Fax sent for [employer], awaiting call back.

## 2023-02-21 NOTE — DISCHARGE INSTRUCTIONS
Very important to take all Keflex - antibiotic to prevent infection  Do not active device until ok'ed by Urology  Percocet for pain, Colace to help with bowel movement    Pt ok to discharge home in good condition  No heavy lifting, >10 lbs for today  Pt should avoid strenuous activity for today  Pt should walk moderately at home  Pt ok to shower   Pt may resume diet as tolerated  Pt should take Rx as directed  No driving while on narcotics  Please call attending physician or hospital  with questions  Call or Present to ED if fever (> 101F), intractable nausea vomiting or pain.   Rx in chart     Pt should follow up with Dr Stefanie Licona, call to confirm appointment

## 2023-02-21 NOTE — PLAN OF CARE
Problem: Discharge Planning  Goal: Discharge to home or other facility with appropriate resources  Outcome: Progressing  Flowsheets  Taken 2/20/2023 2036 by Karen Vázquez RN  Discharge to home or other facility with appropriate resources:   Identify barriers to discharge with patient and caregiver   Arrange for needed discharge resources and transportation as appropriate   Identify discharge learning needs (meds, wound care, etc)   Refer to discharge planning if patient needs post-hospital services based on physician order or complex needs related to functional status, cognitive ability or social support system    Problem: Pain  Goal: Verbalizes/displays adequate comfort level or baseline comfort level  Outcome: Progressing  Flowsheets  Taken 2/20/2023 2036 by Karen Vázquez RN  Verbalizes/displays adequate comfort level or baseline comfort level:   Encourage patient to monitor pain and request assistance   Assess pain using appropriate pain scale   Administer analgesics based on type and severity of pain and evaluate response   Implement non-pharmacological measures as appropriate and evaluate response    Problem: Genitourinary - Adult  Goal: Urinary catheter remains patent  Outcome: Progressing  Flowsheets (Taken 2/20/2023 2036)  Urinary catheter remains patent: Assess patency of urinary catheter     Problem: Infection - Adult  Goal: Absence of infection at discharge  Outcome: Progressing  Flowsheets (Taken 2/20/2023 2036)  Absence of infection at discharge:   Assess and monitor for signs and symptoms of infection   Monitor lab/diagnostic results   Monitor all insertion sites i.e., indwelling lines, tubes and drains   Administer medications as ordered   Instruct and encourage patient and family to use good hand hygiene technique     Care plan reviewed with patient. Patient verbalizes understanding of plan of care and contributes to goal setting.

## 2023-02-23 ENCOUNTER — TELEPHONE (OUTPATIENT)
Dept: UROLOGY | Age: 63
End: 2023-02-23

## 2023-02-23 NOTE — TELEPHONE ENCOUNTER
IPP by Dr Jabier Suarez  Was sent with 10 percocet  He stopped by requesting more pain medication  Dr Jabier Suarez told me at discharge he can transition to tylenol/motrin after short course of percocet  Please call pt and let him know

## 2023-03-02 ENCOUNTER — APPOINTMENT (OUTPATIENT)
Dept: ULTRASOUND IMAGING | Age: 63
End: 2023-03-02
Payer: OTHER GOVERNMENT

## 2023-03-02 ENCOUNTER — HOSPITAL ENCOUNTER (EMERGENCY)
Age: 63
Discharge: HOME | End: 2023-03-02
Attending: EMERGENCY MEDICINE
Payer: OTHER GOVERNMENT

## 2023-03-02 VITALS
SYSTOLIC BLOOD PRESSURE: 124 MMHG | HEIGHT: 74 IN | HEART RATE: 88 BPM | WEIGHT: 162 LBS | TEMPERATURE: 97.8 F | BODY MASS INDEX: 20.79 KG/M2 | DIASTOLIC BLOOD PRESSURE: 90 MMHG | OXYGEN SATURATION: 99 % | RESPIRATION RATE: 16 BRPM

## 2023-03-02 DIAGNOSIS — Z96.0 STATUS POST INSERTION OF PENILE IMPLANT: Primary | ICD-10-CM

## 2023-03-02 LAB
ALBUMIN SERPL BCG-MCNC: 3.9 G/DL (ref 3.5–5.1)
ALP SERPL-CCNC: 135 U/L (ref 38–126)
ALT SERPL W/O P-5'-P-CCNC: 13 U/L (ref 11–66)
AMPHETAMINES UR QL SCN: NEGATIVE
ANION GAP SERPL CALC-SCNC: 11 MEQ/L (ref 8–16)
AST SERPL-CCNC: 26 U/L (ref 5–40)
BARBITURATES UR QL SCN: NEGATIVE
BASOPHILS ABSOLUTE: 0.1 THOU/MM3 (ref 0–0.1)
BASOPHILS NFR BLD AUTO: 0.9 %
BENZODIAZ UR QL SCN: NEGATIVE
BILIRUB SERPL-MCNC: 0.7 MG/DL (ref 0.3–1.2)
BILIRUB UR QL STRIP.AUTO: NEGATIVE
BUN SERPL-MCNC: 18 MG/DL (ref 7–22)
BZE UR QL SCN: POSITIVE
CALCIUM SERPL-MCNC: 9 MG/DL (ref 8.5–10.5)
CANNABINOIDS UR QL SCN: NEGATIVE
CHARACTER UR: CLEAR
CHLORIDE SERPL-SCNC: 100 MEQ/L (ref 98–111)
CO2 SERPL-SCNC: 25 MEQ/L (ref 23–33)
COLOR: YELLOW
CREAT SERPL-MCNC: 1 MG/DL (ref 0.4–1.2)
DEPRECATED RDW RBC AUTO: 47 FL (ref 35–45)
EOSINOPHIL NFR BLD AUTO: 10.5 %
EOSINOPHILS ABSOLUTE: 0.8 THOU/MM3 (ref 0–0.4)
ERYTHROCYTE [DISTWIDTH] IN BLOOD BY AUTOMATED COUNT: 14.7 % (ref 11.5–14.5)
FENTANYL: NEGATIVE
GFR SERPL CREATININE-BSD FRML MDRD: > 60 ML/MIN/1.73M2
GLUCOSE SERPL-MCNC: 73 MG/DL (ref 70–108)
GLUCOSE UR QL STRIP.AUTO: NEGATIVE MG/DL
HCT VFR BLD AUTO: 44.3 % (ref 42–52)
HGB BLD-MCNC: 14.6 GM/DL (ref 14–18)
HGB UR QL STRIP.AUTO: NEGATIVE
IMM GRANULOCYTES # BLD AUTO: 0.01 THOU/MM3 (ref 0–0.07)
IMM GRANULOCYTES NFR BLD AUTO: 0.1 %
KETONES UR QL STRIP.AUTO: ABNORMAL
LYMPHOCYTES ABSOLUTE: 3 THOU/MM3 (ref 1–4.8)
LYMPHOCYTES NFR BLD AUTO: 40.3 %
MAGNESIUM SERPL-MCNC: 2.1 MG/DL (ref 1.6–2.4)
MCH RBC QN AUTO: 28.6 PG (ref 26–33)
MCHC RBC AUTO-ENTMCNC: 33 GM/DL (ref 32.2–35.5)
MCV RBC AUTO: 86.9 FL (ref 80–94)
MONOCYTES ABSOLUTE: 0.7 THOU/MM3 (ref 0.4–1.3)
MONOCYTES NFR BLD AUTO: 9.4 %
NEUTROPHILS NFR BLD AUTO: 38.8 %
NITRITE UR QL STRIP: NEGATIVE
NRBC BLD AUTO-RTO: 0 /100 WBC
OPIATES UR QL SCN: NEGATIVE
OSMOLALITY SERPL CALC.SUM OF ELEC: 272.4 MOSMOL/KG (ref 275–300)
OXYCODONE: NEGATIVE
PCP UR QL SCN: NEGATIVE
PH UR STRIP.AUTO: 6 [PH] (ref 5–9)
PLATELET # BLD AUTO: 314 THOU/MM3 (ref 130–400)
PMV BLD AUTO: 9.6 FL (ref 9.4–12.4)
POTASSIUM SERPL-SCNC: 4.6 MEQ/L (ref 3.5–5.2)
PROT SERPL-MCNC: 7.2 G/DL (ref 6.1–8)
PROT UR STRIP.AUTO-MCNC: NEGATIVE MG/DL
RBC # BLD AUTO: 5.1 MILL/MM3 (ref 4.7–6.1)
SEGMENTED NEUTROPHILS ABSOLUTE COUNT: 2.9 THOU/MM3 (ref 1.8–7.7)
SODIUM SERPL-SCNC: 136 MEQ/L (ref 135–145)
SP GR UR REFRACT.AUTO: 1.03 (ref 1–1.03)
UROBILINOGEN, URINE: 1 EU/DL (ref 0–1)
WBC # BLD AUTO: 7.5 THOU/MM3 (ref 4.8–10.8)
WBC #/AREA URNS HPF: NEGATIVE /[HPF]

## 2023-03-02 PROCEDURE — 83735 ASSAY OF MAGNESIUM: CPT

## 2023-03-02 PROCEDURE — 6370000000 HC RX 637 (ALT 250 FOR IP): Performed by: EMERGENCY MEDICINE

## 2023-03-02 PROCEDURE — 81003 URINALYSIS AUTO W/O SCOPE: CPT

## 2023-03-02 PROCEDURE — 85025 COMPLETE CBC W/AUTO DIFF WBC: CPT

## 2023-03-02 PROCEDURE — 80307 DRUG TEST PRSMV CHEM ANLYZR: CPT

## 2023-03-02 PROCEDURE — 99284 EMERGENCY DEPT VISIT MOD MDM: CPT

## 2023-03-02 PROCEDURE — 80053 COMPREHEN METABOLIC PANEL: CPT

## 2023-03-02 PROCEDURE — 87040 BLOOD CULTURE FOR BACTERIA: CPT

## 2023-03-02 PROCEDURE — 36415 COLL VENOUS BLD VENIPUNCTURE: CPT

## 2023-03-02 PROCEDURE — 76870 US EXAM SCROTUM: CPT

## 2023-03-02 RX ORDER — CEPHALEXIN 500 MG/1
500 CAPSULE ORAL 4 TIMES DAILY
Qty: 28 CAPSULE | Refills: 0 | Status: SHIPPED | OUTPATIENT
Start: 2023-03-02 | End: 2023-03-09

## 2023-03-02 RX ORDER — HYDROCODONE BITARTRATE AND ACETAMINOPHEN 5; 325 MG/1; MG/1
1 TABLET ORAL EVERY 8 HOURS PRN
Qty: 9 TABLET | Refills: 0 | Status: SHIPPED | OUTPATIENT
Start: 2023-03-02 | End: 2023-03-05

## 2023-03-02 RX ORDER — HYDROCODONE BITARTRATE AND ACETAMINOPHEN 5; 325 MG/1; MG/1
1 TABLET ORAL ONCE
Status: COMPLETED | OUTPATIENT
Start: 2023-03-02 | End: 2023-03-02

## 2023-03-02 RX ADMIN — HYDROCODONE BITARTRATE AND ACETAMINOPHEN 1 TABLET: 5; 325 TABLET ORAL at 00:55

## 2023-03-02 ASSESSMENT — ENCOUNTER SYMPTOMS
CONSTIPATION: 0
TROUBLE SWALLOWING: 0
EYE REDNESS: 0
ABDOMINAL DISTENTION: 0
EYE PAIN: 0
SORE THROAT: 0
VOMITING: 0
SINUS PRESSURE: 0
CHEST TIGHTNESS: 0
EYE ITCHING: 0
DIARRHEA: 0
EYE DISCHARGE: 0
WHEEZING: 0
VOICE CHANGE: 0
RHINORRHEA: 0
BLOOD IN STOOL: 0
COUGH: 0
BACK PAIN: 0
NAUSEA: 0
ABDOMINAL PAIN: 0
SHORTNESS OF BREATH: 0
PHOTOPHOBIA: 0
CHOKING: 0

## 2023-03-02 ASSESSMENT — PAIN DESCRIPTION - LOCATION
LOCATION: PENIS
LOCATION: PENIS

## 2023-03-02 ASSESSMENT — PAIN DESCRIPTION - DESCRIPTORS: DESCRIPTORS: SHARP

## 2023-03-02 ASSESSMENT — PAIN SCALES - GENERAL
PAINLEVEL_OUTOF10: 3
PAINLEVEL_OUTOF10: 10

## 2023-03-02 ASSESSMENT — PAIN - FUNCTIONAL ASSESSMENT: PAIN_FUNCTIONAL_ASSESSMENT: 0-10

## 2023-03-02 NOTE — DISCHARGE INSTRUCTIONS
Patient has pain status post penile implant. Patient is instructed to follow-up with his urologist and call for an appointment within the next 1 to 2 days. Patient has been given a short course of pain medication and an additional antibiotics he is instructed to take those as prescribed. He is instructed return to the nearest emergency room immediately for any new or worsening complaints.

## 2023-03-02 NOTE — ED PROVIDER NOTES
[facility]  eMERGENCY dEPARTMENT eNCOUnter          CHIEF COMPLAINT       Chief Complaint   Patient presents with    Post-op Problem       Nurses Notes reviewed and I agree except as noted in the HPI. HISTORY OF PRESENT ILLNESS    Leonor Duckworth is a 58 y.o. male who presents for testicular pain. Apparently the patient had a penile implant put in by Dr. Katiuska Barrett on 2/20/2023. Patient states he has had postsurgical pain since that time. States he is out of his pain medication. Patient states he thinks it might be still infected. Patient was on Keflex as well as on pain medication. Patient comes in tonight complaining of same. Patient denies any bleeding, no discharge, no problems with erections at this time. Patient is complaining about testicular pain and mild swelling. Patient denies any fevers chills or sweats. He denies any abdominal pain or change in bowel or bladder habits. REVIEW OF SYSTEMS     Review of Systems   Constitutional:  Negative for activity change, appetite change, diaphoresis, fatigue and unexpected weight change. HENT:  Negative for congestion, ear discharge, ear pain, hearing loss, rhinorrhea, sinus pressure, sore throat, trouble swallowing and voice change. Eyes:  Negative for photophobia, pain, discharge, redness and itching. Respiratory:  Negative for cough, choking, chest tightness, shortness of breath and wheezing. Cardiovascular:  Negative for chest pain, palpitations and leg swelling. Gastrointestinal:  Negative for abdominal distention, abdominal pain, blood in stool, constipation, diarrhea, nausea and vomiting. Endocrine: Negative for polydipsia, polyphagia and polyuria. Genitourinary:  Positive for scrotal swelling and testicular pain. Negative for decreased urine volume, difficulty urinating, dysuria, enuresis, flank pain, frequency, genital sores, hematuria, penile discharge, penile pain, penile swelling and urgency. Musculoskeletal:  Negative for arthralgias, back pain, gait problem, myalgias, neck pain and neck stiffness. Skin:  Negative for pallor and rash. Allergic/Immunologic: Negative for immunocompromised state. Neurological:  Negative for dizziness, tremors, seizures, syncope, facial asymmetry, weakness, light-headedness, numbness and headaches. Hematological:  Negative for adenopathy. Does not bruise/bleed easily. Psychiatric/Behavioral:  Negative for agitation, confusion, decreased concentration, hallucinations and suicidal ideas. The patient is not nervous/anxious. PAST MEDICAL HISTORY    has a past medical history of Arthritis, Bipolar 1 disorder ([facility] [address] COPD (chronic obstructive pulmonary disease) ([facility] [address] Hypertension, and Lumbar radiculopathy. SURGICAL HISTORY      has a past surgical history that includes Foot surgery (Right); Tonsillectomy; Colonoscopy (Left, 6/21/2019); Upper gastrointestinal endoscopy (Left, 6/21/2019); Pain management procedure (Right, 2/8/2021); hip surgery (Right, 3/15/2021); Pain management procedure (Right, 4/20/2021); Pain management procedure (Right, 6/22/2021); and Penile prosthesis placement (N/A, 2/20/2023).     CURRENT MEDICATIONS       Discharge Medication List as of 3/2/2023  3:41 AM        CONTINUE these medications which have NOT CHANGED    Details   docusate sodium (COLACE) 100 MG capsule Take 1 capsule by mouth 2 times daily, Disp-60 capsule, R-0Normal      tamsulosin (FLOMAX) 0.4 mg capsule Take 1 capsule by mouth daily Take one capsule daily to facilitate passage of ureteral stone, Disp-90 capsule, R-3Normal      lidocaine (LIDODERM) 5 % Place 1 patch onto the skin daily 12 hours on, 12 hours off., Disp-30 patch, R-0Normal      ibuprofen (ADVIL;MOTRIN) 800 MG tablet Take 1 tablet by mouth every 8 hours as needed for Pain, Disp-30 tablet, R-0Normal      gabapentin (NEURONTIN) 300 MG capsule take 1 capsule by mouth twice a day, Disp-60 capsule, R-0Normal lactase (LACTAID) 3000 units tablet Take 1 tablet by mouth 3 times daily (with meals), Disp-30 tablet, R-0Print      Calcium Polycarbophil (FIBER) 625 MG TABS Take 1 tablet by mouth daily, Disp-14 tablet, R-0Print      FLUoxetine (PROZAC) 20 MG capsule Take 20 mg by mouth daily. metoprolol (TOPROL-XL) 50 MG XL tablet Take 50 mg by mouth daily. ALLERGIES     has No Known Allergies. FAMILY HISTORY     He indicated that his mother is alive. He indicated that his father is . family history includes Diabetes in his father and mother. SOCIAL HISTORY      reports that he has been smoking cigarettes. He started smoking about 39 years ago. He has a 7.50 pack-year smoking history. He has never used smokeless tobacco. He reports current alcohol use. He reports that he does not use drugs. PHYSICAL EXAM     INITIAL VITALS:  height is 6' 2\" (1.88 m) and weight is 162 lb (73.5 kg). His axillary temperature is 97.8 °F (36.6 °C). His blood pressure is 124/90 (abnormal) and his pulse is 88. His respiration is 16 and oxygen saturation is 99%. Physical Exam  Vitals and nursing note reviewed. Exam conducted with a chaperone present. Constitutional:       General: He is not in acute distress. Appearance: He is well-developed. He is not diaphoretic. HENT:      Head: Normocephalic and atraumatic. Right Ear: External ear normal.      Left Ear: External ear normal.      Nose: Nose normal.      Mouth/Throat:      Mouth: Mucous membranes are moist.      Pharynx: Oropharynx is clear. Eyes:      General: Lids are normal. No scleral icterus. Right eye: No discharge. Left eye: No discharge. Conjunctiva/sclera: Conjunctivae normal.      Right eye: No exudate. Left eye: No exudate. Pupils: Pupils are equal, round, and reactive to light. Neck:      Thyroid: No thyromegaly. Vascular: No JVD. Trachea: No tracheal deviation.    Cardiovascular:      Rate and Rhythm: Normal rate and regular rhythm. Pulses: Normal pulses. Heart sounds: Normal heart sounds, S1 normal and S2 normal. No murmur heard. No friction rub. No gallop. Pulmonary:      Effort: Pulmonary effort is normal. No respiratory distress. Breath sounds: Normal breath sounds. No stridor. No wheezing or rales. Chest:      Chest wall: No tenderness. Abdominal:      General: Bowel sounds are normal. There is no distension. Palpations: Abdomen is soft. There is no mass. Tenderness: There is no abdominal tenderness. There is no guarding or rebound. Genitourinary:     Penis: Normal and circumcised. Testes:         Right: Tenderness present. Mass, swelling, testicular hydrocele or varicocele not present. Right testis is descended. Cremasteric reflex is present. Left: Tenderness present. Mass, swelling, testicular hydrocele or varicocele not present. Left testis is descended. Cremasteric reflex is present. Comments: Wound appears well-healed, no discharge blood or signs of inflammation. Musculoskeletal:         General: No tenderness. Normal range of motion. Cervical back: Normal range of motion and neck supple. Normal range of motion. Lymphadenopathy:      Cervical: No cervical adenopathy. Skin:     General: Skin is warm and dry. Capillary Refill: Capillary refill takes less than 2 seconds. Findings: No bruising, ecchymosis, lesion or rash. Neurological:      General: No focal deficit present. Mental Status: He is alert and oriented to person, place, and time. Mental status is at baseline. Cranial Nerves: No cranial nerve deficit. Sensory: No sensory deficit. Motor: No weakness. Coordination: Coordination normal.      Gait: Gait normal.      Deep Tendon Reflexes: Reflexes are normal and symmetric.  Reflexes normal.   Psychiatric:         Speech: Speech normal.         Behavior: Behavior normal.         Thought Content: Thought content normal.         Judgment: Judgment normal.         DIFFERENTIAL DIAGNOSIS:   Chronic pain syndrome, infected implant, less likely testicular injury    DIAGNOSTIC RESULTS     EKG: All EKG's are interpreted by the Emergency Department Physician who either signs or Co-signs this chart in the absence of a cardiologist.  None    RADIOLOGY: non-plain film images(s) such as CT, Ultrasound and MRI are read by the radiologist.  US SCROTUM AND TESTICLES   Final Result   Impression:   1. Normal evaluation of both testes. 2. Trace bilateral hydroceles. 2. Mild skin thickening/swelling at the level of the penile implant. This document has been electronically signed by: Jazmin Vivas MD on    03/02/2023 03:21 AM            LABS:   Labs Reviewed   URINALYSIS WITH REFLEX TO CULTURE - Abnormal; Notable for the following components:       Result Value    Ketones, Urine TRACE (*)     All other components within normal limits   CBC WITH AUTO DIFFERENTIAL - Abnormal; Notable for the following components:    RDW-CV 14.7 (*)     RDW-SD 47.0 (*)     Eosinophils Absolute 0.8 (*)     All other components within normal limits   COMPREHENSIVE METABOLIC PANEL - Abnormal; Notable for the following components:    Alkaline Phosphatase 135 (*)     All other components within normal limits   OSMOLALITY - Abnormal; Notable for the following components:    Osmolality Calc 272.4 (*)     All other components within normal limits   CULTURE, BLOOD 1   CULTURE, BLOOD 2   URINE DRUG SCREEN   MAGNESIUM   ANION GAP   GLOMERULAR FILTRATION RATE, ESTIMATED       EMERGENCY DEPARTMENT COURSE:   Vitals:    Vitals:    03/02/23 0036 03/02/23 0258   BP: (!) 127/96 (!) 124/90   Pulse: 94 88   Resp: 16 16   Temp: 97.8 °F (36.6 °C)    TempSrc: Axillary    SpO2: 99% 99%   Weight: 162 lb (73.5 kg)    Height: 6' 2\" (1.88 m)      Patient was assessed at bedside labs and imaging are ordered. Here today I reviewed all labs and imaging.   All within normal limits. There was no obvious signs of infection however I will give the patient some Keflex. He is given a short course of pain medication. He is instructed to follow-up with his urologist and call for an appointment within the next 1 to 2 days. This was discussed with the patient at bedside who understood and agreed to the plan. Patient is subsequently discharged home in stable condition. Patient has pain status post penile implant. Patient is instructed to follow-up with his urologist and call for an appointment within the next 1 to 2 days. Patient has been given a short course of pain medication and an additional antibiotics he is instructed to take those as prescribed. He is instructed return to the nearest emergency room immediately for any new or worsening complaints. CRITICAL CARE:   None    CONSULTS:  None    PROCEDURES:  None    FINAL IMPRESSION      1. Status post insertion of penile implant          DISPOSITION/PLAN   Discharge    PATIENT REFERRED TO:  Addie Wadsworth MD  [address] [phone]    Call in 1 day      DISCHARGE MEDICATIONS:  Discharge Medication List as of 3/2/2023  3:41 AM        START taking these medications    Details   HYDROcodone-acetaminophen (NORCO) 5-325 MG per tablet Take 1 tablet by mouth every 8 hours as needed for Pain for up to 3 days. Intended supply: 3 days.  Take lowest dose possible to manage pain Max Daily Amount: 3 tablets, Disp-9 tablet, R-0Print      cephALEXin (KEFLEX) 500 MG capsule Take 1 capsule by mouth 4 times daily for 7 days, Disp-28 capsule, R-0Print             (Please note that portions of this note were completed with a voice recognition program.  Efforts were made to edit the dictations but occasionally words are mis-transcribed.)    Arlyne Fabry, DO Timmy Meckel, DO  03/02/23 6050

## 2023-03-02 NOTE — ED NOTES
Pt provided lunch box and apple juice per request at this time. Pt remains alert and oriented, respirations are equal and unlabored.  Pt denies needs at this time, call light in reach, will continue to monitor      OBI ULRICH RN  03/02/23 0300

## 2023-03-02 NOTE — ED TRIAGE NOTES
Pt presents to the ED c/o post operative pain and a possible infection. Pt states that he had surgery last week Tuesday for a penile prosthesis and ran out of his percocets at home last Friday. pt additionally reports that he believes that he may have an infection due to burning with urination. Pt denies any blood in the urine and states that he is still able to urinate normally.  Pt is alert and oriented, respirations are equal and unlabored

## 2023-03-02 NOTE — ED NOTES
Pt resting on cot watching TV at this time, pt remains alert and oriented, respirations are equal and unlabored.  Pt denies needs at this time, will continue to monitor      OBI ULRICH RN  03/02/23 4859

## 2023-03-04 LAB
BACTERIA BLD AEROBE CULT: NORMAL
BACTERIA BLD AEROBE CULT: NORMAL

## 2023-03-24 ENCOUNTER — HOSPITAL ENCOUNTER (EMERGENCY)
Age: 63
Discharge: HOME | End: 2023-03-24
Attending: EMERGENCY MEDICINE
Payer: OTHER GOVERNMENT

## 2023-03-24 VITALS
DIASTOLIC BLOOD PRESSURE: 105 MMHG | WEIGHT: 189 LBS | HEART RATE: 111 BPM | BODY MASS INDEX: 24.26 KG/M2 | SYSTOLIC BLOOD PRESSURE: 128 MMHG | HEIGHT: 74 IN | OXYGEN SATURATION: 97 % | TEMPERATURE: 97.5 F | RESPIRATION RATE: 24 BRPM

## 2023-03-24 DIAGNOSIS — T83.410A FAILURE OF PENILE IMPLANT, INITIAL ENCOUNTER (HCC): Primary | ICD-10-CM

## 2023-03-24 PROCEDURE — 99284 EMERGENCY DEPT VISIT MOD MDM: CPT

## 2023-03-24 PROCEDURE — 96372 THER/PROPH/DIAG INJ SC/IM: CPT

## 2023-03-24 PROCEDURE — 6360000002 HC RX W HCPCS: Performed by: STUDENT IN AN ORGANIZED HEALTH CARE EDUCATION/TRAINING PROGRAM

## 2023-03-24 PROCEDURE — 6370000000 HC RX 637 (ALT 250 FOR IP): Performed by: STUDENT IN AN ORGANIZED HEALTH CARE EDUCATION/TRAINING PROGRAM

## 2023-03-24 RX ORDER — ACETAMINOPHEN 500 MG
1000 TABLET ORAL ONCE
Status: COMPLETED | OUTPATIENT
Start: 2023-03-24 | End: 2023-03-24

## 2023-03-24 RX ORDER — KETOROLAC TROMETHAMINE 30 MG/ML
30 INJECTION, SOLUTION INTRAMUSCULAR; INTRAVENOUS ONCE
Status: COMPLETED | OUTPATIENT
Start: 2023-03-24 | End: 2023-03-24

## 2023-03-24 RX ADMIN — KETOROLAC TROMETHAMINE 30 MG: 30 INJECTION, SOLUTION INTRAMUSCULAR at 04:52

## 2023-03-24 RX ADMIN — ACETAMINOPHEN 1000 MG: 500 TABLET ORAL at 04:52

## 2023-03-24 ASSESSMENT — PAIN - FUNCTIONAL ASSESSMENT: PAIN_FUNCTIONAL_ASSESSMENT: 0-10

## 2023-03-24 ASSESSMENT — PAIN SCALES - GENERAL: PAINLEVEL_OUTOF10: 10

## 2023-03-24 NOTE — ED TRIAGE NOTES
Pt presents to the ED with complaints of a penile implant malfunction. Pt states that he inflated it around 0200 yesterday and can not get it to go down. Pt states that the other problem he has had with it was that he had a post op infection after he got it place in February.

## 2023-03-24 NOTE — ED PROVIDER NOTES
2023    PENILE PROSTHESIS INSERTION performed by Ave Stearns MD at [facility]. [address] Left 2019    EGD BIOPSY performed by Ana Mccurdy MD at [address] [place] [address]       Previous Medications    CALCIUM POLYCARBOPHIL (FIBER) 625 MG TABS    Take 1 tablet by mouth daily    FLUOXETINE (PROZAC) 20 MG CAPSULE    Take 20 mg by mouth daily. GABAPENTIN (NEURONTIN) 300 MG CAPSULE    take 1 capsule by mouth twice a day    IBUPROFEN (ADVIL;MOTRIN) 800 MG TABLET    Take 1 tablet by mouth every 8 hours as needed for Pain    LACTASE (LACTAID) 3000 UNITS TABLET    Take 1 tablet by mouth 3 times daily (with meals)    LIDOCAINE (LIDODERM) 5 %    Place 1 patch onto the skin daily 12 hours on, 12 hours off. METOPROLOL (TOPROL-XL) 50 MG XL TABLET    Take 50 mg by mouth daily. TAMSULOSIN (FLOMAX) 0.4 MG CAPSULE    Take 1 capsule by mouth daily Take one capsule daily to facilitate passage of ureteral stone       ALLERGIES     has No Known Allergies. FAMILY HISTORY     He indicated that his mother is alive. He indicated that his father is . SOCIAL HISTORY       Social History     Tobacco Use    Smoking status: Every Day     Packs/day: 0.25     Years: 30.00     Pack years: 7.50     Types: Cigarettes     Start date: 1983    Smokeless tobacco: Never   Vaping Use    Vaping Use: Never used   Substance Use Topics    Alcohol use: Yes     Comment: occasionally    Drug use: No     Comment: hx of       PHYSICAL EXAM       ED Triage Vitals [23 0406]   BP Temp Temp Source Heart Rate Resp SpO2 Height Weight   (!) 128/105 97.5 °F (36.4 °C) Axillary (!) 111 24 97 % 6' 2\" (1.88 m) 189 lb (85.7 kg)       Physical Exam  Vitals and nursing note reviewed. Constitutional:       General: He is not in acute distress. Appearance: He is not toxic-appearing. HENT:      Head: Normocephalic and atraumatic.       Right Ear: External ear AM      OUTPATIENT FOLLOW UP THE PATIENT:  [address] [facility]  [address] [place] [address] Rd.  [address] [address]  Go to   Monday 8:15am    MD Gildardo Poole MD  Resident  03/24/23 0136

## 2023-03-24 NOTE — DISCHARGE INSTRUCTIONS
A urology appointment for Monday morning at 815 March 27 was made for a period take your medications as prescribed. Return to the emergency room for any new or worsening symptoms. You may take Tylenol Motrin as needed for pain control.

## 2023-05-22 ENCOUNTER — OFFICE VISIT (OUTPATIENT)
Dept: UROLOGY | Age: 63
End: 2023-05-22
Payer: OTHER GOVERNMENT

## 2023-05-22 VITALS — RESPIRATION RATE: 18 BRPM | HEIGHT: 74 IN | WEIGHT: 189 LBS | BODY MASS INDEX: 24.26 KG/M2

## 2023-05-22 DIAGNOSIS — N13.8 BPH WITH OBSTRUCTION/LOWER URINARY TRACT SYMPTOMS: Primary | ICD-10-CM

## 2023-05-22 DIAGNOSIS — N40.1 BPH WITH OBSTRUCTION/LOWER URINARY TRACT SYMPTOMS: Primary | ICD-10-CM

## 2023-05-22 PROCEDURE — 99213 OFFICE O/P EST LOW 20 MIN: CPT | Performed by: UROLOGY

## 2023-05-22 RX ORDER — GINSENG 100 MG
CAPSULE ORAL
Qty: 14 G | Refills: 0 | Status: SHIPPED | OUTPATIENT
Start: 2023-05-22 | End: 2023-06-01

## 2023-05-22 NOTE — PROGRESS NOTES
Dr. Jignesh Weaver MD MD  [facility] Consultation / New Patient Visit    Patient:  Bud Armas  YOB: 1960  Date: 5/22/2023  Consult requested from No primary care provider on file. HISTORY OF PRESENT ILLNESS:   The patient is a 58 y.o. male who presents today for follow-up for the following problem(s): ED  Overall the problem(s) : show no change. Associated Symptoms: No dysuria, gross hematuria. Pain Severity:      Today visit:   5/22/23    ED s/p IPP (2/2023) - Patient has failed to follow up after his procedure for education and activation. He has been in ER twice for not knowing how to drain the prosthesis. We discuss and educate today. Healed well. BPH with LUTs -  On Tamsulosin, does not like taking it cause it causes decreased libido and retrograde ejaculation. Discussed risks and benefits. Summary of old records:   (Patient's old records, notes and chart reviewed and summarized above.)    Cystoscopy Operative Note  Surgeon: Jignesh Weaver MD   Anesthesia: Urethral 2%  Indications: BPH  Position: supine  Findings:   The patient was prepped and draped in the usual sterile fashion. The flexible cystoscope was advanced through the urethra and into the bladder. The bladder was thoroughly inspected and the following was noted:    Residual Urine: Significant  Urethra: No abnormalities of the urethra are noted. Prostate: Medium gland (< 80 gm) Complete obstruction by lateral lobe of prostate. Bladder: No tumors or CIS noted. No bladder diverticulum. Moderate trabeculation noted. Ureters: Clear efflux from both ureters. Orifices with normal configuration and location. The cystoscope was removed. The patient tolerated the procedure well. Plan: good candidate for Urolift        Bud Heal f/u today for Erectile Dysfunction. Previous patient of Dr. Maximilian Fitzpatrick.    He remains on Trimix injections which are working well for him, however he is

## 2023-07-17 ENCOUNTER — HOSPITAL ENCOUNTER (OUTPATIENT)
Dept: MRI IMAGING | Age: 63
Discharge: HOME | End: 2023-07-17
Payer: OTHER GOVERNMENT

## 2023-07-17 DIAGNOSIS — M51.36 OTHER INTERVERTEBRAL DISC DEGENERATION, LUMBAR REGION: ICD-10-CM

## 2023-07-17 LAB — POC CREATININE WHOLE BLOOD: 1.2 MG/DL (ref 0.5–1.2)

## 2023-07-17 PROCEDURE — 82565 ASSAY OF CREATININE: CPT

## 2023-07-17 PROCEDURE — 72158 MRI LUMBAR SPINE W/O & W/DYE: CPT

## 2023-07-17 PROCEDURE — A9579 GAD-BASE MR CONTRAST NOS,1ML: HCPCS | Performed by: PHYSICIAN ASSISTANT

## 2023-07-17 PROCEDURE — 6360000004 HC RX CONTRAST MEDICATION: Performed by: PHYSICIAN ASSISTANT

## 2023-07-17 RX ADMIN — GADOTERIDOL 15 ML: 279.3 INJECTION, SOLUTION INTRAVENOUS at 14:36

## 2023-12-08 ENCOUNTER — TELEPHONE (OUTPATIENT)
Dept: UROLOGY | Age: 63
End: 2023-12-08

## 2023-12-08 RX ORDER — TAMSULOSIN HYDROCHLORIDE 0.4 MG/1
0.4 CAPSULE ORAL DAILY
Qty: 30 CAPSULE | Refills: 0 | Status: SHIPPED | OUTPATIENT
Start: 2023-12-08

## 2023-12-08 NOTE — TELEPHONE ENCOUNTER
Pt came into the office today and scheduled an appt with Lane Casey for 12/15/23 and requested a refill of his tamsulosin (FLOMAX) 0.4 mg capsule ()  to be sent over to the [facility] on [address]. Pt doesn't have anymore pills.

## 2023-12-21 ENCOUNTER — HOSPITAL ENCOUNTER (EMERGENCY)
Age: 63
Discharge: HOME | End: 2023-12-21
Payer: OTHER GOVERNMENT

## 2023-12-21 VITALS
DIASTOLIC BLOOD PRESSURE: 91 MMHG | RESPIRATION RATE: 17 BRPM | HEART RATE: 87 BPM | BODY MASS INDEX: 22.97 KG/M2 | WEIGHT: 179 LBS | SYSTOLIC BLOOD PRESSURE: 139 MMHG | TEMPERATURE: 97.8 F | HEIGHT: 74 IN | OXYGEN SATURATION: 99 %

## 2023-12-21 DIAGNOSIS — M48.061 SPINAL STENOSIS OF LUMBAR REGION WITHOUT NEUROGENIC CLAUDICATION: Primary | ICD-10-CM

## 2023-12-21 PROCEDURE — 99283 EMERGENCY DEPT VISIT LOW MDM: CPT

## 2023-12-21 PROCEDURE — 6370000000 HC RX 637 (ALT 250 FOR IP): Performed by: NURSE PRACTITIONER

## 2023-12-21 RX ORDER — METHYLPREDNISOLONE 4 MG/1
TABLET ORAL
Qty: 1 KIT | Refills: 0 | Status: SHIPPED | OUTPATIENT
Start: 2023-12-21 | End: 2023-12-27

## 2023-12-21 RX ORDER — HYDROCODONE BITARTRATE AND ACETAMINOPHEN 5; 325 MG/1; MG/1
1 TABLET ORAL ONCE
Status: COMPLETED | OUTPATIENT
Start: 2023-12-21 | End: 2023-12-21

## 2023-12-21 RX ADMIN — HYDROCODONE BITARTRATE AND ACETAMINOPHEN 1 TABLET: 5; 325 TABLET ORAL at 05:10

## 2023-12-21 NOTE — ED TRIAGE NOTES
Pt arrives to ED for c/o chronic lower back pain. Pt states he gets steroid shots in his back and is due for one. Pt states he takes gabapentin at home but it is not helping tonight. Pt is requesting [place] and steroids.

## 2023-12-21 NOTE — DISCHARGE INSTR - COC
Continuity of Care Form    Patient Name: Wilfrido Becerra   :  1960  MRN:  [id number]    Admit date:  2023  Discharge date:  ***    Code Status Order: Prior   Advance Directives:     Admitting Physician:  No admitting provider for patient encounter. PCP: No primary care provider on file. Discharging Nurse: [facility] Unit/Room#: 22022A  Discharging Unit Phone Number: ***    Emergency Contact:   No emergency contact information on file.     Past Surgical History:  Past Surgical History:   Procedure Laterality Date    COLONOSCOPY Left 2019    COLONOSCOPY DIAGNOSTIC performed by Colette Romero MD at [address] Right     HIP SURGERY Right 3/15/2021    right hip steroid injection no sedation performed by Brock Castro MD at [address] Right 2021    RIGHT TFLESI @ L4 and L5. performed by Brock Castro MD at [address] Right 2021    LESI @L4#1 right performed by Brock Castro MD at [address] Right 2021    right LESI # 2 @ L3 performed by Brock Castro MD at [address] [facility] [address] N/A 2023    PENILE PROSTHESIS INSERTION performed by Sally Riedel., MD at [address] Left 2019    EGD BIOPSY performed by Colette Romero MD at [facility]       Immunization History:   Immunization History   Administered Date(s) Administered    COVID-19, J&J, (age 18y+), IM, 0.5 mL 03/10/2021, 2021    COVID-19, MODERNA Bivalent, (age 12y+), IM, 48 mcg/0.5 mL 2022    Hep A-Hep B, Shannan Valdez, (age 18y+), IM, 1mL 2018    Influenza A (U4T3-27) Vaccine PF IM 2009    Influenza, FLUCELVAX, (age 10 mo+), MDCK, PF, 0.5mL 2020    TDaP, ADACEL (age 6y-58y), BOOSTRIX (age 10y+), IM, 0.5mL 2018

## 2023-12-21 NOTE — DISCHARGE INSTRUCTIONS
Call today or tomorrow to follow up with No primary care provider on file. in 3 days. Use an ice pack or bag filled with ice and apply to the injured area 3 - 4 times a day for 15 - 20 minutes each time. If the injury is older than 3 days, then use a heating pad to help relax the muscles in your back. Use ibuprofen or Tylenol (unless prescribed medications that have Tylenol in it) for pain. You can take over the counter Ibuprofen (advil) tablets (4 tablets every 8 hours or 3 tablets every 6 hours or 2 tablets every 4 hours)    Joe' Flexion Exercises     1. Pelvic tilt. Lie on your back with knees bent, feet flat on floor. Flatten the small of your back against the floor, without pushing down with the legs. Hold for 5 to 10 seconds. 2. Single Knee to chest. Lie on your back with knees bent and feet flat on the floor. Slowly pull your right knee toward your shoulder and hold 5 to 10 seconds. Lower the knee and repeat with the other knee. 3. Double knee to chest. Begin as in the previous exercise. After pulling right knee to chest, pull left knee to chest and hold both knees for 5 to 10 seconds. Slowly lower one leg at a time. 4. Partial sit-up. Do the pelvic tilt (exercise 1) and, while holding this position, slowly curl your head and shoulders off the floor. Hold briefly. Return slowly to the starting position. 5. Hamstring stretch. Start in long sitting with toes directed toward the ceiling and knees fully extended. Slowly lower the trunk forward over the legs, keeping knees extended, arms outstretched over the legs, and eyes focus ahead. 6. Hip Flexor stretch. Place one foot in front of the other with the left (front) knee flexed and the right (back) knee held rigidly straight. Flex forward through the trunk until the left knee contacts the axillary fold (arm pit region). Repeat with right leg forward and left leg back. 7. Squat.  Stand with both feet parallel, about shoulder's

## 2024-02-01 ENCOUNTER — OFFICE VISIT (OUTPATIENT)
Dept: UROLOGY | Age: 64
End: 2024-02-01
Payer: OTHER GOVERNMENT

## 2024-02-01 VITALS — WEIGHT: 179 LBS | HEIGHT: 74 IN | RESPIRATION RATE: 16 BRPM | BODY MASS INDEX: 22.97 KG/M2

## 2024-02-01 DIAGNOSIS — N52.01 ERECTILE DYSFUNCTION DUE TO ARTERIAL INSUFFICIENCY: ICD-10-CM

## 2024-02-01 DIAGNOSIS — N13.8 BPH WITH OBSTRUCTION/LOWER URINARY TRACT SYMPTOMS: Primary | ICD-10-CM

## 2024-02-01 DIAGNOSIS — N40.1 BENIGN LOCALIZED PROSTATIC HYPERPLASIA WITH LOWER URINARY TRACT SYMPTOMS (LUTS): ICD-10-CM

## 2024-02-01 DIAGNOSIS — N40.1 BPH WITH OBSTRUCTION/LOWER URINARY TRACT SYMPTOMS: Primary | ICD-10-CM

## 2024-02-01 PROCEDURE — 99214 OFFICE O/P EST MOD 30 MIN: CPT | Performed by: UROLOGY

## 2024-02-01 RX ORDER — TAMSULOSIN HYDROCHLORIDE 0.4 MG/1
0.4 CAPSULE ORAL 2 TIMES DAILY
Qty: 90 CAPSULE | Refills: 3 | Status: SHIPPED | OUTPATIENT
Start: 2024-02-01

## 2024-02-01 NOTE — PROGRESS NOTES
Dr. KIRILL ERICKSON MD MD      Patient:  Higinio Woodward  YOB: 1960  Date: 2/1/2024  Consult requested from No primary care provider on file.     HISTORY OF PRESENT ILLNESS:   The patient is a 63 y.o. male who presents today for follow-up for the following problem(s): ED  Overall the problem(s) : show no change.  Associated Symptoms: No dysuria, gross hematuria.   Pain Severity:      Today visit:   5/22/23    ED s/p IPP (2/2023) - Patient has failed to follow up after his procedure for education and activation.  He has been in ER twice for not knowing how to drain the prosthesis.  We discuss and educate today. Healed well.    BPH with LUTs -  On Tamsulosin, does not like taking it cause it causes decreased libido and retrograde ejaculation.  Discussed risks and benefits.     Summary of old records:   (Patient's old records, notes and chart reviewed and summarized above.)    Cystoscopy Operative Note  Surgeon: KIRILL ERICKSON MD   Anesthesia: Urethral 2%  Indications: BPH  Position: supine  Findings:   The patient was prepped and draped in the usual sterile fashion.  The flexible cystoscope was advanced through the urethra and into the bladder.  The bladder was thoroughly inspected and the following was noted:    Residual Urine: Significant  Urethra: No abnormalities of the urethra are noted.  Prostate: Medium gland (< 80 gm) Complete obstruction by lateral lobe of prostate.  Bladder: No tumors or CIS noted.  No bladder diverticulum.  Moderate trabeculation noted.  Ureters: Clear efflux from both ureters.  Orifices with normal configuration and location.  The cystoscope was removed.  The patient tolerated the procedure well.  Plan: good candidate for Urolift        Higinio Woodward f/u today for Erectile Dysfunction. Previous patient of Dr. Santos.   He remains on Trimix injections which are working well for him, however he is interested in vascular reconstructive surgery for his

## 2024-05-02 ENCOUNTER — OFFICE VISIT (OUTPATIENT)
Dept: UROLOGY | Age: 64
End: 2024-05-02
Payer: OTHER GOVERNMENT

## 2024-05-02 VITALS — HEIGHT: 74 IN | WEIGHT: 172 LBS | RESPIRATION RATE: 18 BRPM | BODY MASS INDEX: 22.07 KG/M2

## 2024-05-02 DIAGNOSIS — N52.01 ERECTILE DYSFUNCTION DUE TO ARTERIAL INSUFFICIENCY: ICD-10-CM

## 2024-05-02 DIAGNOSIS — N40.1 BPH WITH OBSTRUCTION/LOWER URINARY TRACT SYMPTOMS: Primary | ICD-10-CM

## 2024-05-02 DIAGNOSIS — N13.8 BPH WITH OBSTRUCTION/LOWER URINARY TRACT SYMPTOMS: Primary | ICD-10-CM

## 2024-05-02 PROCEDURE — 99214 OFFICE O/P EST MOD 30 MIN: CPT | Performed by: UROLOGY

## 2024-05-02 RX ORDER — CIPROFLOXACIN 500 MG/1
500 TABLET, FILM COATED ORAL 2 TIMES DAILY
Qty: 14 TABLET | Refills: 0 | Status: SHIPPED | OUTPATIENT
Start: 2024-05-02 | End: 2024-05-09

## 2024-05-03 NOTE — PROGRESS NOTES
Dr. Luis Fernando Olson Jr, MD MD      Patient:  Higinio Woodward  YOB: 1960  Date: 5/3/2024  Consult requested from No primary care provider on file.     HISTORY OF PRESENT ILLNESS:   The patient is a 63 y.o. male who presents today for follow-up for the following problem(s): ED  Overall the problem(s) : show no change.  Associated Symptoms: No dysuria, gross hematuria.   Pain Severity:      Today visit:   5/3/24    ED s/p IPP (2/2023) - Prosthesis working well, no signs of infection or erosion    BPH with LUTs -  On Tamsulosin (0.8 mg), does not like taking it cause it causes decreased libido and retrograde ejaculation.  Discussed other options but not interested in surgery.     Summary of old records:   (Patient's old records, notes and chart reviewed and summarized above.)    Cystoscopy Operative Note  Surgeon: Luis Fernando Olson Jr, MD   Anesthesia: Urethral 2%  Indications: BPH  Position: supine  Findings:   The patient was prepped and draped in the usual sterile fashion.  The flexible cystoscope was advanced through the urethra and into the bladder.  The bladder was thoroughly inspected and the following was noted:    Residual Urine: Significant  Urethra: No abnormalities of the urethra are noted.  Prostate: Medium gland (< 80 gm) Complete obstruction by lateral lobe of prostate.  Bladder: No tumors or CIS noted.  No bladder diverticulum.  Moderate trabeculation noted.  Ureters: Clear efflux from both ureters.  Orifices with normal configuration and location.  The cystoscope was removed.  The patient tolerated the procedure well.  Plan: good candidate for Urolift        Higinio Woodward f/u today for Erectile Dysfunction. Previous patient of Dr. Santos.   He remains on Trimix injections which are working well for him, however he is interested in vascular reconstructive surgery for his ED.  Unable to achieve erection without Trimix injection.       He comes in today by himself. Hx is obtained

## 2024-05-28 ENCOUNTER — HOSPITAL ENCOUNTER (EMERGENCY)
Age: 64
Discharge: HOME | End: 2024-05-28
Payer: OTHER GOVERNMENT

## 2024-05-28 ENCOUNTER — APPOINTMENT (OUTPATIENT)
Dept: GENERAL RADIOLOGY | Age: 64
End: 2024-05-28
Payer: OTHER GOVERNMENT

## 2024-05-28 VITALS
TEMPERATURE: 97.3 F | OXYGEN SATURATION: 97 % | HEIGHT: 73 IN | DIASTOLIC BLOOD PRESSURE: 98 MMHG | RESPIRATION RATE: 18 BRPM | BODY MASS INDEX: 21.2 KG/M2 | SYSTOLIC BLOOD PRESSURE: 163 MMHG | WEIGHT: 160 LBS | HEART RATE: 90 BPM

## 2024-05-28 DIAGNOSIS — M25.512 LEFT SHOULDER PAIN, UNSPECIFIED CHRONICITY: ICD-10-CM

## 2024-05-28 DIAGNOSIS — S46.002A INJURY OF LEFT ROTATOR CUFF, INITIAL ENCOUNTER: Primary | ICD-10-CM

## 2024-05-28 LAB
ANION GAP SERPL CALC-SCNC: 10 MEQ/L (ref 8–16)
BASOPHILS ABSOLUTE: 0.1 THOU/MM3 (ref 0–0.1)
BASOPHILS NFR BLD AUTO: 1.1 %
BUN SERPL-MCNC: 9 MG/DL (ref 7–22)
CALCIUM SERPL-MCNC: 9.4 MG/DL (ref 8.5–10.5)
CHLORIDE SERPL-SCNC: 101 MEQ/L (ref 98–111)
CO2 SERPL-SCNC: 28 MEQ/L (ref 23–33)
CREAT SERPL-MCNC: 0.9 MG/DL (ref 0.4–1.2)
DEPRECATED RDW RBC AUTO: 45.7 FL (ref 35–45)
EKG ATRIAL RATE: 76 BPM
EKG P AXIS: 65 DEGREES
EKG P-R INTERVAL: 158 MS
EKG Q-T INTERVAL: 376 MS
EKG QRS DURATION: 90 MS
EKG QTC CALCULATION (BAZETT): 423 MS
EKG R AXIS: 55 DEGREES
EKG T AXIS: 79 DEGREES
EKG VENTRICULAR RATE: 76 BPM
EOSINOPHIL NFR BLD AUTO: 9.5 %
EOSINOPHILS ABSOLUTE: 0.6 THOU/MM3 (ref 0–0.4)
ERYTHROCYTE [DISTWIDTH] IN BLOOD BY AUTOMATED COUNT: 14.2 % (ref 11.5–14.5)
GFR SERPL CREATININE-BSD FRML MDRD: > 90 ML/MIN/1.73M2
GLUCOSE SERPL-MCNC: 91 MG/DL (ref 70–108)
HCT VFR BLD AUTO: 47.2 % (ref 42–52)
HGB BLD-MCNC: 15.7 GM/DL (ref 14–18)
IMM GRANULOCYTES # BLD AUTO: 0.01 THOU/MM3 (ref 0–0.07)
IMM GRANULOCYTES NFR BLD AUTO: 0.2 %
LYMPHOCYTES ABSOLUTE: 3.3 THOU/MM3 (ref 1–4.8)
LYMPHOCYTES NFR BLD AUTO: 49.8 %
MCH RBC QN AUTO: 29.5 PG (ref 26–33)
MCHC RBC AUTO-ENTMCNC: 33.3 GM/DL (ref 32.2–35.5)
MCV RBC AUTO: 88.7 FL (ref 80–94)
MONOCYTES ABSOLUTE: 0.5 THOU/MM3 (ref 0.4–1.3)
MONOCYTES NFR BLD AUTO: 7.9 %
NEUTROPHILS ABSOLUTE: 2.1 THOU/MM3 (ref 1.8–7.7)
NEUTROPHILS NFR BLD AUTO: 31.5 %
NRBC BLD AUTO-RTO: 0 /100 WBC
OSMOLALITY SERPL CALC.SUM OF ELEC: 275.8 MOSMOL/KG (ref 275–300)
PLATELET # BLD AUTO: 279 THOU/MM3 (ref 130–400)
PMV BLD AUTO: 10.1 FL (ref 9.4–12.4)
POTASSIUM SERPL-SCNC: 4.5 MEQ/L (ref 3.5–5.2)
RBC # BLD AUTO: 5.32 MILL/MM3 (ref 4.7–6.1)
SODIUM SERPL-SCNC: 139 MEQ/L (ref 135–145)
TROPONIN, HIGH SENSITIVITY: 7 NG/L (ref 0–12)
TSH SERPL DL<=0.005 MIU/L-ACNC: 0.59 UIU/ML (ref 0.4–4.2)
WBC # BLD AUTO: 6.6 THOU/MM3 (ref 4.8–10.8)

## 2024-05-28 PROCEDURE — 96372 THER/PROPH/DIAG INJ SC/IM: CPT

## 2024-05-28 PROCEDURE — 6370000000 HC RX 637 (ALT 250 FOR IP): Performed by: NURSE PRACTITIONER

## 2024-05-28 PROCEDURE — 6360000002 HC RX W HCPCS: Performed by: NURSE PRACTITIONER

## 2024-05-28 PROCEDURE — 73030 X-RAY EXAM OF SHOULDER: CPT

## 2024-05-28 PROCEDURE — 99285 EMERGENCY DEPT VISIT HI MDM: CPT

## 2024-05-28 PROCEDURE — 93005 ELECTROCARDIOGRAM TRACING: CPT | Performed by: NURSE PRACTITIONER

## 2024-05-28 PROCEDURE — 80048 BASIC METABOLIC PNL TOTAL CA: CPT

## 2024-05-28 PROCEDURE — 85025 COMPLETE CBC W/AUTO DIFF WBC: CPT

## 2024-05-28 PROCEDURE — 36415 COLL VENOUS BLD VENIPUNCTURE: CPT

## 2024-05-28 PROCEDURE — 84443 ASSAY THYROID STIM HORMONE: CPT

## 2024-05-28 PROCEDURE — 84484 ASSAY OF TROPONIN QUANT: CPT

## 2024-05-28 RX ORDER — KETOROLAC TROMETHAMINE 30 MG/ML
30 INJECTION, SOLUTION INTRAMUSCULAR; INTRAVENOUS ONCE
Status: COMPLETED | OUTPATIENT
Start: 2024-05-28 | End: 2024-05-28

## 2024-05-28 RX ORDER — HYDROCODONE BITARTRATE AND ACETAMINOPHEN 5; 325 MG/1; MG/1
1 TABLET ORAL ONCE
Status: DISCONTINUED | OUTPATIENT
Start: 2024-05-28 | End: 2024-05-28 | Stop reason: HOSPADM

## 2024-05-28 RX ORDER — LIDOCAINE 4 G/G
1 PATCH TOPICAL ONCE
Status: DISCONTINUED | OUTPATIENT
Start: 2024-05-28 | End: 2024-05-28 | Stop reason: HOSPADM

## 2024-05-28 RX ADMIN — KETOROLAC TROMETHAMINE 30 MG: 30 INJECTION, SOLUTION INTRAMUSCULAR at 02:51

## 2024-05-28 ASSESSMENT — PAIN SCALES - GENERAL: PAINLEVEL_OUTOF10: 5

## 2024-05-28 NOTE — ED TRIAGE NOTES
PT to the ED with left shoulder pain ongoing for over a month per PT. PT states it is due to a past injury. PT states pain is with movement.

## 2024-05-31 NOTE — ED PROVIDER NOTES
MD Leydi at [facility] OR    PENILE PROSTHESIS PLACEMENT N/A 2023    PENILE PROSTHESIS INSERTION performed by Luis Fernando Olson Jr., MD at [facility] OR    TONSILLECTOMY      UPPER GASTROINTESTINAL ENDOSCOPY Left 2019    EGD BIOPSY performed by Cameron Jacome MD at [facility]       CURRENT MEDICATIONS       Discharge Medication List as of 2024  2:39 AM        CONTINUE these medications which have NOT CHANGED    Details   !! tamsulosin (FLOMAX) 0.4 MG capsule Take 1 capsule by mouth in the morning and at bedtime, Disp-90 capsule, R-3Normal      !! tamsulosin (FLOMAX) 0.4 MG capsule Take 1 capsule by mouth daily, Disp-30 capsule, R-0Normal      lidocaine (LIDODERM) 5 % Place 1 patch onto the skin daily 12 hours on, 12 hours off., Disp-30 patch, R-0Normal      ibuprofen (ADVIL;MOTRIN) 800 MG tablet Take 1 tablet by mouth every 8 hours as needed for Pain, Disp-30 tablet, R-0Normal      gabapentin (NEURONTIN) 300 MG capsule take 1 capsule by mouth twice a day, Disp-60 capsule, R-0Normal      lactase (LACTAID) 3000 units tablet Take 1 tablet by mouth 3 times daily (with meals), Disp-30 tablet, R-0Print      Calcium Polycarbophil (FIBER) 625 MG TABS Take 1 tablet by mouth daily, Disp-14 tablet, R-0Print      FLUoxetine (PROZAC) 20 MG capsule Take 1 capsule by mouth dailyHistorical Med      metoprolol (TOPROL-XL) 50 MG XL tablet Take 1 tablet by mouth dailyHistorical Med       !! - Potential duplicate medications found. Please discuss with provider.          ALLERGIES     has No Known Allergies.    FAMILY HISTORY     He indicated that his mother is alive. He indicated that his father is .       SOCIAL HISTORY       Social History     Tobacco Use    Smoking status: Every Day     Current packs/day: 0.25     Average packs/day: 0.2 packs/day for 41.1 years (10.3 ttl pk-yrs)     Types: Cigarettes     Start date: 1983    Smokeless tobacco: Never   Vaping Use    Vaping Use: Never used

## 2024-06-19 ENCOUNTER — HOSPITAL ENCOUNTER (OUTPATIENT)
Dept: CT IMAGING | Age: 64
Discharge: HOME | End: 2024-06-19
Attending: INTERNAL MEDICINE
Payer: OTHER GOVERNMENT

## 2024-06-19 DIAGNOSIS — Z72.0 TOBACCO ABUSE: ICD-10-CM

## 2024-06-19 DIAGNOSIS — F17.200 CURRENT SMOKER: ICD-10-CM

## 2024-06-19 PROCEDURE — 71271 CT THORAX LUNG CANCER SCR C-: CPT

## 2024-08-08 ENCOUNTER — HOSPITAL ENCOUNTER (OUTPATIENT)
Dept: MRI IMAGING | Age: 64
Discharge: HOME | End: 2024-08-08
Attending: INTERNAL MEDICINE
Payer: OTHER GOVERNMENT

## 2024-08-08 DIAGNOSIS — M25.512 LEFT SHOULDER PAIN, UNSPECIFIED CHRONICITY: ICD-10-CM

## 2024-08-08 PROCEDURE — 73221 MRI JOINT UPR EXTREM W/O DYE: CPT

## 2024-08-12 ENCOUNTER — HOSPITAL ENCOUNTER (EMERGENCY)
Age: 64
Discharge: HOME | End: 2024-08-12
Payer: OTHER GOVERNMENT

## 2024-08-12 VITALS
TEMPERATURE: 97.6 F | HEIGHT: 73 IN | OXYGEN SATURATION: 97 % | HEART RATE: 98 BPM | RESPIRATION RATE: 18 BRPM | DIASTOLIC BLOOD PRESSURE: 92 MMHG | BODY MASS INDEX: 23.06 KG/M2 | SYSTOLIC BLOOD PRESSURE: 131 MMHG | WEIGHT: 174 LBS

## 2024-08-12 DIAGNOSIS — M25.512 CHRONIC LEFT SHOULDER PAIN: Primary | ICD-10-CM

## 2024-08-12 DIAGNOSIS — G89.29 CHRONIC LEFT SHOULDER PAIN: Primary | ICD-10-CM

## 2024-08-12 DIAGNOSIS — M75.102 NONTRAUMATIC TEAR OF LEFT ROTATOR CUFF, UNSPECIFIED TEAR EXTENT: ICD-10-CM

## 2024-08-12 PROCEDURE — 6370000000 HC RX 637 (ALT 250 FOR IP): Performed by: PHYSICIAN ASSISTANT

## 2024-08-12 PROCEDURE — 99283 EMERGENCY DEPT VISIT LOW MDM: CPT

## 2024-08-12 RX ORDER — HYDROCODONE BITARTRATE AND ACETAMINOPHEN 5; 325 MG/1; MG/1
1 TABLET ORAL EVERY 6 HOURS PRN
Qty: 10 TABLET | Refills: 0 | Status: SHIPPED | OUTPATIENT
Start: 2024-08-12 | End: 2024-08-15

## 2024-08-12 RX ORDER — HYDROCODONE BITARTRATE AND ACETAMINOPHEN 5; 325 MG/1; MG/1
1 TABLET ORAL ONCE
Status: COMPLETED | OUTPATIENT
Start: 2024-08-12 | End: 2024-08-12

## 2024-08-12 RX ADMIN — HYDROCODONE BITARTRATE AND ACETAMINOPHEN 1 TABLET: 5; 325 TABLET ORAL at 01:39

## 2024-08-12 ASSESSMENT — PAIN SCALES - GENERAL
PAINLEVEL_OUTOF10: 10
PAINLEVEL_OUTOF10: 10

## 2024-08-12 ASSESSMENT — ENCOUNTER SYMPTOMS
BACK PAIN: 0
SHORTNESS OF BREATH: 0
NAUSEA: 0
VOMITING: 0

## 2024-08-12 ASSESSMENT — PAIN - FUNCTIONAL ASSESSMENT: PAIN_FUNCTIONAL_ASSESSMENT: 0-10

## 2024-08-12 NOTE — ED PROVIDER NOTES
[facility] EMERGENCY DEPT      Pt Name: Higinio Woodward  MRN: [id number]  Birthdate 1960  Date of evaluation: 8/12/2024  Provider: Samantha Del Rio PA-C    CHIEF COMPLAINT     No chief complaint on file.      Nurses Notes reviewed and I agree except as noted in the HPI.      HISTORY OF PRESENT ILLNESS    Higinio Woodward is a 64 y.o. male who presents through the lobby for left shoulder pain.  Patient has had pain in his left shoulder for the past 3 to 4 months.  He denies any trauma or known injury.  The patient is right-hand dominant.  The pain has been worse the last few days and Tylenol, ibuprofen, and gabapentin are not helping.  Patient is asking for a Vicodin in the ER which has helped in the past.  Patient had an MRI on 8-8 but does not know the results.  [facility]  Patient reports some numbness if he lays on his arm and reports decreased range of motion.  Patient denies chest pain, dyspnea, diaphoresis, fever, cough plaints.    Location/Symptom: Left shoulder pain  Timing/Onset: 3 to 4 months  Context/Setting: Spontaneous onset with no known trauma or injury  Quality: Sharp, aching  Duration: Constant  Modifying Factors: Worse with certain movements especially lifting the arm  Severity: Moderate    PROCEDURE: MRI SHOULDER LEFT WO CONTRAST 8/8/2024  FINDINGS:      ALIGNMENT: Anatomic.     MARROW SIGNAL/MARROW EDEMA/FRACTURE: No acute fracture.. Yellow marrow signal  and red marrow signal noted..     ROTATOR CUFF TENDONS:  1. Massive rotator cuff tear involving the supraspinatus and infraspinatus  tendons with retraction.  2. Moderate tendinosis of the subscapularis tendon.  3. Mild tendinosis of the teres minor tendon.     LONG HEAD BICEPS TENDON:  1. Mild tendinosis of the intra-articular portion of the long head of the biceps  tendon.     GLENOID LABRUM: Grossly intact on this non-arthrographic evaluation     ACROMIOCLAVICULAR ARTICULATION:  1. Moderate

## 2024-08-12 NOTE — ED TRIAGE NOTES
Pt present to ED from home with c/c of L shoulder and back pain. Pt states this is a chronic pain and gets epidural shots every 3-4 months and is due for one. Rates pain a 10/10. Pt is taking ibuprofen and tylenol for pain. Rr easy and unlabored. No distress noted.

## 2024-09-23 ENCOUNTER — HOSPITAL ENCOUNTER (EMERGENCY)
Age: 64
Discharge: HOME | End: 2024-09-23
Payer: OTHER GOVERNMENT

## 2024-09-23 VITALS
HEIGHT: 73 IN | TEMPERATURE: 97.5 F | RESPIRATION RATE: 16 BRPM | OXYGEN SATURATION: 96 % | HEART RATE: 72 BPM | WEIGHT: 179 LBS | DIASTOLIC BLOOD PRESSURE: 57 MMHG | BODY MASS INDEX: 23.72 KG/M2 | SYSTOLIC BLOOD PRESSURE: 138 MMHG

## 2024-09-23 DIAGNOSIS — M25.512 CHRONIC LEFT SHOULDER PAIN: Primary | ICD-10-CM

## 2024-09-23 DIAGNOSIS — G89.29 CHRONIC LEFT SHOULDER PAIN: Primary | ICD-10-CM

## 2024-09-23 PROCEDURE — 6370000000 HC RX 637 (ALT 250 FOR IP): Performed by: PHYSICIAN ASSISTANT

## 2024-09-23 PROCEDURE — 99283 EMERGENCY DEPT VISIT LOW MDM: CPT

## 2024-09-23 RX ORDER — HYDROCODONE BITARTRATE AND ACETAMINOPHEN 5; 325 MG/1; MG/1
1 TABLET ORAL EVERY 6 HOURS PRN
Qty: 4 TABLET | Refills: 0 | Status: SHIPPED | OUTPATIENT
Start: 2024-09-23 | End: 2024-09-26

## 2024-09-23 RX ORDER — HYDROCODONE BITARTRATE AND ACETAMINOPHEN 5; 325 MG/1; MG/1
1 TABLET ORAL ONCE
Status: COMPLETED | OUTPATIENT
Start: 2024-09-23 | End: 2024-09-23

## 2024-09-23 RX ADMIN — HYDROCODONE BITARTRATE AND ACETAMINOPHEN 1 TABLET: 5; 325 TABLET ORAL at 01:18

## 2024-09-23 ASSESSMENT — PAIN DESCRIPTION - LOCATION: LOCATION: SHOULDER

## 2024-09-23 ASSESSMENT — ENCOUNTER SYMPTOMS
SHORTNESS OF BREATH: 0
NAUSEA: 0
VOMITING: 0

## 2024-09-23 ASSESSMENT — PAIN SCALES - GENERAL: PAINLEVEL_OUTOF10: 9

## 2024-09-23 ASSESSMENT — PAIN DESCRIPTION - ORIENTATION: ORIENTATION: LEFT

## 2024-09-23 ASSESSMENT — PAIN - FUNCTIONAL ASSESSMENT: PAIN_FUNCTIONAL_ASSESSMENT: 0-10

## 2024-09-23 ASSESSMENT — PAIN DESCRIPTION - PAIN TYPE: TYPE: CHRONIC PAIN

## 2024-12-29 ENCOUNTER — HOSPITAL ENCOUNTER (EMERGENCY)
Age: 64
Discharge: HOME | End: 2024-12-29
Payer: OTHER GOVERNMENT

## 2024-12-29 VITALS
HEART RATE: 97 BPM | WEIGHT: 165 LBS | BODY MASS INDEX: 21.87 KG/M2 | HEIGHT: 73 IN | TEMPERATURE: 98 F | RESPIRATION RATE: 18 BRPM | OXYGEN SATURATION: 97 % | DIASTOLIC BLOOD PRESSURE: 81 MMHG | SYSTOLIC BLOOD PRESSURE: 160 MMHG

## 2024-12-29 DIAGNOSIS — M54.16 LUMBAR RADICULITIS: ICD-10-CM

## 2024-12-29 DIAGNOSIS — M54.50 ACUTE EXACERBATION OF CHRONIC LOW BACK PAIN: Primary | ICD-10-CM

## 2024-12-29 DIAGNOSIS — G89.29 ACUTE EXACERBATION OF CHRONIC LOW BACK PAIN: Primary | ICD-10-CM

## 2024-12-29 DIAGNOSIS — M12.812 ROTATOR CUFF TEAR ARTHROPATHY OF LEFT SHOULDER: ICD-10-CM

## 2024-12-29 DIAGNOSIS — M75.102 ROTATOR CUFF TEAR ARTHROPATHY OF LEFT SHOULDER: ICD-10-CM

## 2024-12-29 PROCEDURE — 6360000002 HC RX W HCPCS: Performed by: PHYSICIAN ASSISTANT

## 2024-12-29 PROCEDURE — 96372 THER/PROPH/DIAG INJ SC/IM: CPT

## 2024-12-29 PROCEDURE — 6370000000 HC RX 637 (ALT 250 FOR IP): Performed by: PHYSICIAN ASSISTANT

## 2024-12-29 PROCEDURE — 99284 EMERGENCY DEPT VISIT MOD MDM: CPT

## 2024-12-29 RX ORDER — METHYLPREDNISOLONE 4 MG/1
TABLET ORAL
Qty: 1 KIT | Refills: 0 | Status: SHIPPED | OUTPATIENT
Start: 2024-12-29 | End: 2025-01-04

## 2024-12-29 RX ORDER — HYDROCODONE BITARTRATE AND ACETAMINOPHEN 5; 325 MG/1; MG/1
1 TABLET ORAL ONCE
Status: COMPLETED | OUTPATIENT
Start: 2024-12-29 | End: 2024-12-29

## 2024-12-29 RX ORDER — HYDROCODONE BITARTRATE AND ACETAMINOPHEN 5; 325 MG/1; MG/1
1 TABLET ORAL EVERY 6 HOURS PRN
Qty: 20 TABLET | Refills: 0 | Status: SHIPPED | OUTPATIENT
Start: 2024-12-29 | End: 2025-01-03

## 2024-12-29 RX ORDER — DEXAMETHASONE SODIUM PHOSPHATE 4 MG/ML
10 INJECTION, SOLUTION INTRA-ARTICULAR; INTRALESIONAL; INTRAMUSCULAR; INTRAVENOUS; SOFT TISSUE ONCE
Status: COMPLETED | OUTPATIENT
Start: 2024-12-29 | End: 2024-12-29

## 2024-12-29 RX ADMIN — HYDROCODONE BITARTRATE AND ACETAMINOPHEN 1 TABLET: 5; 325 TABLET ORAL at 01:11

## 2024-12-29 RX ADMIN — DEXAMETHASONE SODIUM PHOSPHATE 10 MG: 4 INJECTION, SOLUTION INTRAMUSCULAR; INTRAVENOUS at 01:11

## 2024-12-29 ASSESSMENT — PAIN DESCRIPTION - LOCATION: LOCATION: BACK;SHOULDER

## 2024-12-29 ASSESSMENT — PAIN SCALES - GENERAL
PAINLEVEL_OUTOF10: 10
PAINLEVEL_OUTOF10: 10

## 2024-12-29 ASSESSMENT — PAIN - FUNCTIONAL ASSESSMENT: PAIN_FUNCTIONAL_ASSESSMENT: 0-10

## 2024-12-29 NOTE — ED PROVIDER NOTES
[facility] EMERGENCY DEPT      Pt Name: Higinio Woodward  MRN: [id number]  Birthdate 1960  Date of evaluation: 12/29/2024  Provider: Samantha Del Rio PA-C    CHIEF COMPLAINT       Chief Complaint   Patient presents with    Back Pain    Shoulder Pain       Nurses Notes reviewed and I agree except as noted in the HPI.      HISTORY OF PRESENT ILLNESS    Higinio Woodward is a 64 y.o. male who presents through the lobby dropped off by friend for pain.  Patient has chronic low back pain and receives epidural injections.  He tells me that he is overdue for an epidural and would like a shot of steroids and a prescription for \"a pack.\"  Additionally patient has chronic left shoulder pain due to a massive rotator cuff tear.  He [facility]  He tells me he has an appointment on January 3 with his PCP to be scheduled for surgery later that month.  Patient is here requesting a prescription for Norco as he is out.  The patient is right-hand dominant and denies any injuries.  The patient reports intermittent lower extremity paresthesias and numbness particularly when he walks.  His low back pain is worse with movement especially if he gets up from sitting or laying.  The patient denies numbness or tingling in his arm.  The patient denies incontinence of bowel or bladder, weakness, fever, abdominal pain, difficulty urinating, chest pain, dyspnea, or other complaints.    Location/Symptom: Low back pain, left shoulder pain  Timing/Onset: These are chronic problems that have worsened in the last 2 weeks  Context/Setting: Patient is overdue for an epidural shot and is out of his Norco  Quality: Aching  Duration: Constant  Modifying Factors: Worse with movement or changing positions  Severity: Moderate    MRI left shoulder without contrast 8-8-2024  IMPRESSION:    1. Massive rotator cuff tear involving the supraspinatus and infraspinatus  tendons with retraction.  2. Moderate tendinosis of the

## 2024-12-29 NOTE — ED TRIAGE NOTES
Pt presents to ED with chronic back and left shoulder pain per pt. Pt reports he gets pain shots regularly, but has not in two weeks. Pt states his shoulder pain is from a rotary cuff injury in the past. Pt denies taking any pain meds for the past two weeks. Pt denies N/V, dizziness, SOB, CP.

## 2024-12-31 ENCOUNTER — HOSPITAL ENCOUNTER (OUTPATIENT)
Dept: CT IMAGING | Age: 64
Discharge: HOME | End: 2024-12-31
Attending: INTERNAL MEDICINE
Payer: OTHER GOVERNMENT

## 2024-12-31 DIAGNOSIS — R91.1 SOLITARY PULMONARY NODULE: ICD-10-CM

## 2024-12-31 PROCEDURE — 71250 CT THORAX DX C-: CPT

## 2025-01-07 ENCOUNTER — TRANSCRIBE ORDERS (OUTPATIENT)
Dept: ADMINISTRATIVE | Age: 65
End: 2025-01-07

## 2025-01-07 DIAGNOSIS — M54.59 OTHER LOW BACK PAIN: Primary | ICD-10-CM

## 2025-01-13 ENCOUNTER — HOSPITAL ENCOUNTER (OUTPATIENT)
Dept: MRI IMAGING | Age: 65
Discharge: HOME | End: 2025-01-13
Attending: INTERNAL MEDICINE
Payer: OTHER GOVERNMENT

## 2025-01-13 DIAGNOSIS — M54.59 OTHER LOW BACK PAIN: ICD-10-CM

## 2025-01-13 PROCEDURE — 72148 MRI LUMBAR SPINE W/O DYE: CPT

## 2025-01-14 RX ORDER — GABAPENTIN 300 MG/1
CAPSULE ORAL
Qty: 60 CAPSULE | Refills: 0 | OUTPATIENT
Start: 2025-01-14 | End: 2026-04-12

## 2025-03-25 ENCOUNTER — HOSPITAL ENCOUNTER (OUTPATIENT)
Dept: SPEECH THERAPY | Age: 65
Setting detail: THERAPIES SERIES
Discharge: HOME | End: 2025-03-25
Payer: OTHER GOVERNMENT

## 2025-03-25 PROCEDURE — 92610 EVALUATE SWALLOWING FUNCTION: CPT | Performed by: SPEECH-LANGUAGE PATHOLOGIST

## 2025-04-03 NOTE — THERAPY DISCHARGE
[facility]  SPEECH THERAPY  [x] CLINICAL SWALLOW EVALUATION  [] DAILY NOTE   [] PROGRESS NOTE [x] DISCHARGE NOTE    [x] [facility] [place]   [] [facility] CARE [place]    [] [facility]   [] [facility]    Date: 3/25/2025  Patient Name:  Higinio Woodward  : 1960  MRN: [id number]  CSN: [id number]    Referring Practitioner Joaquina Jackson MD [id number]      Diagnosis  Diagnoses       R13.19 (ICD-10-CM) - Other dysphagia           Treatment Diagnosis R13.10 Dysphagia, unspecified   Date of Evaluation 3/25/25   Additional Pertinent History Higinio Woodward has a past medical history of Arthritis, Bipolar 1 disorder (HCC), COPD (chronic obstructive pulmonary disease) (HCC), Hypertension, and Lumbar radiculopathy.  he has a past surgical history that includes Foot surgery (Right); Tonsillectomy; Colonoscopy (Left, 2019); Upper gastrointestinal endoscopy (Left, 2019); Pain management procedure (Right, 2021); hip surgery (Right, 3/15/2021); Pain management procedure (Right, 2021); Pain management procedure (Right, 2021); and Penile prosthesis placement (N/A, 2023).     Allergies No Known Allergies   Medications   Current Outpatient Medications:     diclofenac sodium (VOLTAREN) 1 % GEL, Apply 2 g topically 4 times daily, Disp: 350 g, Rfl: 0    etodolac (LODINE) 300 MG capsule, Take 1 capsule by mouth in the morning and 1 capsule at noon and 1 capsule in the evening., Disp: 30 capsule, Rfl: 0    tamsulosin (FLOMAX) 0.4 MG capsule, Take 1 capsule by mouth in the morning and at bedtime, Disp: 90 capsule, Rfl: 3    tamsulosin (FLOMAX) 0.4 MG capsule, Take 1 capsule by mouth daily, Disp: 30 capsule, Rfl: 0    lidocaine (LIDODERM) 5 %, Place 1 patch onto the skin daily 12 hours on, 12 hours off., Disp: 30 patch, Rfl: 0    ibuprofen (ADVIL;MOTRIN) 800 MG tablet, Take 1 tablet by mouth every 8 hours as needed for Pain, Disp: 30  Continue Regimen: Azelaic acid 15% gel to face in the morning.\\nAklief cream to face at night as tolerated Otc Regimen: Omega 3 fish oil 1000mg daily Detail Level: Zone Otc Regimen: CeraVe itch relief lotion Initiate Treatment: Gentle skin care, stressing importance of moisturizing skin. \\nMometasone ointment to affected areas on the right arm twice a day for up to 2-3 weeks Plan: Suggested wearing work gloves and possibly wearing long sleeves, possibly 100% cotton fabric as arm protection Detail Level: Simple

## 2025-04-26 ENCOUNTER — APPOINTMENT (OUTPATIENT)
Dept: GENERAL RADIOLOGY | Age: 65
End: 2025-04-26
Payer: OTHER GOVERNMENT

## 2025-04-26 ENCOUNTER — HOSPITAL ENCOUNTER (EMERGENCY)
Age: 65
Discharge: HOME | End: 2025-04-26
Attending: STUDENT IN AN ORGANIZED HEALTH CARE EDUCATION/TRAINING PROGRAM
Payer: OTHER GOVERNMENT

## 2025-04-26 VITALS
OXYGEN SATURATION: 97 % | HEART RATE: 101 BPM | SYSTOLIC BLOOD PRESSURE: 135 MMHG | DIASTOLIC BLOOD PRESSURE: 95 MMHG | BODY MASS INDEX: 23.59 KG/M2 | HEIGHT: 73 IN | RESPIRATION RATE: 17 BRPM | TEMPERATURE: 97.8 F | WEIGHT: 178 LBS

## 2025-04-26 DIAGNOSIS — M25.512 ACUTE PAIN OF LEFT SHOULDER: Primary | ICD-10-CM

## 2025-04-26 LAB
EKG ATRIAL RATE: 93 BPM
EKG P AXIS: 74 DEGREES
EKG P-R INTERVAL: 154 MS
EKG Q-T INTERVAL: 346 MS
EKG QRS DURATION: 84 MS
EKG QTC CALCULATION (BAZETT): 430 MS
EKG R AXIS: 64 DEGREES
EKG T AXIS: 84 DEGREES
EKG VENTRICULAR RATE: 93 BPM

## 2025-04-26 PROCEDURE — 93005 ELECTROCARDIOGRAM TRACING: CPT

## 2025-04-26 PROCEDURE — 99284 EMERGENCY DEPT VISIT MOD MDM: CPT

## 2025-04-26 PROCEDURE — 6370000000 HC RX 637 (ALT 250 FOR IP)

## 2025-04-26 PROCEDURE — 73030 X-RAY EXAM OF SHOULDER: CPT

## 2025-04-26 RX ORDER — HYDROCODONE BITARTRATE AND ACETAMINOPHEN 5; 325 MG/1; MG/1
1 TABLET ORAL EVERY 6 HOURS PRN
Qty: 8 TABLET | Refills: 0 | Status: SHIPPED | OUTPATIENT
Start: 2025-04-26 | End: 2025-04-26

## 2025-04-26 RX ORDER — ENOXAPARIN SODIUM 100 MG/ML
1 INJECTION SUBCUTANEOUS ONCE
Status: DISCONTINUED | OUTPATIENT
Start: 2025-04-26 | End: 2025-04-26

## 2025-04-26 RX ORDER — HYDROCODONE BITARTRATE AND ACETAMINOPHEN 5; 325 MG/1; MG/1
1 TABLET ORAL EVERY 6 HOURS PRN
Qty: 8 TABLET | Refills: 0 | Status: SHIPPED | OUTPATIENT
Start: 2025-04-26 | End: 2025-04-28

## 2025-04-26 RX ORDER — HYDROCODONE BITARTRATE AND ACETAMINOPHEN 5; 325 MG/1; MG/1
1 TABLET ORAL ONCE
Status: COMPLETED | OUTPATIENT
Start: 2025-04-26 | End: 2025-04-26

## 2025-04-26 RX ADMIN — HYDROCODONE BITARTRATE AND ACETAMINOPHEN 1 TABLET: 5; 325 TABLET ORAL at 04:46

## 2025-04-26 ASSESSMENT — PAIN SCALES - GENERAL: PAINLEVEL_OUTOF10: 9

## 2025-04-26 ASSESSMENT — PAIN - FUNCTIONAL ASSESSMENT: PAIN_FUNCTIONAL_ASSESSMENT: 0-10

## 2025-04-26 ASSESSMENT — PAIN DESCRIPTION - LOCATION: LOCATION: SHOULDER

## 2025-04-26 ASSESSMENT — PAIN DESCRIPTION - ORIENTATION: ORIENTATION: LEFT

## 2025-04-26 NOTE — ED TRIAGE NOTES
Pt presents to ed from home with c/c of left shoulder pain and back pain. Pt states he has had problems with his shoulder and had rotator cuff surgery. Pt states he has been doing strenuous activity. Rates pain a 9/10. Took tylenol for pain. Patient resting in bed. Respirations easy and unlabored. No distress noted. Call light within reach.

## 2025-04-26 NOTE — DISCHARGE INSTRUCTIONS
You were seen in the ED for left shoulder pain.  Symptoms suspected due to acute exacerbation of chronic rotator cuff tear.  Can take Norco as needed only for severe pain.  Otherwise alternate Tylenol and ibuprofen.  Continue to take all other medications as prescribed and indicated.  Follow-up with FM clinic and [place] [facility] as soon as possible for further management.  Also follow-up with PCP as soon as possible for further evaluation and management.  Return to the ED for any new or worsening symptoms, or any additional concerns.

## 2025-04-26 NOTE — ED PROVIDER NOTES
[facility] EMERGENCY DEPARTMENT  EMERGENCY DEPARTMENT ENCOUNTER          Pt Name: Higinio Woodward  MRN: [id number]  Birthdate 1960  Date of evaluation: 4/26/2025  Physician: Hernán Carmona MD  Supervising Attending Physician: Vlad Rdz DO       CHIEF COMPLAINT     Acute on chronic left rotator cuff pain      HISTORY OF PRESENT ILLNESS    HPI  Higinio Woodward is a 64 y.o. male with chronic rotator cuff tear who presents to the emergency department from home for evaluation of left shoulder pain.  States that he was helping out with garbage at a local soup kitchen last Friday, attempted to lift garbage over his head, and felt severe pain in his left rotator cuff.  Has a history of rotator cuff tear in which he follows with [facility] for management and pain control.  Has taken Norco in the past with adequate pain relief.  States that he has been putting it off since Friday as he has been able to deal with the pain and Tylenol has provided moderate relief, however he started to have severe pain in his shoulder tonight and it woke him from sleep therefore came to the ED for further evaluation.  Denies any recent fever or chills.  Denies any recent neck pain or back pain.  Denies any CP, SOB, abdominal pain, nausea, vomiting, constipation, diarrhea, dysuria, hematuria, hematochezia, numbness/tingling/weakness in extremities.    The patient has no other acute complaints at this time.      PAST MEDICAL AND SURGICAL HISTORY     Past Medical History:   Diagnosis Date    Arthritis     Bipolar 1 disorder (HCC)     COPD (chronic obstructive pulmonary disease) (HCC)     Hypertension     Lumbar radiculopathy      Past Surgical History:   Procedure Laterality Date    COLONOSCOPY Left 6/21/2019    COLONOSCOPY DIAGNOSTIC performed by Cameron Jacome MD at [facility]    FOOT SURGERY Right     HIP SURGERY Right 3/15/2021    right hip steroid injection no sedation performed by  PENILE PROSTHESIS INSERTION performed by Luis Fernando Olson Jr., MD at [facility] OR    TONSILLECTOMY      UPPER GASTROINTESTINAL ENDOSCOPY Left 6/21/2019    EGD BIOPSY performed by Cameron Jacome MD at [facility]         MEDICATIONS   No current facility-administered medications for this encounter.    Current Outpatient Medications:     diclofenac sodium (VOLTAREN) 1 % GEL, Apply 2 g topically 4 times daily, Disp: 350 g, Rfl: 0    etodolac (LODINE) 300 MG capsule, Take 1 capsule by mouth in the morning and 1 capsule at noon and 1 capsule in the evening., Disp: 30 capsule, Rfl: 0    tamsulosin (FLOMAX) 0.4 MG capsule, Take 1 capsule by mouth in the morning and at bedtime, Disp: 90 capsule, Rfl: 3    tamsulosin (FLOMAX) 0.4 MG capsule, Take 1 capsule by mouth daily, Disp: 30 capsule, Rfl: 0    lidocaine (LIDODERM) 5 %, Place 1 patch onto the skin daily 12 hours on, 12 hours off., Disp: 30 patch, Rfl: 0    lactase (LACTAID) 3000 units tablet, Take 1 tablet by mouth 3 times daily (with meals), Disp: 30 tablet, Rfl: 0    Calcium Polycarbophil (FIBER) 625 MG TABS, Take 1 tablet by mouth daily, Disp: 14 tablet, Rfl: 0    FLUoxetine (PROZAC) 20 MG capsule, Take 1 capsule by mouth daily, Disp: , Rfl:     metoprolol (TOPROL-XL) 50 MG XL tablet, Take 1 tablet by mouth daily, Disp: , Rfl:     ibuprofen (ADVIL;MOTRIN) 800 MG tablet, Take 1 tablet by mouth every 8 hours as needed for Pain, Disp: 30 tablet, Rfl: 0    gabapentin (NEURONTIN) 300 MG capsule, take 1 capsule by mouth twice a day, Disp: 60 capsule, Rfl: 0    Previous Medications    CALCIUM POLYCARBOPHIL (FIBER) 625 MG TABS    Take 1 tablet by mouth daily    DICLOFENAC SODIUM (VOLTAREN) 1 % GEL    Apply 2 g topically 4 times daily    ETODOLAC (LODINE) 300 MG CAPSULE    Take 1 capsule by mouth in the morning and 1 capsule at noon and 1 capsule in the evening.    FLUOXETINE (PROZAC) 20 MG CAPSULE    Take 1 capsule by mouth daily    GABAPENTIN (NEURONTIN) 300 MG CAPSULE

## 2025-06-24 ENCOUNTER — HOSPITAL ENCOUNTER (OUTPATIENT)
Age: 65
Discharge: HOME | End: 2025-06-24
Payer: OTHER GOVERNMENT

## 2025-06-24 ENCOUNTER — OFFICE VISIT (OUTPATIENT)
Dept: UROLOGY | Age: 65
End: 2025-06-24
Payer: OTHER GOVERNMENT

## 2025-06-24 ENCOUNTER — TRANSCRIBE ORDERS (OUTPATIENT)
Dept: ADMINISTRATIVE | Age: 65
End: 2025-06-24

## 2025-06-24 VITALS — HEIGHT: 73 IN | WEIGHT: 178 LBS | RESPIRATION RATE: 20 BRPM | BODY MASS INDEX: 23.59 KG/M2

## 2025-06-24 DIAGNOSIS — N13.8 BPH WITH OBSTRUCTION/LOWER URINARY TRACT SYMPTOMS: Primary | ICD-10-CM

## 2025-06-24 DIAGNOSIS — N40.1 BPH WITH OBSTRUCTION/LOWER URINARY TRACT SYMPTOMS: ICD-10-CM

## 2025-06-24 DIAGNOSIS — N52.01 ERECTILE DYSFUNCTION DUE TO ARTERIAL INSUFFICIENCY: ICD-10-CM

## 2025-06-24 DIAGNOSIS — R93.89 ABNORMAL FINDINGS ON DIAGNOSTIC IMAGING OF OTHER SPECIFIED BODY STRUCTURES: Primary | ICD-10-CM

## 2025-06-24 DIAGNOSIS — Z12.5 PROSTATE CANCER SCREENING: ICD-10-CM

## 2025-06-24 DIAGNOSIS — N40.1 BPH WITH OBSTRUCTION/LOWER URINARY TRACT SYMPTOMS: Primary | ICD-10-CM

## 2025-06-24 DIAGNOSIS — N13.8 BPH WITH OBSTRUCTION/LOWER URINARY TRACT SYMPTOMS: ICD-10-CM

## 2025-06-24 LAB — PSA SERPL-MCNC: 4.94 NG/ML (ref 0–1)

## 2025-06-24 PROCEDURE — 84153 ASSAY OF PSA TOTAL: CPT

## 2025-06-24 PROCEDURE — 36415 COLL VENOUS BLD VENIPUNCTURE: CPT

## 2025-06-24 PROCEDURE — 99214 OFFICE O/P EST MOD 30 MIN: CPT

## 2025-06-24 RX ORDER — TAMSULOSIN HYDROCHLORIDE 0.4 MG/1
0.8 CAPSULE ORAL DAILY
Qty: 180 CAPSULE | Refills: 3 | Status: SHIPPED | OUTPATIENT
Start: 2025-06-24

## 2025-06-24 NOTE — PROGRESS NOTES
[facility] [place] [facility]  [facility]  [address]  [address]  [facility] [address]  Dept: [phone]  Loc: [phone]  Visit Date: 6/24/2025      HPI  Higinio Woodward is a 64 y.o. male that presents to the urology clinic for ED and BPH follow-up.    Erectile Dysfunction  IPP in February 2023. Working well. Cycling easily. Sore at times, but mild. Overall patient is pleased with his device at this time.    BPH  Managed on Tamsulosin 0.4 mg BID. RE and decreased libido with Flomax. Taking infrequently. Not at goal. Weak urinary stream, straining, and frequency.    Most Recent PSA: 2.60  (08/20/24)      Last BUN and Creatinine:  Lab Results   Component Value Date    BUN 9 05/28/2024     Lab Results   Component Value Date    CREATININE 0.9 05/28/2024           PAST MEDICAL, FAMILY AND SOCIAL HISTORY UPDATE:  Past Medical History:   Diagnosis Date    Arthritis     Bipolar 1 disorder (HCC)     COPD (chronic obstructive pulmonary disease) (HCC)     Hypertension     Lumbar radiculopathy      Past Surgical History:   Procedure Laterality Date    COLONOSCOPY Left 6/21/2019    COLONOSCOPY DIAGNOSTIC performed by Cameron Jacome MD at [facility]    FOOT SURGERY Right     HIP SURGERY Right 3/15/2021    right hip steroid injection no sedation performed by Jose Luis Holley MD at [facility] [place] OR    PAIN MANAGEMENT PROCEDURE Right 2/8/2021    RIGHT TFLESI @ L4 and L5. performed by Jose Luis Holley MD at [facility] [place] OR    PAIN MANAGEMENT PROCEDURE Right 4/20/2021    LESI @L4#1 right performed by Jose Luis Holley MD at [facility] OR    PAIN MANAGEMENT PROCEDURE Right 6/22/2021    right LESI # 2 @ L3 performed by Jose Luis Holley MD at [facility] OR    PENILE PROSTHESIS PLACEMENT N/A 2/20/2023    PENILE PROSTHESIS INSERTION performed by Luis Fernando Olson Jr., MD at [facility] OR    TONSILLECTOMY      UPPER GASTROINTESTINAL ENDOSCOPY Left

## 2025-06-25 ENCOUNTER — RESULTS FOLLOW-UP (OUTPATIENT)
Dept: UROLOGY | Age: 65
End: 2025-06-25

## 2025-06-25 DIAGNOSIS — R97.20 ELEVATED PSA: Primary | ICD-10-CM

## 2025-07-29 ENCOUNTER — TELEPHONE (OUTPATIENT)
Dept: UROLOGY | Age: 65
End: 2025-07-29

## 2025-07-29 ENCOUNTER — OFFICE VISIT (OUTPATIENT)
Dept: UROLOGY | Age: 65
End: 2025-07-29
Payer: OTHER GOVERNMENT

## 2025-07-29 VITALS — WEIGHT: 177 LBS | HEIGHT: 73 IN | BODY MASS INDEX: 23.46 KG/M2 | RESPIRATION RATE: 18 BRPM

## 2025-07-29 DIAGNOSIS — N13.8 BPH WITH OBSTRUCTION/LOWER URINARY TRACT SYMPTOMS: ICD-10-CM

## 2025-07-29 DIAGNOSIS — N40.1 BPH WITH OBSTRUCTION/LOWER URINARY TRACT SYMPTOMS: ICD-10-CM

## 2025-07-29 DIAGNOSIS — R97.20 ELEVATED PSA: Primary | ICD-10-CM

## 2025-07-29 DIAGNOSIS — N52.01 ERECTILE DYSFUNCTION DUE TO ARTERIAL INSUFFICIENCY: ICD-10-CM

## 2025-07-29 PROCEDURE — 99214 OFFICE O/P EST MOD 30 MIN: CPT

## 2025-07-29 NOTE — TELEPHONE ENCOUNTER
Patient scheduled for MRI PROSTATE W WO  at [facility] MR on 8/29/2025.  Arrival of 1130 for a 1200 scan time.  Order mailed with instructions to the patient

## 2025-07-29 NOTE — PATIENT INSTRUCTIONS
PSA Trend  5.29   (07/15/25)  4.94   (06/24/25)  2.60   (08/20/24)  1.75   (01/13/23)  1.24   (10/21/21)  1.20   (02/16/21)    Descovy.

## 2025-07-29 NOTE — PROGRESS NOTES
[facility] [place] [facility]  [facility] UROLOGY  [address]  [address]  [facility] [address]  Dept: [phone]  Loc: [phone]  Visit Date: 7/29/2025      HPI  Higinio Woodward is a 64 y.o. male that presents to the urology clinic for elevated PSA follow-up.    Elevated PSA  Acute rise in PSA: 2.60 (08/20/24) > 4.94 (06/24/25) > 5.29 (07/15/25). Recheck proved unfruitful as PSA continued to rise.    + Family history of prostate cancer, uncles.    Erectile Dysfunction  IPP in February 2023. Working well. Cycling easily. Sore at times, but mild. Overall patient is pleased with his device at this time.    BPH  Managed on Tamsulosin 0.4 mg BID. RE and decreased libido with Flomax. Taking infrequently. Not at goal. Weak urinary stream, straining, and frequency.    PSA Trend  5.29   (07/15/25)  4.94   (06/24/25)  2.60   (08/20/24)  1.75   (01/13/23)  1.24   (10/21/21)  1.20   (02/16/21)      Last BUN and Creatinine:  Lab Results   Component Value Date    BUN 9 05/28/2024     Lab Results   Component Value Date    CREATININE 0.9 05/28/2024           PAST MEDICAL, FAMILY AND SOCIAL HISTORY UPDATE:  Past Medical History:   Diagnosis Date    Arthritis     Bipolar 1 disorder (HCC)     COPD (chronic obstructive pulmonary disease) (HCC)     Hypertension     Lumbar radiculopathy      Past Surgical History:   Procedure Laterality Date    COLONOSCOPY Left 6/21/2019    COLONOSCOPY DIAGNOSTIC performed by Cameron Jacome MD at [facility]    FOOT SURGERY Right     HIP SURGERY Right 3/15/2021    right hip steroid injection no sedation performed by Jose Luis Holley MD at [facility] OR    PAIN MANAGEMENT PROCEDURE Right 2/8/2021    RIGHT TFLESI @ L4 and L5. performed by Jose Luis Holley MD at [facility] [place] OR    PAIN MANAGEMENT PROCEDURE Right 4/20/2021    LESI @L4#1 right performed by Jose Luis Holley MD at [facility] [place] OR    PAIN MANAGEMENT PROCEDURE Right 6/22/2021

## 2025-08-01 ENCOUNTER — HOSPITAL ENCOUNTER (OUTPATIENT)
Dept: CT IMAGING | Age: 65
Discharge: HOME | End: 2025-08-01
Attending: INTERNAL MEDICINE
Payer: OTHER GOVERNMENT

## 2025-08-01 DIAGNOSIS — R93.89 ABNORMAL FINDINGS ON DIAGNOSTIC IMAGING OF OTHER SPECIFIED BODY STRUCTURES: ICD-10-CM

## 2025-08-01 LAB — POC CREATININE WHOLE BLOOD: 1.1 MG/DL (ref 0.5–1.2)

## 2025-08-01 PROCEDURE — 71260 CT THORAX DX C+: CPT

## 2025-08-01 PROCEDURE — 6360000004 HC RX CONTRAST MEDICATION: Performed by: INTERNAL MEDICINE

## 2025-08-01 PROCEDURE — 82565 ASSAY OF CREATININE: CPT

## 2025-08-01 RX ORDER — IOPAMIDOL 755 MG/ML
80 INJECTION, SOLUTION INTRAVASCULAR
Status: COMPLETED | OUTPATIENT
Start: 2025-08-01 | End: 2025-08-01

## 2025-08-01 RX ADMIN — IOPAMIDOL 80 ML: 755 INJECTION, SOLUTION INTRAVENOUS at 14:23

## (undated) DEVICE — SYRINGE MED 3ML CLR PLAS STD N CTRL LUERLOCK TIP DISP

## (undated) DEVICE — 6 ML SYRINGE LUER-LOCK TIP: Brand: MONOJECT

## (undated) DEVICE — SYRINGE MED 5ML STD CLR PLAS LUERLOCK TIP N CTRL DISP

## (undated) DEVICE — CHLORAPREP 26ML CLEAR

## (undated) DEVICE — BANDAGE ADH W1XL3IN NAT FAB WVN FLX DURABLE N ADH PD SEAL

## (undated) DEVICE — NEEDLE SYR 18GA L1.5IN RED PLAS HUB S STL BLNT FILL W/O

## (undated) DEVICE — TUBING IV STOPCOCK 48 CM 3 W

## (undated) DEVICE — GLOVE ORANGE PI 7 1/2   MSG9075

## (undated) DEVICE — SPONGE LAP W18XL18IN WHT COT 4 PLY FLD STRUNG RADPQ DISP ST

## (undated) DEVICE — HYPODERMIC SAFETY NEEDLE: Brand: MAGELLAN

## (undated) DEVICE — 20 ML SYRINGE LUER-LOCK TIP: Brand: MONOJECT

## (undated) DEVICE — SOLUTION IV 1000ML 0.9% SOD CHL PH 5 INJ USP VIAFLX PLAS

## (undated) DEVICE — SYRINGE MED 10ML LUERLOCK TIP W/O SFTY DISP

## (undated) DEVICE — TRAY NRV BLK PARACERVICAL PUDEN W/ 10ML CTRL SYR

## (undated) DEVICE — CONMED SCOPE SAVER BITE BLOCK, 20X27 MM: Brand: SCOPE SAVER

## (undated) DEVICE — LINER SUCT CANSTR 1500CC SEMI RIG W/ POR HYDROPHOBIC SHUT

## (undated) DEVICE — SYRINGE, LUER LOCK, 60ML: Brand: MEDLINE

## (undated) DEVICE — SET ADMIN 25ML L117IN PMP MOD CK VLV RLER CLMP 2 SMRTSITE

## (undated) DEVICE — SUTURE VCRL + SZ 3-0 L27IN ABSRB UD L26MM SH 1/2 CIR VCP416H

## (undated) DEVICE — 3 ML SYRINGE LUER-LOCK TIP: Brand: MONOJECT

## (undated) DEVICE — GAUZE,SPONGE,4"X4",12PLY,STERILE,LF,2'S: Brand: MEDLINE

## (undated) DEVICE — PLUG,CATHETER,DRAINAGE PROTECTOR,TUBE: Brand: MEDLINE

## (undated) DEVICE — IV START KIT: Brand: MEDLINE INDUSTRIES, INC.

## (undated) DEVICE — ADHESIVE SKIN CLSR 0.7ML TOP DERMBND ADV

## (undated) DEVICE — TOWEL,OR,DSP,ST,BLUE,STD,4/PK,20PK/CS: Brand: MEDLINE

## (undated) DEVICE — LABEL MED DRUG CUST

## (undated) DEVICE — NEEDLE SPNL 22GA L3.5IN BLK WHTACR PNCL PNT HI FLO DISP

## (undated) DEVICE — SOLUTION IV 1000ML 0.45% SOD CHL PH 5 INJ USP VIAFLX PLAS

## (undated) DEVICE — GAUZE SPONGES,USP TYPE VII GAUZE, 12 PLY: Brand: CURITY

## (undated) DEVICE — NEEDLE SPNL 22GA L3.5IN BLK HUB S STL REG WALL FIT STYL W/

## (undated) DEVICE — FORCEPS BX L240CM JAW DIA3.2MM L CAP W/ NDL MIC MESH TOOTH

## (undated) DEVICE — CATHETER ETER IV 22GA L1IN POLYUR STR RADPQ INTROCAN SFTY

## (undated) DEVICE — GARMENT,MEDLINE,DVT,INT,CALF,MED, GEN2: Brand: MEDLINE

## (undated) DEVICE — DRAINBAG,ANTI-REFLUX TOWER,L/F,2000ML,LL: Brand: MEDLINE

## (undated) DEVICE — SET LNR RED GRN W/ BASE CLEANASCOPE

## (undated) DEVICE — CATHETER URETH 18FR BLLN 5CC SIL ALLY W/ SIL HYDRGEL 2 W F

## (undated) DEVICE — ENDO KIT: Brand: MEDLINE INDUSTRIES, INC.

## (undated) DEVICE — SUTURE PDS + SZ 2 0 L27IN ABSRB VLT L26MM SH 1 2 CIR PDP317H

## (undated) DEVICE — SOLUTION IV IRRIG POUR BRL 0.9% SODIUM CHL 2F7124

## (undated) DEVICE — PACK SURG ABD SVMMC

## (undated) DEVICE — SUTURE VCRL + SZ 2-0 L27IN ABSRB WHT SH 1/2 CIR TAPERCUT VCP417H

## (undated) DEVICE — CONNECTOR TBNG AUX H2O JET DISP FOR OLY 160/180 SER

## (undated) DEVICE — Z DISCONTINUED USE 2272117 DRAPE SURG 3 QTR N INVASIVE 2 LAYR DISP

## (undated) DEVICE — SHEET,DRAPE,3/4,53X77,STERILE: Brand: MEDLINE